# Patient Record
Sex: MALE | Race: WHITE | NOT HISPANIC OR LATINO | Employment: OTHER | URBAN - METROPOLITAN AREA
[De-identification: names, ages, dates, MRNs, and addresses within clinical notes are randomized per-mention and may not be internally consistent; named-entity substitution may affect disease eponyms.]

---

## 2017-01-19 ENCOUNTER — ALLSCRIPTS OFFICE VISIT (OUTPATIENT)
Dept: OTHER | Facility: OTHER | Age: 67
End: 2017-01-19

## 2017-02-07 ENCOUNTER — GENERIC CONVERSION - ENCOUNTER (OUTPATIENT)
Dept: OTHER | Facility: OTHER | Age: 67
End: 2017-02-07

## 2017-04-04 ENCOUNTER — ALLSCRIPTS OFFICE VISIT (OUTPATIENT)
Dept: OTHER | Facility: OTHER | Age: 67
End: 2017-04-04

## 2017-04-04 ENCOUNTER — GENERIC CONVERSION - ENCOUNTER (OUTPATIENT)
Dept: OTHER | Facility: OTHER | Age: 67
End: 2017-04-04

## 2017-04-20 ENCOUNTER — ALLSCRIPTS OFFICE VISIT (OUTPATIENT)
Dept: OTHER | Facility: OTHER | Age: 67
End: 2017-04-20

## 2017-04-20 DIAGNOSIS — R07.9 CHEST PAIN: ICD-10-CM

## 2017-04-20 DIAGNOSIS — R09.89 OTHER SPECIFIED SYMPTOMS AND SIGNS INVOLVING THE CIRCULATORY AND RESPIRATORY SYSTEMS: ICD-10-CM

## 2017-05-02 ENCOUNTER — HOSPITAL ENCOUNTER (OUTPATIENT)
Dept: NON INVASIVE DIAGNOSTICS | Facility: CLINIC | Age: 67
Discharge: HOME | End: 2017-05-02
Payer: MEDICARE

## 2017-05-02 DIAGNOSIS — R07.9 CHEST PAIN: ICD-10-CM

## 2017-05-02 DIAGNOSIS — R09.89 OTHER SPECIFIED SYMPTOMS AND SIGNS INVOLVING THE CIRCULATORY AND RESPIRATORY SYSTEMS: ICD-10-CM

## 2017-05-02 LAB
ARRHY DURING EX: NORMAL
CHEST PAIN STATEMENT: NORMAL
MAX DIASTOLIC BP: 80 MMHG
MAX HEART RATE: 96 BPM
MAX PREDICTED HEART RATE: 152 BPM
MAX. SYSTOLIC BP: 148 MMHG
PROTOCOL NAME: NORMAL
REASON FOR TERMINATION: NORMAL
TARGET HR FORMULA: NORMAL
TIME IN EXERCISE PHASE: 180 S

## 2017-05-02 PROCEDURE — 93017 CV STRESS TEST TRACING ONLY: CPT

## 2017-05-02 PROCEDURE — 78452 HT MUSCLE IMAGE SPECT MULT: CPT

## 2017-05-02 PROCEDURE — A9502 TC99M TETROFOSMIN: HCPCS

## 2017-05-04 ENCOUNTER — GENERIC CONVERSION - ENCOUNTER (OUTPATIENT)
Dept: OTHER | Facility: OTHER | Age: 67
End: 2017-05-04

## 2017-05-10 ENCOUNTER — HOSPITAL ENCOUNTER (OUTPATIENT)
Dept: NON INVASIVE DIAGNOSTICS | Facility: CLINIC | Age: 67
Discharge: HOME | End: 2017-05-10
Payer: MEDICARE

## 2017-05-10 DIAGNOSIS — R09.89 OTHER SPECIFIED SYMPTOMS AND SIGNS INVOLVING THE CIRCULATORY AND RESPIRATORY SYSTEMS: ICD-10-CM

## 2017-05-10 PROCEDURE — 93880 EXTRACRANIAL BILAT STUDY: CPT

## 2017-05-10 PROCEDURE — 93306 TTE W/DOPPLER COMPLETE: CPT

## 2017-05-11 ENCOUNTER — GENERIC CONVERSION - ENCOUNTER (OUTPATIENT)
Dept: OTHER | Facility: OTHER | Age: 67
End: 2017-05-11

## 2017-06-02 ENCOUNTER — HOSPITAL ENCOUNTER (EMERGENCY)
Facility: HOSPITAL | Age: 67
Discharge: HOME | End: 2017-06-02
Attending: EMERGENCY MEDICINE
Payer: MEDICARE

## 2017-06-02 VITALS
BODY MASS INDEX: 35.21 KG/M2 | SYSTOLIC BLOOD PRESSURE: 135 MMHG | OXYGEN SATURATION: 93 % | DIASTOLIC BLOOD PRESSURE: 63 MMHG | HEIGHT: 72 IN | RESPIRATION RATE: 18 BRPM | HEART RATE: 74 BPM | TEMPERATURE: 98.3 F | WEIGHT: 260 LBS

## 2017-06-02 DIAGNOSIS — T14.8XXA PUNCTURE WOUND: Primary | ICD-10-CM

## 2017-06-02 PROCEDURE — 99283 EMERGENCY DEPT VISIT LOW MDM: CPT

## 2017-06-02 RX ORDER — CEPHALEXIN 500 MG/1
500 CAPSULE ORAL ONCE
Status: COMPLETED | OUTPATIENT
Start: 2017-06-02 | End: 2017-06-02

## 2017-06-02 RX ORDER — CEPHALEXIN 250 MG/1
250 CAPSULE ORAL 4 TIMES DAILY
Qty: 20 CAPSULE | Refills: 0 | Status: SHIPPED | OUTPATIENT
Start: 2017-06-02 | End: 2017-06-07

## 2017-06-02 RX ADMIN — CEPHALEXIN 500 MG: 500 CAPSULE ORAL at 12:57

## 2017-10-04 ENCOUNTER — ALLSCRIPTS OFFICE VISIT (OUTPATIENT)
Dept: OTHER | Facility: OTHER | Age: 67
End: 2017-10-04

## 2017-10-05 NOTE — PROGRESS NOTES
Assessment  1  Hyperlipidemia, mixed (272 2) (E78 2)   2  Essential hypertension (401 9) (I10)   3  Seborrheic keratosis (702 19) (L82 1)    Plan  Encounter for prostate cancer screening, Hyperlipidemia, mixed    · (1) COMPREHENSIVE METABOLIC PANEL; Status:Active; Requested for:22Mar2018;    · (1) LIPID PANEL, FASTING; Status:Active; Requested for:22Mar2018;    · (1) PSA (SCREEN) (Dx V76 44 Screen for Prostate Cancer); Status:Active; Requested  for:22Mar2018;   Essential hypertension, Hyperlipidemia, mixed    · Follow-up visit in 6 months Evaluation and Treatment  Follow-up  Status: Hold For -  Scheduling  Requested for: [id number]    Discussion/Summary    No treatment for the skin lesion, let us know if it enlarges, changes color  The patient was counseled regarding diagnostic results,-instructions for management,-prognosis  Possible side effects of new medications were reviewed with the patient/guardian today  The treatment plan was reviewed with the patient/guardian  The patient/guardian understands and agrees with the treatment plan      Chief Complaint  Pt  in office today for a 6 month f/u and would like to discuss a spot on his chest  Pt  would also like a flu shot  Advance Directives  Advance Directive [facility]:   NO - Patient does not have an advance health care directive  History of Present Illness  The patient is being seen for an initial evaluation of an existing diagnosis of seborrheic keratosis  The patient presents with complaints of gradual onset of constant episodes of left trunk skin lesion, described as crusted (present for about 20 years, did have it removed once, but has returned)  The patient is currently experiencing symptoms  The patient states his hyperlipidemia has been under good control since the last visit  Comorbid Illnesses: hypertension  He has no significant interval events  Symptoms: The patient is currently asymptomatic     Medications: the patient is adherent with his medication regimen -He denies medication side effects  The patient presents for follow-up of essential hypertension  The patient states he has been doing well with his blood pressure control since the last visit  He has no significant interval events  Symptoms: The patient is currently asymptomatic  Medications: the patient is adherent with his medication regimen -He denies medication side effects  Review of Systems    Constitutional: No fever or chills, feels well, no tiredness, no recent weight gain or weight loss  Eyes: No complaints of eye pain, no red eyes, no discharge from eyes, no itchy eyes  ENT: no complaints of earache, no hearing loss, no nosebleeds, no nasal discharge, no sore throat, no hoarseness  Cardiovascular: No complaints of slow heart rate, no fast heart rate, no chest pain, no palpitations, no leg claudication, no lower extremity  Respiratory: No complaints of shortness of breath, no wheezing, no cough, no SOB on exertion, no orthopnea or PND  Gastrointestinal: No complaints of abdominal pain, no constipation, no nausea or vomiting, no diarrhea or bloody stools  Genitourinary: No complaints of dysuria, no incontinence, no hesitancy, no nocturia, no genital lesion, no testicular pain  Musculoskeletal: No complaints of arthralgia, no myalgias, no joint swelling or stiffness, no limb pain or swelling  Integumentary: as noted in HPI  Neurological: No compliants of headache, no confusion, no convulsions, no numbness or tingling, no dizziness or fainting, no limb weakness, no difficulty walking  Psychiatric: Is not suicidal, no sleep disturbances, no anxiety or depression, no change in personality, no emotional problems  Endocrine: No complaints of proptosis, no hot flashes, no muscle weakness, no erectile dysfunction, no deepening of the voice, no feelings of weakness     Hematologic/Lymphatic: No complaints of swollen glands, no swollen glands in the neck, does not bleed easily, no easy bruising  Active Problems  1  Acute bronchitis (466 0) (J20 9)   2  Bruit of right carotid artery (785 9) (R09 89)   3  Chest pain, exertional (786 50) (R07 9)   4  Diarrhea, unspecified type (787 91) (R19 7)   5  Encounter for prostate cancer screening (V76 44) (Z12 5)   6  Encounter for screening colonoscopy (V76 51) (Z12 11)   7  Exertional dyspnea (786 09) (R06 09)   8  Hyperlipidemia, mixed (272 2) (E78 2)   9  Need for diphtheria-tetanus-pertussis (Tdap) vaccine (V06 1) (Z23)   10  Need for influenza vaccination (V04 81) (Z23)   11  Other insomnia (780 52) (G47 09)   12  Screening for genitourinary condition (V81 6) (Z13 89)   13  Sinusitis, bacterial (473 9,041 9) (J32 9,B96 89)   14  Skin lesion of back (709 9) (L98 9)    Past Medical History  1  History of Essential hypertension with goal blood pressure less than 130/85 (401 9)   (I10)   2  History of hyperlipidemia (V12 29) (Z86 39)   3  History of Screening for genitourinary condition (V81 6) (Z13 89)    The active problems and past medical history were reviewed and updated today  Surgical History  1  History of Cataract Surgery   2  History of Cholecystectomy   3  History of Knee Replacement   4  History of Total Hip Replacement    The surgical history was reviewed and updated today  Family History  Mother    1  Family history of Diabetes (250 00) (E11 9)   2  Denied: Family history of mental disorder   3  Denied: Family history of substance abuse  Father    4  Family history of CAD (coronary artery disease)   5  Denied: Family history of mental disorder   6  Denied: Family history of substance abuse   7  Family history of Ruptured abdominal aortic aneurysm (AAA)  Paternal Grandmother    6  Family history of CAD (coronary artery disease)  Paternal Grandfather    5  Family history of CAD (coronary artery disease)    The family history was reviewed and updated today         Social History   · Daily caffeine consumption   · Does not drink alcohol (V49 89) (Z78 9)   · Does not use illicit drugs (T64 26) (Z78 9)   · Former smoker (V15 82) (Z87 891)   ·    · Retired  The social history was reviewed and updated today  Current Meds   1  Quinapril HCl - 10 MG Oral Tablet; take 1 tablet every day; Therapy: 72LZX9490 to (XUGCUCPU:08JQP4892)  Requested for: 28Jun2017; Last   Rx:28Jun2017 Ordered   2  Simvastatin 40 MG Oral Tablet; Take 1 tablet daily; Therapy: (Recorded:20Apr2017) to Recorded   3  TraZODone HCl - 50 MG Oral Tablet; TAKE 1 TABLET AT BEDTIME Recorded    The medication list was reviewed and updated today  Allergies  1  No Known Drug Allergies    Vitals  Vital Signs    Recorded: 68IHJ7585 08:56AM   Heart Rate 61   Systolic 053   Diastolic 80   Height 5 ft 11 in   Weight 263 lb    BMI Calculated 36 68   BSA Calculated 2 37   O2 Saturation 97     Physical Exam    Constitutional   General appearance: No acute distress, well appearing and well nourished  Ears, Nose, Mouth, and Throat   Oropharynx: Normal with no erythema, edema, exudate or lesions  Pulmonary   Auscultation of lungs: Clear to auscultation, equal breath sounds bilaterally, no wheezes, no rales, no rhonci  Cardiovascular   Auscultation of heart: Normal rate and rhythm, normal S1 and S2, without murmurs  Examination of extremities for edema and/or varicosities: Normal     Abdomen   Abdomen: Non-tender, no masses  Musculoskeletal   Gait and station: Normal     Neurologic   Cranial nerves: Cranial nerves 2-12 intact  Psychiatric   Orientation to person, place and time: Normal     Mood and affect: Normal          Health Management  Encounter for screening colonoscopy   COLONOSCOPY; every 5 years; Last [id number]; Next Due: 11NBE2464;  Active    Signatures   Electronically signed by : Cassius Syed DO; Oct  4 2017  9:15AM EST                       (Author)

## 2017-12-01 ENCOUNTER — ALLSCRIPTS OFFICE VISIT (OUTPATIENT)
Dept: OTHER | Facility: OTHER | Age: 67
End: 2017-12-01

## 2017-12-05 NOTE — PROGRESS NOTES
Assessment    1  Abdominal pain, generalized (789 07) (R10 84)   2  Diarrhea, unspecified type (787 91) (R19 7)    Plan  Abdominal pain, generalized, Diarrhea, unspecified type    · Levsin 0 125 MG Oral Tablet; TAKE 1 TABLET 3 times daily PRN abdominal pain    Discussion/Summary    He is to call on Tuesday to report symptoms  The patient was counseled regarding diagnostic results,-- instructions for management,-- prognosis  Chief Complaint  Pt in office today c/o abdominal bloating and diarrhea  History of Present Illness  Abdominal Pain (Follow-Up): The patient is being seen for follow-up of abdominal pain  The patient reports worsening cramping and diarrhea  He has no comorbid illnesses  Medications include antidiarrheals  Medications:  the patient is adherent to his medication regimen, but-- he denies medication side effects  Review of Systems   Constitutional: no fever or chills, feels well, no tiredness, no recent weight loss or weight gain-- and-- no fever  ENT: no complaints of earache, no loss of hearing, no nosebleeds or nasal discharge, no sore throat or hoarseness  Cardiovascular: no complaints of slow or fast heart rate, no chest pain, no palpitations, no leg claudication or lower extremity edema  Respiratory: no complaints of shortness of breath, no wheezing or cough, no dyspnea on exertion, no orthopnea or PND  Gastrointestinal: as noted in HPI  Genitourinary: no complaints of dysuria or incontinence, no hesitancy, no nocturia, no genital lesion, no inadequacy of penile erection  Musculoskeletal: no complaints of arthralgia, no myalgia, no joint swelling or stiffness, no limb pain or swelling  Active Problems  1  Acute bronchitis (466 0) (J20 9)   2  Bruit of right carotid artery (785 9) (R09 89)   3  Chest pain, exertional (786 50) (R07 9)   4  Encounter for prostate cancer screening (V76 44) (Z12 5)   5  Encounter for screening colonoscopy (V76 51) (Z12 11)   6  Essential hypertension (401 9) (I10)   7  Exertional dyspnea (786 09) (R06 09)   8  Hyperlipidemia, mixed (272 2) (E78 2)   9  Need for diphtheria-tetanus-pertussis (Tdap) vaccine (V06 1) (Z23)   10  Need for influenza vaccination (V04 81) (Z23)   11  Other insomnia (780 52) (G47 09)   12  Screening for genitourinary condition (V81 6) (Z13 89)   13  Seborrheic keratosis (702 19) (L82 1)   14  Sinusitis, bacterial (473 9,041 9) (J32 9,B96 89)   15  Skin lesion of back (709 9) (L98 9)    Past Medical History  1  History of Essential hypertension with goal blood pressure less than 130/85 (401 9) (I10)   2  History of diarrhea (V12 79) (Z87 898)   3  History of diarrhea (V12 79) (Z87 898)   4  History of hyperlipidemia (V12 29) (Z86 39)   5  History of Screening for genitourinary condition (V81 6) (Z13 89)    Family History  Mother    1  Family history of Diabetes (250 00) (E11 9)   2  Denied: Family history of mental disorder   3  Denied: Family history of substance abuse  Father    4  Family history of CAD (coronary artery disease)   5  Denied: Family history of mental disorder   6  Denied: Family history of substance abuse   7  Family history of Ruptured abdominal aortic aneurysm (AAA)  Paternal Grandmother    6  Family history of CAD (coronary artery disease)  Paternal Grandfather    5  Family history of CAD (coronary artery disease)    Social History   · Daily caffeine consumption   · Does not drink alcohol (V49 89) (Z78 9)   · Does not use illicit drugs (H02 18) (Z78 9)   · Former smoker (C61 69) (Q80 047)   ·    · Retired    Surgical History    1  History of Cataract Surgery   2  History of Cholecystectomy   3  History of Knee Replacement   4  History of Total Hip Replacement    Current Meds   1  Quinapril HCl - 10 MG Oral Tablet; take 1 tablet every day; Therapy: 32MZZ5533 to (MAGOMLLI:56DBM0279)  Requested for: 28Jun2017; Last Rx:28Jun2017 Ordered   2  Simvastatin 40 MG Oral Tablet;  Take 1 tablet daily; Therapy: (Recorded:42Svm2715) to Recorded   3  TraZODone HCl - 50 MG Oral Tablet; TAKE 1 TABLET AT BEDTIME Recorded    Allergies  1  No Known Drug Allergies    Vitals   Recorded: 10TNM8223 11:25AM   Temperature 97 8 F   Heart Rate 73   Systolic 264   Diastolic 80   Height 5 ft 11 in   Weight 262 lb    BMI Calculated 36 54   BSA Calculated 2 37   O2 Saturation 97       Physical Exam   Constitutional  General appearance: No acute distress, well appearing and well nourished  Ears, Nose, Mouth, and Throat  Oropharynx: Normal with no erythema, edema, exudate or lesions  Pulmonary  Auscultation of lungs: Clear to auscultation, equal breath sounds bilaterally, no wheezes, no rales, no rhonci  Cardiovascular  Auscultation of heart: Normal rate and rhythm, normal S1 and S2, without murmurs  Examination of extremities for edema and/or varicosities: Normal    Abdomen  Abdomen: Non-tender, no masses  Musculoskeletal  Gait and station: Normal          Future Appointments    Date/Time Provider Specialty Site   04/04/2018 08:30 AM GRECIA Pedraza   [address]       Signatures   Electronically signed by : Myles Rhodes DO; Dec  1 2017 12:37PM EST                       (Author)

## 2017-12-26 ENCOUNTER — ALLSCRIPTS OFFICE VISIT (OUTPATIENT)
Dept: OTHER | Facility: OTHER | Age: 67
End: 2017-12-26

## 2018-01-09 ENCOUNTER — HOSPITAL ENCOUNTER (OUTPATIENT)
Facility: HOSPITAL | Age: 68
Setting detail: OUTPATIENT SURGERY
Discharge: HOME | End: 2018-01-09
Attending: INTERNAL MEDICINE | Admitting: INTERNAL MEDICINE
Payer: MEDICARE

## 2018-01-09 ENCOUNTER — ANESTHESIA EVENT (OUTPATIENT)
Dept: PERIOP | Facility: HOSPITAL | Age: 68
End: 2018-01-09
Payer: MEDICARE

## 2018-01-09 ENCOUNTER — ANESTHESIA (OUTPATIENT)
Dept: PERIOP | Facility: HOSPITAL | Age: 68
End: 2018-01-09
Payer: MEDICARE

## 2018-01-09 ENCOUNTER — GENERIC CONVERSION - ENCOUNTER (OUTPATIENT)
Dept: GASTROENTEROLOGY | Facility: CLINIC | Age: 68
End: 2018-01-09

## 2018-01-09 VITALS
WEIGHT: 261.8 LBS | RESPIRATION RATE: 18 BRPM | HEIGHT: 72 IN | BODY MASS INDEX: 35.46 KG/M2 | HEART RATE: 59 BPM | TEMPERATURE: 98.8 F | SYSTOLIC BLOOD PRESSURE: 107 MMHG | OXYGEN SATURATION: 95 % | DIASTOLIC BLOOD PRESSURE: 65 MMHG

## 2018-01-09 DIAGNOSIS — R10.32 ABDOMINAL PAIN, BILATERAL LOWER QUADRANT: ICD-10-CM

## 2018-01-09 DIAGNOSIS — R14.1 ABDOMINAL GAS PAIN: ICD-10-CM

## 2018-01-09 DIAGNOSIS — R10.84 GENERALIZED ABDOMINAL PAIN: ICD-10-CM

## 2018-01-09 DIAGNOSIS — R10.31 ABDOMINAL PAIN, BILATERAL LOWER QUADRANT: ICD-10-CM

## 2018-01-09 PROCEDURE — 88305 TISSUE EXAM BY PATHOLOGIST: CPT | Performed by: INTERNAL MEDICINE

## 2018-01-09 RX ORDER — SODIUM CHLORIDE, SODIUM LACTATE, POTASSIUM CHLORIDE, CALCIUM CHLORIDE 600; 310; 30; 20 MG/100ML; MG/100ML; MG/100ML; MG/100ML
125 INJECTION, SOLUTION INTRAVENOUS CONTINUOUS
Status: DISCONTINUED | OUTPATIENT
Start: 2018-01-09 | End: 2018-01-09 | Stop reason: HOSPADM

## 2018-01-09 RX ORDER — PROPOFOL 10 MG/ML
INJECTION, EMULSION INTRAVENOUS AS NEEDED
Status: DISCONTINUED | OUTPATIENT
Start: 2018-01-09 | End: 2018-01-09 | Stop reason: SURG

## 2018-01-09 RX ADMIN — PROPOFOL 20 MG: 10 INJECTION, EMULSION INTRAVENOUS at 11:25

## 2018-01-09 RX ADMIN — PROPOFOL 100 MG: 10 INJECTION, EMULSION INTRAVENOUS at 11:19

## 2018-01-09 RX ADMIN — PROPOFOL 100 MG: 10 INJECTION, EMULSION INTRAVENOUS at 11:21

## 2018-01-09 RX ADMIN — SODIUM CHLORIDE, SODIUM LACTATE, POTASSIUM CHLORIDE, AND CALCIUM CHLORIDE 125 ML/HR: .6; .31; .03; .02 INJECTION, SOLUTION INTRAVENOUS at 11:04

## 2018-01-09 NOTE — DISCHARGE INSTRUCTIONS
Colonoscopy   WHAT YOU NEED TO KNOW:   A colonoscopy is a procedure to examine the inside of your colon (intestine) with a scope  Polyps or tissue growths may have been removed during your colonoscopy  It is normal to feel bloated and to have some abdominal discomfort  You should be passing gas  If you have hemorrhoids or you had polyps removed, you may have a small amount of bleeding  DISCHARGE INSTRUCTIONS:   Seek care immediately if:   · You have a large amount of bright red blood in your bowel movements  · Your abdomen is hard and firm and you have severe pain  · You have sudden trouble breathing  Contact your healthcare provider if:   · You develop a rash or hives  · You have a fever within 24 hours of your procedure  · You have not had a bowel movement for 3 days after your procedure  · You have questions or concerns about your condition or care  Activity:   · Do not lift, strain, or run  for 3 days after your procedure  · Rest after your procedure  You have been given medicine to relax you  Do not  drive or make important decisions until the day after your procedure  Return to your normal activity as directed  · Relieve gas and discomfort from bloating  by lying on your right side with a heating pad on your abdomen  You may need to take short walks to help the gas move out  Eat small meals until bloating is relieved  If you had polyps removed: For 7 days after your procedure:  · Do not  take aspirin  · Do not  go on long car rides  Help prevent constipation:   · Eat a variety of healthy foods  Healthy foods include fruit, vegetables, whole-grain breads, low-fat dairy products, beans, lean meat, and fish  Ask if you need to be on a special diet  Your healthcare provider may recommend that you eat high-fiber foods such as cooked beans  Fiber helps you have regular bowel movements  · Drink liquids as directed    Adults should drink between 9 and 13 eight-ounce cups of liquid every day  Ask what amount is best for you  For most people, good liquids to drink are water, juice, and milk  · Exercise as directed  Talk to your healthcare provider about the best exercise plan for you  Exercise can help prevent constipation, decrease your blood pressure and improve your health  Follow up with your healthcare provider as directed:  Write down your questions so you remember to ask them during your visits  © 2017 [address] [employer] Information is for End User's use only and may not be sold, redistributed or otherwise used for commercial purposes  All illustrations and images included in CareNotes® are the copyrighted property of [employer]  or [employer]  The above information is an  only  It is not intended as medical advice for individual conditions or treatments  Talk to your doctor, nurse or pharmacist before following any medical regimen to see if it is safe and effective for you

## 2018-01-09 NOTE — OP NOTE
**** GI/ENDOSCOPY REPORT ****     PATIENT NAME: Sathya Jeter ------ VISIT ID:  Patient ID:   [id number] YOB: 1950     INTRODUCTION: Colonoscopy - A 75 male patient presents for an outpatient   Colonoscopy at [address] [place] [address]  PREVIOUS COLONOSCOPY: Patient's last colonoscopy was 4 years ago  INDICATIONS: Diarrhea  Pain centered in the right lower quadrant of the   abdomen  LLQ pain     CONSENT:  The benefits, risks, and alternatives to the procedure were   discussed and informed consent was obtained from the patient  PREPARATION: EKG, pulse, pulse oximetry and blood pressure were monitored   throughout the procedure  The patient was identified by myself both   verbally and by visual inspection of ID band  Airway Assessment   Classification: Airway class 2 - Visualization of the soft palate, fauces   and uvula  ASA Classification: Class 2 - Patient has mild to moderate   systemic disturbance that may or may not be related to the disorder   requiring surgery  MEDICATIONS: Anesthesia-check records     PROCEDURE:  The endoscope was passed with ease through the anus under   direct visualization and advanced to the terminal ileum, confirmed by   appendiceal orifice, cecal strap (crow's foot), and ileocecal valve  The   scope was withdrawn and the mucosa was carefully examined  The quality of   the preparation was excellent  Cecal Intubation Time: 2 minutes(s) Scope   Withdrawal Time: 7 minutes(s)     RECTAL EXAM: Normal rectal exam      FINDINGS:  There was evidence of moderately severe diverticulosis in the   sigmoid colon and descending colon  Otherwise, the colon appeared to be   normal  The terminal ileum appeared to be normal  A biopsy was taken from   the terminal ileum, ascending colon, and descending colon  Sent for IBD  Normal retroversion     COMPLICATIONS: There were no complications       IMPRESSIONS: Moderately severe diverticulosis found in the sigmoid colon and descending colon  Normal terminal ileum  Biopsy taken  RECOMMENDATIONS: Follow-up on the results of the biopsy specimens  Colonoscopy recommended in 5 years  ESTIMATED BLOOD LOSS: Insignificant  PATHOLOGY SPECIMENS: Random biopsy taken from the terminal ileum,   ascending colon, and descending colon  PROCEDURE CODES: Colonoscopy with biopsy     ICD-9 Codes: 787 91 Diarrhea 789 03 Abdominal pain, right lower quadrant   562 10 Diverticulosis of colon (without mention of hemorrhage)     ICD-10 Codes: R19 7 Diarrhea, unspecified R10 31 Right lower quadrant pain   K57 Diverticular disease of intestine     PERFORMED BY: GRECIA Canchola  [facility]  on 01/09/2018  Version 1, electronically signed by GRECIA Verde , D O  on   01/09/2018 at 11:33

## 2018-01-09 NOTE — ANESTHESIA PREPROCEDURE EVALUATION
Review of Systems/Medical History  Patient summary reviewed  Chart reviewed      Cardiovascular  Exercise tolerance: good,  Hypertension controlled,    Pulmonary  Smoker ex-smoker , ,        GI/Hepatic    Bowel prep       Negative  ROS        Endo/Other  Negative endo/other ROS      GYN       Hematology  Negative hematology ROS      Musculoskeletal  Obesity  morbid obesity,        Neurology  Negative neurology ROS      Psychology   Negative psychology ROS            Physical Exam    Airway    Mallampati score: I  TM Distance: >3 FB  Neck ROM: full     Dental   Comment: No loose,     Cardiovascular  Rhythm: regular, Rate: normal,     Pulmonary  Breath sounds clear to auscultation,     Other Findings        Anesthesia Plan  ASA Score- 2     Anesthesia Type- IV sedation with anesthesia with ASA Monitors  Additional Monitors:   Airway Plan:         Plan Factors-  Patient did not smoke on day of surgery  Induction- intravenous  Postoperative Plan-     Informed Consent- Anesthetic plan and risks discussed with patient  I personally reviewed this patient with the CRNA  Discussed and agreed on the Anesthesia Plan with the CRNA  Alessandra Moran

## 2018-01-10 NOTE — RESULT NOTES
Verified Results  (Q) CULTURE, STOOL, SAL/SHIG/CAMPY AND SHIGA TOXINS EIA W/RFL E COLI O157 CULT [id number] 10:30AM Marisol Saucier   REPORT COMMENT:  FASTING:UNKNOWN     Test Name Result Flag Reference   SHIGA TOXINS, EIA W/RFL$TO E COLI O157 CULTURE      SHIGA TOXINS, EIA W/RFL TO E COLI O157 CULTURE         MICRO NUMBER:      [id number]    TEST STATUS:       FINAL    SPECIMEN SOURCE:   STOOL    SPECIMEN QUALITY:  ADEQUATE    RESULT:            Not Detected   CULTURE, STOOL, MITZI/SHIG/CAMPY AND SHIGA TOXINS EIA W/RFL E COLI O157 CULT      CAMPYLOBACTER, CULTURE         MICRO NUMBER:      [id number]    TEST STATUS:       FINAL    SPECIMEN SOURCE:   STOOL    SPECIMEN QUALITY:  ADEQUATE    RESULT:            No enteric Campylobacter isolated   CULTURE, STOOL, MITZI/SHIG/CAMPY AND SHIGA TOXINS EIA W/RFL E COLI O157 CULT      SALMONELLA AND SHIGELLA, CULTURE         MICRO NUMBER:      [id number]    TEST STATUS:       FINAL    SPECIMEN SOURCE:   STOOL    SPECIMEN QUALITY:  ADEQUATE    RESULT:            No Salmonella or Shigella isolated

## 2018-01-13 VITALS
HEART RATE: 72 BPM | SYSTOLIC BLOOD PRESSURE: 126 MMHG | HEIGHT: 71 IN | OXYGEN SATURATION: 95 % | DIASTOLIC BLOOD PRESSURE: 72 MMHG | WEIGHT: 259 LBS | TEMPERATURE: 97.1 F | BODY MASS INDEX: 36.26 KG/M2

## 2018-01-13 VITALS
DIASTOLIC BLOOD PRESSURE: 88 MMHG | WEIGHT: 252 LBS | HEART RATE: 72 BPM | BODY MASS INDEX: 35.28 KG/M2 | SYSTOLIC BLOOD PRESSURE: 132 MMHG | OXYGEN SATURATION: 95 % | HEIGHT: 71 IN

## 2018-01-13 VITALS
SYSTOLIC BLOOD PRESSURE: 120 MMHG | HEIGHT: 71 IN | DIASTOLIC BLOOD PRESSURE: 80 MMHG | WEIGHT: 261.38 LBS | HEART RATE: 68 BPM | BODY MASS INDEX: 36.59 KG/M2

## 2018-01-14 VITALS
HEART RATE: 61 BPM | DIASTOLIC BLOOD PRESSURE: 80 MMHG | WEIGHT: 263 LBS | SYSTOLIC BLOOD PRESSURE: 118 MMHG | BODY MASS INDEX: 36.82 KG/M2 | HEIGHT: 71 IN | OXYGEN SATURATION: 97 %

## 2018-01-14 NOTE — RESULT NOTES
Verified Results  VAS CAROTID COMPLETE STUDY [id number] 02:50PM aDniel Hilliard Order Number: [id number]    - Patient Instructions: To schedule this appointment, please contact Central Scheduling at [phone]  Test Name Result Flag Reference   VAS CAROTID COMPLETE STUDY (Report)     [facility] REPORT   CLINICAL:   Indications: Bruit [R09 89]  Patient presents for a general health evaluation secondary to other   cardiovascular symptoms  Patient is asymptomatic from a cerebral vascular   standpoint  Risk Factors   The patient has history of HTN and hyperlipidemia  Clinical   Right Brachial Pressure: 138/68 mmHg, Left Brachial Pressure: 136/74 mmHg  FINDINGS:      Right    Impression PSV EDV (cm/s) Direction of Flow Ratio    Dist  ICA         82     36           0 79    Mid  ICA         69     26           0 67    Prox  ICA  Normal    60     24           0 58    Dist CCA         66     22                 Mid CCA         104     25           0 96    Prox CCA         108     27                 Ext Carotid        95     14           0 92    Prox Vert         56     19 Antegrade            Subclavian        137      9                    Left     Impression PSV EDV (cm/s) Direction of Flow Ratio    Dist  ICA         55     25           0 42    Mid  ICA         80     24           0 62    Prox  ICA  1 - 49%   84     22           0 65    Dist CCA         82     26                 Mid CCA         129     37           1 09    Prox CCA         119     28                 Ext Carotid        98     15           0 76    Prox Vert         45     11 Antegrade            Subclavian        126      0                          CONCLUSION:   Impression   RIGHT:   There is no evidence of significant stenosis noted  Vertebral artery flow is antegrade  There is no significant subclavian artery   disease  LEFT:   There is <50% stenosis noted in the internal carotid artery   Plaque is   heterogenous and irregular  Vertebral artery flow is antegrade  There is no significant subclavian artery   disease  Internal carotid artery stenosis determination by consensus criteria from:   Musa Jackman et al  Carotid Artery Stenosis: Gray-Scale and Doppler US Diagnosis   - Society of Radiologists in [address] [place] [address], Radiology 2003;   [phone]        SIGNATURE:   Electronically Signed by: Vane Martinez on 2017-05-10 10:05:03 PM

## 2018-01-15 NOTE — RESULT NOTES
Verified Results  NM MYOCARDIAL PERFUSION SPECT (RX STRESS AND/OR REST) [id number] 08:06AM Gary Michel Order Number: [id number]    - Patient Instructions: To schedule this appointment, please contact Central Scheduling at [phone]  Test Name Result Flag Reference   NM MYOCARDIAL PERFUSION SPECT (RX STRESS AND/OR REST) (Report)     [address], [address] [place] [address]   [phone]     Rest/Stress Gated SPECT Myocardial Perfusion Imaging After Regadenoson     Patient: Jim Juan   MR number: [id number]   Account number: [de-identified]   : 1950   Age: 75 years   Gender: Male   Status: Outpatient   Location: [facility]   Height: 71 in   Weight: 261 lb   BP: 148/ 80 mmHg     Allergies: MORPHINE AND RELATED     Diagnosis: R07 9 - Chest pain, unspecified     Primary Physician: Siri Grider DO   Interpreting Physician: Magdaleno Alvarado MD   Referring Physician: Magdaleno Alvarado MD   Technician: Bryn Martinez BS, BA, AART(N)   Group: [facility]   Other: Bao Pressley MS, CCT     INDICATIONS: Chest Pain     HISTORY: The patient is a 75year old  male  Chest pain status: recent onset chest pain  Other symptoms: dyspnea of recent onset  Coronary artery disease risk factors: dyslipidemia and family history of premature coronary artery   disease  Cardiovascular history: coronary artery disease  Co-morbidity: obesity  Medications: an ACE inhibitor/ARB and a lipid lowering agent  REST ECG: Normal sinus rhythm  PROCEDURE: The study was performed at [address] [place] [address]  A regadenoson infusion pharmacologic stress test was performed  Gated SPECT myocardial perfusion imaging was performed after stress and at rest  Systolic blood pressure   was 148 mmHg, at the start of the study  Diastolic blood pressure was 80 mmHg, at the start of the study  The heart rate was 61 bpm, at the start of the study  Regadenoson protocol:   HR bpm SBP mmHg DBP mmHg Rhythm/conduct   Baseline 61 148 80 --   Immediate 96 140 72 SVT   1 min 75 -- -- --   2 min 75 128 70 --   No medications or fluids given  STRESS SUMMARY: Duration of pharmacologic stress was 3 min  Maximal heart rate during stress was 96 bpm  The rate-pressure product for the peak heart rate and blood pressure was 11942  There was no chest pain during stress  The stress test   was terminated due to protocol completion  The stress ECG was negative for ischemia  There were no stress arrhythmias or conduction abnormalities  ISOTOPE ADMINISTRATION:   Resting isotope administration Stress isotope administration   Agent Tetrofosmin Tetrofosmin   Dose 15 2 mCi 45 2 mCi   Date 05/02/2017 05/02/2017     MYOCARDIAL PERFUSION IMAGING:   The image quality was good  PERFUSION DEFECTS:   - There was a moderate-sized, moderately severe, fixed myocardial perfusion defect of the entire inferior wall likely due to diaphragm attenuation that improved on prone imaging  GATED SPECT:   The calculated left ventricular ejection fraction was 52 %  Left ventricular ejection fraction was within normal limits by visual estimate  There was no left ventricular regional abnormality  SUMMARY:   - Stress results: There was no chest pain during stress  - ECG conclusions: The stress ECG was negative for ischemia  - Perfusion imaging: There was a moderate-sized, moderately severe, fixed myocardial perfusion defect of the entire inferior wall likely due to diaphragm attenuation that improved on prone imaging    - Gated SPECT: The calculated left ventricular ejection fraction was 52 %  Left ventricular ejection fraction was within normal limits by visual estimate  There was no left ventricular regional abnormality  IMPRESSIONS: Normal study  There was image artifact, without diagnostic evidence for perfusion abnormality       Prepared and signed by     Terra Lew MD Signed 05/02/2017 12:23:30

## 2018-01-16 NOTE — RESULT NOTES
Verified Results  ECHO COMPLETE WITH CONTRAST IF INDICATED [id number] 02:50PM Amarjit Vincent Order Number: [id number]    - Patient Instructions: To schedule this appointment, please contact Central Scheduling at [phone]  Test Name Result Flag Reference   ECHO COMPLETE WITH CONTRAST IF INDICATED (Report)     [address] [facility]   [phone]     Transthoracic Echocardiogram   2D, M-mode, and Color Doppler     Study date: 10-May-2017     Patient: Dolores Ragland   MR number: [id number]   Account number: [de-identified]   : 1950   Age: 75 years   Gender: Male   Status: Outpatient   Location: [facility]   Height: 71 in   Weight: 261 lb   BP: 120/ 80 mmHg     Indications: Chest Pain  Diagnoses: R07 9 - Chest pain, unspecified     Sonographer: Roberts RCS   Interpreting Physician: Jessica Hubbard MD   Primary Physician: Kulwant Vee DO   Referring Physician: Jessica Hubbard MD   Group: [facility]     SUMMARY     LEFT VENTRICLE:   Systolic function was normal  Ejection fraction was estimated in the range of 55 % to 65 %  There were no regional wall motion abnormalities  Doppler parameters were consistent with abnormal left ventricular relaxation (grade 1 diastolic dysfunction)  MITRAL VALVE:   There was trace regurgitation  AORTIC VALVE:   There was mild regurgitation  TRICUSPID VALVE:   There was mild regurgitation  HISTORY: PRIOR HISTORY: Medication-treated hyperlipidemia  Risk factors: a family history of coronary artery disease  PROCEDURE: The study was performed in the [address] [place] [address]  This was a routine study  The transthoracic approach was used  The study included complete 2D imaging, M-mode, and color Doppler  The heart rate was 67 bpm, at the   start of the study   Images were obtained from the parasternal, apical, subcostal, and suprasternal notch acoustic windows  Image quality was adequate  LEFT VENTRICLE: Size was normal  Systolic function was normal  Ejection fraction was estimated in the range of 55 % to 65 %  There were no regional wall motion abnormalities  Wall thickness was normal  DOPPLER: Doppler parameters were   consistent with abnormal left ventricular relaxation (grade 1 diastolic dysfunction)  RIGHT VENTRICLE: The size was normal  Systolic function was normal  Wall thickness was normal      LEFT ATRIUM: Size was normal      RIGHT ATRIUM: Size was normal      MITRAL VALVE: Valve structure was normal  There was normal leaflet separation  DOPPLER: The transmitral velocity was within the normal range  There was no evidence for stenosis  There was trace regurgitation  AORTIC VALVE: The valve was trileaflet  Leaflets exhibited normal thickness and normal cuspal separation  DOPPLER: Transaortic velocity was within the normal range  There was no evidence for stenosis  There was mild regurgitation  TRICUSPID VALVE: The valve structure was normal  There was normal leaflet separation  DOPPLER: The transtricuspid velocity was within the normal range  There was no evidence for stenosis  There was mild regurgitation  PULMONIC VALVE: Leaflets exhibited normal thickness, no calcification, and normal cuspal separation  DOPPLER: The transpulmonic velocity was within the normal range  There was no regurgitation  PERICARDIUM: There was no pericardial effusion  The pericardium was normal in appearance  AORTA: The root exhibited normal size  SYSTEMIC VEINS: IVC: The inferior vena cava was normal in size and course  Respirophasic changes were normal      SYSTEM MEASUREMENT TABLES     Apical four chamber   4 chamber Left Atrium Volume Index; Planimetry; End Systole; Apical four chamber;: 15 35 cm2   Left Ventricular Diastolic Area; Method of Disks, Single Plane; End Diastole;  Apical four chamber;: 42 78 cm2   Left Ventricular Ejection Fraction; Method of Disks, Single Plane; Apical four chamber;: 59 4 %   Left Ventricular systolic Area; Method of Disks, Single Plane; End Systole; Apical four chamber;: 24 57 cm2   Right Atrium Systolic Area; Planimetry; End Systole; Apical four chamber;: 11 78 cm2   Right Ventricular Internal Diastolic Dimension; End Diastole; Apical four chamber;: 28 7 mm   TAPSE: 22 1 mm     Unspecified Scan Mode   Aortic Root Diameter; End Systole;: 36 4 mm   Gradient Pressure, Peak; Simplified Bernoulli; Antegrade Flow; Systole;: 9 8 mm[Hg]   Gradient pressure, average; Simplified Bernoulli; Antegrade Flow; Systole;: 5 5 mm[Hg]   Left Atrium to Aortic Root Ratio;: 1 01   Left atrial diameter; End Diastole;: 36 9 mm   Cardiac Output; Teichholz; Systole;: 3 24 L/min   Heart rate; Teichholz;: 72 {H  B }/min   Interventricular Septum Diastolic Thickness; Teichholz; End Diastole;: 11 9 mm   Left Ventricle Internal End Diastolic Dimension; Teichholz;: 46 6 mm   Left Ventricle Internal Systolic Dimension; Teichholz; End Systole;: 32 8 mm   Left Ventricle Mass; Mass AVCube with Teichholz; End Diastole;: 204 g   Left Ventricle Posterior Wall Diastolic Thickness; Teichholz; End Diastole;: 12 2 mm   Left Ventricular Ejection Fraction; Teichholz;: 53 4 %   Left Ventricular Fractional Shortening;: 27 4 %   Stroke volume;  Teichholz; Systole;: 49 9 ml   Mitral Valve Area; Area by Pressure Half-Time; Systole;: 3 1 cm2   Mitral Valve E to A Ratio; Systole;: 0 56   Pressure half time; Diastole;: 0 07 s   Maximum Tricuspid valve regurgitation pressure gradient; Regurgitant Flow; Systole;: 32 1 mm[Hg]     [facility]     Prepared and electronically signed by     Lauren Carmona MD   Signed [id number] 10:23:27

## 2018-01-18 NOTE — RESULT NOTES
Verified Results  CLOSTRIDIUM DIFFICILE TOXIN/GDH W/REFL TO PCR [id number] 10:53AM Shaunnar Carla   REPORT COMMENT:  FASTING:UNKNOWN     Test Name Result Flag Reference   CLOSTRIDIUM DIFFICILE TOXIN/GDH W/REFL TO PCR      CLOSTRIDIUM DIFFICILE TOXIN/GDH W/REFL TO PCR         MICRO NUMBER:      [id number]    TEST STATUS:       FINAL    SPECIMEN SOURCE:   STOOL    SPECIMEN QUALITY:  ADEQUATE    GDH ANTIGEN:       Not Detected    TOXIN A AND B:     Not Detected    COMMENT:           No toxigenic C  difficile detected

## 2018-01-23 VITALS
BODY MASS INDEX: 36.68 KG/M2 | OXYGEN SATURATION: 97 % | WEIGHT: 262 LBS | TEMPERATURE: 97.8 F | SYSTOLIC BLOOD PRESSURE: 120 MMHG | HEIGHT: 71 IN | HEART RATE: 73 BPM | DIASTOLIC BLOOD PRESSURE: 80 MMHG

## 2018-01-23 NOTE — CONSULTS
Chief Complaint  Bilateral lower quadrant abdominal cramping  Loose stools      History of Present Illness  27-year-old male who began having loose stools on October 17 and lasted 3 weeks  He has lingering bilateral lower quadrant abdominal pain relieved by bowel movements  The pain is cramping in nature and was helped by endoscopy and that was prescribed by his PCP  He reports no prior travel to the loose stools, antibiotic use or change in diet  He did go hunting for 3 weeks with loose stools in [place]  He denies melena, hematochezia, unintentional weight loss, fever or fatigue  There patient reports that loose stools subside left to 3 weeks and even though his stools are softer than normal he is only having one a day  He takes a probiotic daily and I asked him to increase it to one tablet twice daily  He will also increase fiber in his diet and we did discuss Benefiber but he will wait until after the colonoscopy  He does have some gas discomfort and I suggested Gas-X  I also suggested staying away from/dairy/lactose  His last colonoscopy was in 2014 and he has a history of polyps  He has no family history of colon cancer  We did discuss Colon spasms and loose stools  But we cannot diagnose and without a colonoscopy so he will get a colonoscopy and follow-up afterwards  Recent blood work is normal      Review of Systems    Constitutional: as noted in HPI    ENT: no sore throat and no hoarseness  Cardiovascular: as noted in HPI and no chest pain  Respiratory: no shortness of breath  Gastrointestinal: as noted in HPI  Genitourinary: no dysuria  Musculoskeletal: no arthralgias  Integumentary: no rashes  Neurological: no headache  Psychiatric: sleep disturbances  Endocrine: no muscle weakness  Hematologic/Lymphatic: no tendency for easy bleeding  Active Problems    1  Abdominal pain, generalized (789 07) (R10 84)   2  Acute bronchitis (466 0) (J20 9)   3   Bruit of right carotid artery (785  9) (R09 89)   4  Chest pain, exertional (786 50) (R07 9)   5  Diarrhea, unspecified type (787 91) (R19 7)   6  Encounter for prostate cancer screening (V76 44) (Z12 5)   7  Encounter for screening colonoscopy (V76 51) (Z12 11)   8  Essential hypertension (401 9) (I10)   9  Exertional dyspnea (786 09) (R06 09)   10  Hyperlipidemia, mixed (272 2) (E78 2)   11  Need for diphtheria-tetanus-pertussis (Tdap) vaccine (V06 1) (Z23)   12  Need for influenza vaccination (V04 81) (Z23)   13  Other insomnia (780 52) (G47 09)   14  Screening for genitourinary condition (V81 6) (Z13 89)   15  Seborrheic keratosis (702 19) (L82 1)   16  Sinusitis, bacterial (473 9,041 9) (J32 9,B96 89)   17  Skin lesion of back (709 9) (L98 9)    Past Medical History    · History of Essential hypertension with goal blood pressure less than 130/85 (401 9)  (I10)   · History of diarrhea (V12 79) (E27 791)   · History of diarrhea (V12 79) (T07 773)   · History of hyperlipidemia (V12 29) (Z86 39)   · History of Screening for genitourinary condition (V81 6) (Z13 89)    The active problems and past medical history were reviewed and updated today  Surgical History    · History of Cataract Surgery   · History of Cholecystectomy   · History of Knee Replacement   · History of Total Hip Replacement    The surgical history was reviewed and updated today  Family History    · Family history of Diabetes (250 00) (E11 9)   · Denied: Family history of mental disorder   · Denied: Family history of substance abuse    · Family history of CAD (coronary artery disease)   · Denied: Family history of mental disorder   · Denied: Family history of substance abuse   · Family history of Ruptured abdominal aortic aneurysm (AAA)    · Family history of CAD (coronary artery disease)    · Family history of CAD (coronary artery disease)    The family history was reviewed and updated today         Social History    · Daily caffeine consumption   · Does not drink alcohol (V49 89) (Z78 9)   · Does not use illicit drugs (W41 98) (Z78 9)   · Former smoker (V15 82) (Z87 891)   ·    · Retired  The social history was reviewed and updated today  Current Meds   1  Levsin 0 125 MG Oral Tablet; TAKE 1 TABLET 3 times daily PRN abdominal pain; Therapy: 93HZN4050 to (Evaluate:33Uia8781)  Requested for: 34QGH1227; Last   Rx:10Jsh9092 Ordered   2  Quinapril HCl - 10 MG Oral Tablet; take 1 tablet every day; Therapy: 18NOQ6871 to (MACKPXSY:60ISX4251)  Requested for: 28Jun2017; Last   Rx:28Jun2017 Ordered   3  Simvastatin 40 MG Oral Tablet; Take 1 tablet daily; Therapy: (Recorded:20Apr2017) to Recorded   4  TraZODone HCl - 50 MG Oral Tablet; TAKE 1 TABLET AT BEDTIME Recorded    The medication list was reviewed and updated today  Allergies    1  No Known Drug Allergies    Vitals   Recorded: 66SYQ7706 10:16AM   Heart Rate 68   Systolic 561   Diastolic 70   Height 5 ft 11 in   Weight 266 lb 8 0 oz   BMI Calculated 37 17   BSA Calculated 2 38     Physical Exam    Constitutional   General appearance: No acute distress, well appearing and well nourished  Eyes anicteric  Pupils and irises: Equal, round and reactive to light  Ears, Nose, Mouth, and Throat   External inspection of ears and nose: Normal     Nasal mucosa, septum, and turbinates: Normal without edema or erythema  Oropharynx: Normal with no erythema, edema, exudate or lesions  Pulmonary   Respiratory effort: No increased work of breathing or signs of respiratory distress  Auscultation of lungs: Clear to auscultation, equal breath sounds bilaterally, no wheezes, no rales, no rhonci  Cardiovascular   Auscultation of heart: Normal rate and rhythm, normal S1 and S2, without murmurs  Examination of extremities for edema and/or varicosities: Normal     Carotid pulses: Normal     Abdomen   Abdomen: Non-tender, no masses  Liver and spleen: No hepatomegaly or splenomegaly      Lymphatic   Palpation of lymph nodes in neck: No lymphadenopathy  Musculoskeletal   Digits and nails: Normal without clubbing or cyanosis  Inspection/palpation of joints, bones, and muscles: Normal     Skin   Skin and subcutaneous tissue: Normal without rashes or lesions  Neurologic   Cranial nerves: Cranial nerves 2-12 intact  Reflexes: 2+ and symmetric  Sensation: No sensory loss  Psychiatric   Orientation to person, place and time: Normal     Mood and affect: Normal          Assessment    1  Abdominal pain, bilateral lower quadrant (789 03,789 04) (R10 31,R10 32)   2  Loose stools (787 7) (R19 5)   3  Abdominal gas pain (787 3) (R14 1)    Plan  Abdominal gas pain    · Phazyme Ultra Strength 180 MG Oral Capsule; TAKE 1 CAPSULE 4 TIMES DAILY  AS NEEDED   Rx By: Hao Balderrama; Dispense: 8 Days ; #:30 Capsule; Refill: 0; For: Abdominal gas pain; OSMAN = N; Verified Transmission to [facility] #[id number] Last Updated By: System, [employer]; 12/26/2017 11:10:07 AM  Abdominal pain, bilateral lower quadrant    · Hyoscyamine Sulfate 0 125 MG Oral Tablet; TAKE 1 TABLET EVERY 6 HOURS AS  NEEDED   Rx By: Hao Balderrama; Dispense: 8 Days ; #:30 Tablet; Refill: 3; For: Abdominal pain, bilateral lower quadrant; OSMAN = N; [id number] Last Updated By: System, [employer]; 12/26/2017 11:10:07 AM   · ENDOSCOPY - PROCEDURE, RISKS, AND BENEFITS; Status:Complete;   Done:  13TSC1142   Ordered; For:Abdominal pain, bilateral lower quadrant; Ordered By:Savanah Sharp;   · COLONOSCOPY; Status:Active; Requested for:93Lol1902;    Perform:[facility]; Due:57Abf3181; Ordered; For:Abdominal pain, bilateral lower quadrant; Ordered By:Savanah Sharp; Abdominal pain, generalized, Diarrhea, unspecified type    · Levsin 0 125 MG Oral Tablet   Rx By: Lacey Mcgraw; Dispense: 10 Days ; #:30 Tablet;  Refill: 0; For: Abdominal pain, generalized, Diarrhea, unspecified type; OSMAN = N; Verified Transmission to [facility] #[id number]; Last Updated By: Samia Sanchez; 12/26/2017 10:15:12 AM    Discussion/Summary    Colonoscopy is scheduled  Patient's last colonoscopy was in 2014  I asked melena as needed  Please take every 6 hours for next 3 days and then every 6 hours as needed for abdominal cramping  Gas-X as needed for gas pain  Increase fiber and stay away from lactose        Future Appointments    Signatures   Electronically signed by : Rosalina Kimble; Dec 26 2017 11:07AM EST                       (Author)    Electronically signed by : GRECIA Jacobson ; Dec 26 2017 11:24AM EST                       (Author)

## 2018-01-24 VITALS
HEIGHT: 71 IN | WEIGHT: 266.5 LBS | HEART RATE: 68 BPM | SYSTOLIC BLOOD PRESSURE: 122 MMHG | BODY MASS INDEX: 37.31 KG/M2 | DIASTOLIC BLOOD PRESSURE: 70 MMHG

## 2018-02-08 ENCOUNTER — TELEPHONE (OUTPATIENT)
Dept: GASTROENTEROLOGY | Facility: CLINIC | Age: 68
End: 2018-02-08

## 2018-02-08 NOTE — TELEPHONE ENCOUNTER
ptn called to let Dr Vel Hennessy know that the diet he put him on is helping a little except he still has a lot of gas and a little bit of pain/ aches  He would like to know what to do about this

## 2018-03-09 RX ORDER — SIMETHICONE 180 MG
1 CAPSULE ORAL 4 TIMES DAILY PRN
COMMUNITY
Start: 2017-12-26 | End: 2018-04-25

## 2018-03-09 RX ORDER — DIPHENOXYLATE HYDROCHLORIDE AND ATROPINE SULFATE 2.5; .025 MG/1; MG/1
1 TABLET ORAL 4 TIMES DAILY PRN
COMMUNITY
Start: 2017-10-18 | End: 2018-04-25

## 2018-03-22 DIAGNOSIS — E78.2 MIXED HYPERLIPIDEMIA: ICD-10-CM

## 2018-03-22 DIAGNOSIS — Z12.5 ENCOUNTER FOR SCREENING FOR MALIGNANT NEOPLASM OF PROSTATE: ICD-10-CM

## 2018-04-11 ENCOUNTER — APPOINTMENT (OUTPATIENT)
Dept: LAB | Facility: CLINIC | Age: 68
End: 2018-04-11
Payer: MEDICARE

## 2018-04-11 DIAGNOSIS — E78.2 MIXED HYPERLIPIDEMIA: ICD-10-CM

## 2018-04-11 DIAGNOSIS — Z12.5 ENCOUNTER FOR SCREENING FOR MALIGNANT NEOPLASM OF PROSTATE: ICD-10-CM

## 2018-04-11 LAB
ALBUMIN SERPL BCP-MCNC: 3.7 G/DL (ref 3.5–5)
ALP SERPL-CCNC: 41 U/L (ref 46–116)
ALT SERPL W P-5'-P-CCNC: 30 U/L (ref 12–78)
ANION GAP SERPL CALCULATED.3IONS-SCNC: 6 MMOL/L (ref 4–13)
AST SERPL W P-5'-P-CCNC: 17 U/L (ref 5–45)
BILIRUB SERPL-MCNC: 1.17 MG/DL (ref 0.2–1)
BUN SERPL-MCNC: 16 MG/DL (ref 5–25)
CALCIUM SERPL-MCNC: 8.6 MG/DL
CHLORIDE SERPL-SCNC: 111 MMOL/L (ref 100–108)
CHOLEST SERPL-MCNC: 137 MG/DL (ref 50–200)
CO2 SERPL-SCNC: 26 MMOL/L (ref 21–32)
CREAT SERPL-MCNC: 1.04 MG/DL (ref 0.6–1.3)
GFR SERPL CREATININE-BSD FRML MDRD: 73 ML/MIN/1.73SQ M
GLUCOSE P FAST SERPL-MCNC: 106 MG/DL (ref 65–99)
HDLC SERPL-MCNC: 42 MG/DL (ref 40–60)
LDLC SERPL CALC-MCNC: 72 MG/DL (ref 0–100)
NONHDLC SERPL-MCNC: 95 MG/DL
POTASSIUM SERPL-SCNC: 3.9 MMOL/L (ref 3.5–5.3)
PROT SERPL-MCNC: 7 G/DL (ref 6.4–8.2)
PSA SERPL-MCNC: 0.5 NG/ML (ref 0–4)
SODIUM SERPL-SCNC: 143 MMOL/L (ref 136–145)
TRIGL SERPL-MCNC: 114 MG/DL

## 2018-04-11 PROCEDURE — 36415 COLL VENOUS BLD VENIPUNCTURE: CPT

## 2018-04-11 PROCEDURE — 80061 LIPID PANEL: CPT

## 2018-04-11 PROCEDURE — 80053 COMPREHEN METABOLIC PANEL: CPT

## 2018-04-11 PROCEDURE — G0103 PSA SCREENING: HCPCS

## 2018-04-18 RX ORDER — QUINAPRIL 10 MG/1
10 TABLET ORAL DAILY
Refills: 3 | COMMUNITY
Start: 2018-03-20 | End: 2018-04-25 | Stop reason: SDUPTHER

## 2018-04-25 ENCOUNTER — OFFICE VISIT (OUTPATIENT)
Dept: FAMILY MEDICINE CLINIC | Facility: CLINIC | Age: 68
End: 2018-04-25
Payer: MEDICARE

## 2018-04-25 VITALS
OXYGEN SATURATION: 96 % | DIASTOLIC BLOOD PRESSURE: 78 MMHG | BODY MASS INDEX: 36.16 KG/M2 | HEIGHT: 72 IN | SYSTOLIC BLOOD PRESSURE: 120 MMHG | WEIGHT: 267 LBS | TEMPERATURE: 97.3 F | HEART RATE: 76 BPM

## 2018-04-25 DIAGNOSIS — E78.2 MIXED HYPERLIPIDEMIA: ICD-10-CM

## 2018-04-25 DIAGNOSIS — I10 ESSENTIAL HYPERTENSION: Primary | ICD-10-CM

## 2018-04-25 DIAGNOSIS — R73.9 ELEVATED BLOOD SUGAR: ICD-10-CM

## 2018-04-25 PROCEDURE — 99214 OFFICE O/P EST MOD 30 MIN: CPT | Performed by: FAMILY MEDICINE

## 2018-04-25 RX ORDER — QUINAPRIL 10 MG/1
10 TABLET ORAL DAILY
Qty: 90 TABLET | Refills: 3 | Status: SHIPPED | OUTPATIENT
Start: 2018-04-25 | End: 2019-05-31 | Stop reason: SDUPTHER

## 2018-04-25 NOTE — PROGRESS NOTES
Assessment/Plan:       Diagnoses and all orders for this visit:    Essential hypertension  -     Comprehensive metabolic panel; Future  -     TSH, 3rd generation; Future  -     quinapril (ACCUPRIL) 10 mg tablet; Take 1 tablet (10 mg total) by mouth daily    Mixed hyperlipidemia    Elevated blood sugar        No problem-specific Assessment & Plan notes found for this encounter  Encouraged him to try saw palmetto for his prostate, he is to let us know if symptoms worsen and consider medications  continue the remainder his medications, we will see him back in 6 months time with a repeat of CMP and a TSH done prior  Subjective:      Patient ID: Jessica Ramos is a 75 y o  male  Patient comes in today for checkup  He is taking his Accupril for his blood pressure, no compliance issues, no side effects  He is taking his simvastatin for his cholesterol, no compliance issues no side effects  He did have his blood work done as this was reviewed with him today  He continues take his trazodone to help him sleep at night which is helping  He does complain of some intermittent nocturia, he states that he has a weak stream at bedtime, and some incomplete emptying  He denies any symptoms during the day  The following portions of the patient's history were reviewed and updated as appropriate:   He has a past medical history of Cardiac disease; Fungal dermatitis; Hyperlipidemia; Hypertension; MRSA (methicillin resistant Staphylococcus aureus) infection; Post traumatic stress disorder (PTSD); and Psychiatric disorder  ,   does not have any pertinent problems on file  ,   has a past surgical history that includes Total knee arthroplasty (Bilateral, 2014); Total hip arthroplasty (Bilateral, 2009, 2010); Hernia repair (N/A, unknown, supraumbilical); CHOLECYSTECTOMY LAPAROSCOPIC (N/A, 3/7/2016); Joint replacement (Bilateral);  Cholecystectomy; Colonoscopy; pr colonoscopy flx dx w/collj spec when pfrmd (N/A, 1/9/2018); and Cataract extraction  ,  family history includes Aortic aneurysm in his father; Coronary artery disease in his father, paternal grandfather, and paternal grandmother; Diabetes in his mother; Heart disease in his father  ,   reports that he has quit smoking  His smoking use included Cigarettes  He quit after 40 00 years of use  He has never used smokeless tobacco  He reports that he does not drink alcohol or use drugs  ,  is allergic to morphine and related     Current Outpatient Prescriptions   Medication Sig Dispense Refill    quinapril (ACCUPRIL) 10 mg tablet Take 1 tablet (10 mg total) by mouth daily 90 tablet 3    simvastatin (ZOCOR) 80 mg tablet Take 80 mg by mouth daily at bedtime   traZODone (DESYREL) 50 mg tablet Take 150 mg by mouth daily at bedtime  No current facility-administered medications for this visit  Review of Systems   Constitutional: Negative for chills, fatigue and fever  HENT: Negative for congestion, ear pain, hearing loss, postnasal drip, rhinorrhea and sore throat  Eyes: Negative for pain and visual disturbance  Respiratory: Negative for chest tightness, shortness of breath and wheezing  Cardiovascular: Negative for chest pain and leg swelling  Gastrointestinal: Negative for abdominal distention, abdominal pain, constipation, diarrhea and vomiting  Endocrine: Negative for cold intolerance and heat intolerance  Genitourinary: Negative for difficulty urinating, frequency and urgency  nocturia   Musculoskeletal: Negative for arthralgias and gait problem  Skin: Negative for color change  Neurological: Negative for dizziness, tremors, syncope, numbness and headaches  Hematological: Negative for adenopathy  Psychiatric/Behavioral: Negative for agitation, confusion and sleep disturbance  The patient is not nervous/anxious            Objective:  Vitals:    04/25/18 1416   BP: 120/78   Pulse: 76   Temp: (!) 97 3 °F (36 3 °C)   SpO2: 96% Weight: 121 kg (267 lb)   Height: 6' (1 829 m)     Body mass index is 36 21 kg/m²  Physical Exam   Constitutional: He is oriented to person, place, and time  He appears well-developed and well-nourished  HENT:   Head: Normocephalic  Mouth/Throat: Oropharynx is clear and moist    Eyes: Conjunctivae are normal  No scleral icterus  Neck: Normal range of motion  No thyromegaly present  Cardiovascular: Normal rate, regular rhythm and normal heart sounds  No murmur heard  Pulmonary/Chest: Effort normal and breath sounds normal  No respiratory distress  He has no wheezes  Abdominal: Soft  Bowel sounds are normal  He exhibits no distension  There is no tenderness  Musculoskeletal: Normal range of motion  He exhibits no edema or tenderness  Lymphadenopathy:     He has no cervical adenopathy  Neurological: He is alert and oriented to person, place, and time  No cranial nerve deficit  Skin: Skin is warm and dry  No rash noted  No pallor  Psychiatric: He has a normal mood and affect  His behavior is normal    Nursing note and vitals reviewed

## 2018-04-25 NOTE — PATIENT INSTRUCTIONS
Hypertension   AMBULATORY CARE:   Hypertension  is high blood pressure (BP)  Your BP is the force of your blood moving against the walls of your arteries  Normal BP is less than 120/80  Prehypertension is between 120/80 and 139/89  Hypertension is 140/90 or higher  Hypertension causes your BP to get so high that your heart has to work much harder than normal  This can damage your heart  You can control hypertension with a healthy lifestyle or medicines  A controlled blood pressure helps protect your organs, such as your heart, lungs, brain, and kidneys  Common symptoms include the following:   · Headache     · Blurred vision     · Chest pain     · Dizziness or weakness     · Trouble breathing    · Nosebleeds  Call 911 for any of the following:   · You have discomfort in your chest that feels like squeezing, pressure, fullness, or pain  · You become confused or have difficulty speaking  · You suddenly feel lightheaded or have trouble breathing  · You have pain or discomfort in your back, neck, jaw, stomach, or arm  Seek care immediately if:   · You have a severe headache or vision loss  · You have weakness in an arm or leg  Contact your healthcare provider if:   · You feel faint, dizzy, confused, or drowsy  · You have been taking your BP medicine and your BP is still higher than your healthcare provider says it should be  · You have questions or concerns about your condition or care  Treatment for hypertension  may include medicine to lower your BP and lower your cholesterol level  A low cholesterol level helps prevent heart disease and makes it easier to control your blood pressure  You may also need to make lifestyle changes  Take your medicine exactly as directed  Manage hypertension:  Talk with your healthcare provider about these and other ways to manage hypertension:  · Check your BP at home  Sit and rest for 5 minutes before you take your BP   Extend your arm and support it on a flat surface  Your arm should be at the same level as your heart  Follow the directions that came with your BP monitor  If possible, take at least 2 BP readings each time  Take your BP at least twice a day at the same times each day, such as morning and evening  Keep a record of your BP readings and bring it to your follow-up visits  Ask your healthcare provider what your BP should be  · Limit sodium (salt) as directed  Too much sodium can affect your fluid balance  Check labels to find low-sodium or no-salt-added foods  Some low-sodium foods use potassium salts for flavor  Too much potassium can also cause health problems  Your healthcare provider will tell you how much sodium and potassium are safe for you to have in a day  He or she may recommend that you limit sodium to 2,300 mg a day  · Follow the meal plan recommended by your healthcare provider  A dietitian or your provider can give you more information on low-sodium plans or the DASH (Dietary Approaches to Stop Hypertension) eating plan  The DASH plan is low in sodium, unhealthy fats, and total fat  It is high in potassium, calcium, and fiber  · Exercise to maintain a healthy weight  Exercise at least 30 minutes per day, on most days of the week  This will help decrease your blood pressure  Ask your healthcare provider about the best exercise plan for you  · Decrease stress  This may help lower your BP  Learn ways to relax, such as deep breathing or listening to music  · Limit alcohol  Women should limit alcohol to 1 drink a day  Men should limit alcohol to 2 drinks a day  A drink of alcohol is 12 ounces of beer, 5 ounces of wine, or 1½ ounces of liquor  · Do not smoke  Nicotine and other chemicals in cigarettes and cigars can increase your BP and also cause lung damage  Ask your healthcare provider for information if you currently smoke and need help to quit  E-cigarettes or smokeless tobacco still contain nicotine  Talk to your healthcare provider before you use these products  · Manage any other health conditions you have  Health conditions such as diabetes can increase your risk for hypertension  Follow your healthcare provider's instructions and take all your medicines as directed  Follow up with your healthcare provider as directed: You will need to return to have your BP checked and to have other lab tests done  Write down your questions so you remember to ask them during your visits  © 2017 [employer] Information is for End User's use only and may not be sold, redistributed or otherwise used for commercial purposes  All illustrations and images included in CareNotes® are the copyrighted property of [employer]  or [employer]  The above information is an  only  It is not intended as medical advice for individual conditions or treatments  Talk to your doctor, nurse or pharmacist before following any medical regimen to see if it is safe and effective for you

## 2018-07-25 ENCOUNTER — APPOINTMENT (EMERGENCY)
Dept: ULTRASOUND IMAGING | Facility: HOSPITAL | Age: 68
End: 2018-07-25
Payer: MEDICARE

## 2018-07-25 ENCOUNTER — APPOINTMENT (EMERGENCY)
Dept: RADIOLOGY | Facility: HOSPITAL | Age: 68
End: 2018-07-25
Payer: MEDICARE

## 2018-07-25 ENCOUNTER — HOSPITAL ENCOUNTER (EMERGENCY)
Facility: HOSPITAL | Age: 68
Discharge: HOME | End: 2018-07-25
Attending: EMERGENCY MEDICINE | Admitting: EMERGENCY MEDICINE
Payer: MEDICARE

## 2018-07-25 VITALS
HEIGHT: 72 IN | BODY MASS INDEX: 34.54 KG/M2 | TEMPERATURE: 98.6 F | RESPIRATION RATE: 20 BRPM | DIASTOLIC BLOOD PRESSURE: 69 MMHG | WEIGHT: 255 LBS | SYSTOLIC BLOOD PRESSURE: 138 MMHG | HEART RATE: 69 BPM | OXYGEN SATURATION: 93 %

## 2018-07-25 DIAGNOSIS — M25.474 SWELLING OF FOOT JOINT, RIGHT: Primary | ICD-10-CM

## 2018-07-25 PROCEDURE — 99284 EMERGENCY DEPT VISIT MOD MDM: CPT

## 2018-07-25 PROCEDURE — 93971 EXTREMITY STUDY: CPT

## 2018-07-25 PROCEDURE — 73610 X-RAY EXAM OF ANKLE: CPT

## 2018-07-25 PROCEDURE — 93971 EXTREMITY STUDY: CPT | Performed by: SURGERY

## 2018-07-26 NOTE — DISCHARGE INSTRUCTIONS
Swollen Joint   WHAT YOU NEED TO KNOW:   Joint swelling may occur in one or more joints  You may have other symptoms, such as pain, tenderness, or stiffness  A swollen joint may be caused by a variety of conditions such as arthritis, pseudogout, gout, tendinitis, or injury  DISCHARGE INSTRUCTIONS:   Return to the emergency department if:   · You cannot move your joint at all  · You have severe pain that does not get better with medicine  Contact your healthcare provider if:   · You have a fever  · You have redness or warmth over the joint  · The swelling does not decrease with treatment  · You have questions or concerns about your condition or care  Medicines:   · NSAIDs , such as ibuprofen, help decrease swelling, pain, and fever  This medicine is available with or without a doctor's order  NSAIDs can cause stomach bleeding or kidney problems in certain people  If you take blood thinner medicine, always ask your healthcare provider if NSAIDs are safe for you  Always read the medicine label and follow directions  · Take your medicine as directed  Contact your healthcare provider if you think your medicine is not helping or if you have side effects  Tell him of her if you are allergic to any medicine  Keep a list of the medicines, vitamins, and herbs you take  Include the amounts, and when and why you take them  Bring the list or the pill bottles to follow-up visits  Carry your medicine list with you in case of an emergency  Follow up with your healthcare provider as directed:  Write down your questions so you remember to ask them during your visits  Self-care:   · Rest  your swollen joint  Avoid activities that make the swelling or pain worse  You may need to avoid putting weight on your joint while you have pain  Crutches or a walker can be used to avoid putting weight on joints in your lower body  · Apply ice  on your swollen joint for 15 to 20 minutes every hour or as directed   Use an ice pack, or put crushed ice in a plastic bag  Cover it with a towel  Ice helps prevent tissue damage and decreases swelling and pain  · Apply heat  on your swollen joint for 20 to 30 minutes every 2 hours for as many days as directed  Heat helps decrease pain  · Elevate  your swollen joint above the level of your heart as often as you can  This will help decrease swelling and pain  Prop your joint on pillows or blankets to keep it elevated comfortably  © 2017 [employer] Information is for End User's use only and may not be sold, redistributed or otherwise used for commercial purposes  All illustrations and images included in CareNotes® are the copyrighted property of [employer]  or [employer]  The above information is an  only  It is not intended as medical advice for individual conditions or treatments  Talk to your doctor, nurse or pharmacist before following any medical regimen to see if it is safe and effective for you

## 2018-07-27 NOTE — ED PROVIDER NOTES
History  Chief Complaint   Patient presents with    Ankle Pain     Patient c/o right ankle pain and swelling and is unsure how it happened  HPI  65-year-old pleasant male with history of CAD, HLD, and HTN presents to the ED with complaint of right ankle swelling  Patient states that he has had persistent swelling of his right ankle for the past week  He had pain along the medial aspect of his ankle a few days after swelling started, but pain has since resolved  Patient has not noted any modifying factors for his symptoms and denies having had similar symptoms in the past   He denies any known trauma, history of gout, or history of DVT  Of note, patient drove to [place] about 1 month ago (3 weeks prior to onset of swelling) and recently returned  While in [place] he was walking a lot, playing golf, and mainly wearing sandals without support  ROS negative for weakness, numbness, paresthesias, or complaints other than stated above  Prior to Admission Medications   Prescriptions Last Dose Informant Patient Reported? Taking?   quinapril (ACCUPRIL) 10 mg tablet   No No   Sig: Take 1 tablet (10 mg total) by mouth daily   simvastatin (ZOCOR) 80 mg tablet   Yes No   Sig: Take 80 mg by mouth daily at bedtime  traZODone (DESYREL) 50 mg tablet   Yes No   Sig: Take 150 mg by mouth daily at bedtime  Facility-Administered Medications: None       Past Medical History:   Diagnosis Date    Cardiac disease     Fungal dermatitis     Hyperlipidemia     Hypertension     per Allscripts: Essential hypertension ith goal blood pressure less than 130/85;  Resolved:4/20/17    MRSA (methicillin resistant Staphylococcus aureus) infection     Post traumatic stress disorder (PTSD)     Psychiatric disorder      Past Surgical History:   Procedure Laterality Date    CATARACT EXTRACTION      CHOLECYSTECTOMY      CHOLECYSTECTOMY LAPAROSCOPIC N/A 3/7/2016    Procedure: CHOLECYSTECTOMY LAPAROSCOPIC;  Surgeon: Uziel Carvajal DO;  [facility];  Service:     COLONOSCOPY      HERNIA REPAIR N/A unknown, supraumbilical    JOINT REPLACEMENT Bilateral     hips and knees    SD COLONOSCOPY FLX DX W/COLLJ SPEC WHEN PFRMD N/A 1/9/2018    Procedure: COLONOSCOPY;  Surgeon: Becky Rosa MD;  [facility]; Service: Gastroenterology    TOTAL HIP ARTHROPLASTY Bilateral 2009, 2010    TOTAL KNEE ARTHROPLASTY Bilateral 2014       Family History   Problem Relation Age of Onset    Diabetes Mother     Heart disease Father     Coronary artery disease Father     Aortic aneurysm Father         Ruptured abdominal aortic aneurysm    Coronary artery disease Paternal Grandmother     Coronary artery disease Paternal Grandfather      I have reviewed and agree with the history as documented  Social History   Substance Use Topics    Smoking status: Former Smoker     Years: 40 00     Types: Cigarettes    Smokeless tobacco: Former User    Alcohol use No        Review of Systems   Constitutional: Negative for chills and fever  Respiratory: Negative for shortness of breath  Cardiovascular: Negative for chest pain  Gastrointestinal: Negative for abdominal pain, nausea and vomiting  Musculoskeletal: Positive for joint swelling  Skin: Negative for rash and wound  Hematological: Does not bruise/bleed easily  Psychiatric/Behavioral: The patient is not nervous/anxious  All other systems reviewed and are negative  Physical Exam  Physical Exam   Constitutional: He is oriented to person, place, and time  He appears well-nourished  No distress  HENT:   Head: Normocephalic and atraumatic  Eyes: EOM are normal    Neck: Normal range of motion  Neck supple  Cardiovascular: Normal rate and regular rhythm  Pulmonary/Chest: Effort normal and breath sounds normal  No respiratory distress  Abdominal: Soft  He exhibits no distension  There is no tenderness  Musculoskeletal: Normal range of motion          Right ankle: He exhibits swelling  He exhibits normal range of motion  No tenderness  Right foot: There is swelling  There is normal range of motion, no tenderness, no bony tenderness and normal capillary refill  Neurological: He is alert and oriented to person, place, and time  Skin: Skin is warm and dry  He is not diaphoretic  Psychiatric: He has a normal mood and affect  His behavior is normal    Nursing note and vitals reviewed  Vital Signs  ED Triage Vitals [07/25/18 1755]   Temperature Pulse Respirations Blood Pressure SpO2   98 6 °F (37 °C) 69 20 138/69 93 %      Temp Source Heart Rate Source Patient Position - Orthostatic VS BP Location FiO2 (%)   Oral Monitor Lying Right arm --      Pain Score       --           Vitals:    07/25/18 1755   BP: 138/69   Pulse: 69   Patient Position - Orthostatic VS: Lying       Visual Acuity      ED Medications  Medications - No data to display    Diagnostic Studies  Results Reviewed     None                 VAS lower limb venous duplex study, unilateral/limited   Final Result by Prince Les DO (07/25 2103)      XR ankle 3+ views RIGHT   Final Result by Roxy Boyd MD (07/25 1918)      No acute fracture  Small corticated osseous fragments in keeping with chronic fracture injuries  Workstation performed: [id number]                    Procedures  Procedures       Phone Contacts  ED Phone Contact    ED Course  ED Course as of Jul 27 0135 Wed Jul 25, 2018 1956 Duplex negative                                MDM  Number of Diagnoses or Management Options  Swelling of foot joint, right:   Diagnosis management comments: 43-year-old male with atraumatic swelling of right ankle, transient pain which was noted only after swelling began and has since resolved  Will obtain XR ankle, lower extremity duplex if XR negative  Tests negative  Swelling possibly related to overuse especially given evidence of old injury and degeneration   Will discharge home with podiatry follow up, return precaution, sx management with elevation, NSAIDs/tylenol for pain  CritCare Time    Disposition  Final diagnoses:   Swelling of foot joint, right     Time reflects when diagnosis was documented in both MDM as applicable and the Disposition within this note     Time User Action Codes Description Comment    7/25/2018  7:57 PM Rita Khan Add [[id number] [id number]] Swelling of foot joint, right       ED Disposition     ED Disposition Condition Comment    Discharge  [address] [facility] [place] discharge to home/self care  Condition at discharge: Good        Follow-up Information     Follow up With Specialties Details Why [facility] Schedule an appointment as soon as possible for a visit  [facility] [place] [address] [place] [id number]  [phone]            Discharge Medication List as of 7/25/2018  8:00 PM      CONTINUE these medications which have NOT CHANGED    Details   quinapril (ACCUPRIL) 10 mg tablet Take 1 tablet (10 mg total) by mouth daily, Starting Wed 4/25/2018, Normal      simvastatin (ZOCOR) 80 mg tablet Take 80 mg by mouth daily at bedtime  , Until Discontinued, Historical Med      traZODone (DESYREL) 50 mg tablet Take 150 mg by mouth daily at bedtime  , Until Discontinued, Historical Med           No discharge procedures on file      ED Provider  Electronically Signed by           Krish Pratt MD  07/27/18 5759

## 2018-08-28 LAB — HBA1C MFR BLD HPLC: 5.5 %

## 2018-10-05 ENCOUNTER — APPOINTMENT (OUTPATIENT)
Dept: LAB | Facility: CLINIC | Age: 68
End: 2018-10-05
Payer: MEDICARE

## 2018-10-05 DIAGNOSIS — I10 ESSENTIAL HYPERTENSION: ICD-10-CM

## 2018-10-05 LAB
ALBUMIN SERPL BCP-MCNC: 3.8 G/DL (ref 3.5–5)
ALP SERPL-CCNC: 44 U/L (ref 46–116)
ALT SERPL W P-5'-P-CCNC: 28 U/L (ref 12–78)
ANION GAP SERPL CALCULATED.3IONS-SCNC: 5 MMOL/L (ref 4–13)
AST SERPL W P-5'-P-CCNC: 15 U/L (ref 5–45)
BILIRUB SERPL-MCNC: 1.01 MG/DL (ref 0.2–1)
BUN SERPL-MCNC: 22 MG/DL (ref 5–25)
CALCIUM SERPL-MCNC: 9 MG/DL (ref 8.3–10.1)
CHLORIDE SERPL-SCNC: 107 MMOL/L (ref 100–108)
CO2 SERPL-SCNC: 30 MMOL/L (ref 21–32)
CREAT SERPL-MCNC: 0.98 MG/DL (ref 0.6–1.3)
GFR SERPL CREATININE-BSD FRML MDRD: 78 ML/MIN/1.73SQ M
GLUCOSE P FAST SERPL-MCNC: 92 MG/DL (ref 65–99)
POTASSIUM SERPL-SCNC: 4 MMOL/L (ref 3.5–5.3)
PROT SERPL-MCNC: 7.4 G/DL (ref 6.4–8.2)
SODIUM SERPL-SCNC: 142 MMOL/L (ref 136–145)
TSH SERPL DL<=0.05 MIU/L-ACNC: 2.1 UIU/ML (ref 0.36–3.74)

## 2018-10-05 PROCEDURE — 80053 COMPREHEN METABOLIC PANEL: CPT

## 2018-10-05 PROCEDURE — 84443 ASSAY THYROID STIM HORMONE: CPT

## 2018-10-05 PROCEDURE — 36415 COLL VENOUS BLD VENIPUNCTURE: CPT

## 2018-10-17 ENCOUNTER — OFFICE VISIT (OUTPATIENT)
Dept: FAMILY MEDICINE CLINIC | Facility: CLINIC | Age: 68
End: 2018-10-17
Payer: MEDICARE

## 2018-10-17 VITALS
DIASTOLIC BLOOD PRESSURE: 66 MMHG | HEIGHT: 72 IN | HEART RATE: 71 BPM | WEIGHT: 253.4 LBS | SYSTOLIC BLOOD PRESSURE: 108 MMHG | OXYGEN SATURATION: 97 % | BODY MASS INDEX: 34.32 KG/M2 | TEMPERATURE: 98.5 F

## 2018-10-17 DIAGNOSIS — I10 ESSENTIAL HYPERTENSION: ICD-10-CM

## 2018-10-17 DIAGNOSIS — E78.2 MIXED HYPERLIPIDEMIA: ICD-10-CM

## 2018-10-17 DIAGNOSIS — Z00.00 HEALTH CARE MAINTENANCE: Primary | ICD-10-CM

## 2018-10-17 DIAGNOSIS — Z23 NEED FOR VACCINATION: ICD-10-CM

## 2018-10-17 PROCEDURE — 99213 OFFICE O/P EST LOW 20 MIN: CPT | Performed by: FAMILY MEDICINE

## 2018-10-17 PROCEDURE — G0009 ADMIN PNEUMOCOCCAL VACCINE: HCPCS

## 2018-10-17 PROCEDURE — G0439 PPPS, SUBSEQ VISIT: HCPCS | Performed by: FAMILY MEDICINE

## 2018-10-17 PROCEDURE — 90732 PPSV23 VACC 2 YRS+ SUBQ/IM: CPT

## 2018-10-17 RX ORDER — LIFITEGRAST 50 MG/ML
SOLUTION/ DROPS OPHTHALMIC
Refills: 6 | COMMUNITY
Start: 2018-08-21 | End: 2020-02-05 | Stop reason: ALTCHOICE

## 2018-10-17 NOTE — PROGRESS NOTES
Assessment and Plan:    Problem List Items Addressed This Visit     None      Visit Diagnoses     Health care maintenance    -  Primary    Need for vaccination        Relevant Orders    Pneumococcal Polysaccharide Vaccine 23-Valent =>3yo SQ IM        Health Maintenance Due   Topic Date Due    Pneumococcal PPSV23/PCV13 65+ Years / Low and Medium Risk (1 of 2 - PCV13) 05/01/2014    INFLUENZA VACCINE  07/01/2018         HPI:  Natalie Wilson is a 70 y o  male here for his Subsequent Wellness Visit  Patient Active Problem List   Diagnosis    Post traumatic stress disorder    Mixed hyperlipidemia    Cardiac disease    Essential hypertension    Elevated blood sugar     Past Medical History:   Diagnosis Date    Cardiac disease     Fungal dermatitis     Hyperlipidemia     Hypertension     per Allscripts: Essential hypertension ith goal blood pressure less than 130/85; Resolved:4/20/17    MRSA (methicillin resistant Staphylococcus aureus) infection     Post traumatic stress disorder (PTSD)     Psychiatric disorder      Past Surgical History:   Procedure Laterality Date    CATARACT EXTRACTION      CHOLECYSTECTOMY      CHOLECYSTECTOMY LAPAROSCOPIC N/A 3/7/2016    Procedure: CHOLECYSTECTOMY LAPAROSCOPIC;  Surgeon: Mark Anthony DO;  [facility];  Service:     COLONOSCOPY      HERNIA REPAIR N/A unknown, supraumbilical    JOINT REPLACEMENT Bilateral     hips and knees    NM COLONOSCOPY FLX DX W/COLLJ SPEC WHEN PFRMD N/A 1/9/2018    Procedure: COLONOSCOPY;  Surgeon: Simin Greenberg MD;  [facility];   Service: Gastroenterology    TOTAL HIP ARTHROPLASTY Bilateral 2009, 2010    TOTAL KNEE ARTHROPLASTY Bilateral 2014     Family History   Problem Relation Age of Onset    Diabetes Mother     Heart disease Father     Coronary artery disease Father     Aortic aneurysm Father         Ruptured abdominal aortic aneurysm    Coronary artery disease Paternal Grandmother     Coronary artery disease Paternal Grandfather      History   Smoking Status    Former Smoker    Years: 40 00    Types: Cigarettes   Smokeless Tobacco    Former User     History   Alcohol Use No      History   Drug Use No       Current Outpatient Prescriptions   Medication Sig Dispense Refill    quinapril (ACCUPRIL) 10 mg tablet Take 1 tablet (10 mg total) by mouth daily 90 tablet 3    simvastatin (ZOCOR) 80 mg tablet Take 80 mg by mouth daily at bedtime   traZODone (DESYREL) 50 mg tablet Take 150 mg by mouth daily at bedtime   XIIDRA 5 % op solution INSTILL 1 DROP INTO BOTH EYES TWICE A DAY PUT 1 DROP IN BOTH EYES TWICE DAILY  6     No current facility-administered medications for this visit  Allergies   Allergen Reactions    Morphine And Related GI Intolerance     Immunization History   Administered Date(s) Administered    Influenza 03/27/2014, 10/15/2018    Influenza Quadrivalent Preservative Free 3 years and older IM 10/04/2016    Influenza Split High Dose Preservative Free IM 10/04/2017       Patient Care Team:  Romario Ariza DO as PCP - General    Medicare Screening Tests and Risk Assessments:  Adrienne Angelo is here for his Subsequent Wellness visit  Last Medicare Wellness visit information reviewed, patient interviewed and updates made to the record today  Health Risk Assessment:  Patient rates overall health as very good  Patient feels that their physical health rating is Same  Eyesight was rated as Slightly worse  Hearing was rated as Same  Patient feels that their emotional and mental health rating is Same  Pain experienced by patient in the last 7 days has been None  Patient states that he has experienced no weight loss or gain in last 6 months  Emotional/Mental Health:  Patient has been feeling nervous/anxious  PHQ-9 Depression Screening:    Frequency of the following problems over the past two weeks:      1  Little interest or pleasure in doing things: 0 - not at all      2   Feeling down, depressed, or hopeless: 0 - not at all  PHQ-2 Score: 0          Broken Bones/Falls: Fall Risk Assessment:    In the past year, patient has experienced: No history of falling in past year          Bladder/Bowel:  Patient has not leaked urine accidently in the last six months  Patient reports no loss of bowel control  Immunizations:  Patient has had a flu vaccination within the last year  Patient has received a pneumonia shot  Patient has not received a shingles shot  Patient has received tetanus/diphtheria shot  Home Safety:  Patient does not have trouble with stairs inside or outside of their home  Patient currently reports that there are no safety hazards present in home, working smoke alarms, working carbon monoxide detectors  Preventative Screenings:   prostate cancer screen performed, colon cancer screen completed, cholesterol screen completed, glaucoma eye exam completed,     Nutrition:  Current diet: Regular and Limited junk food with servings of the following:    Medications:  Patient is not currently taking any over-the-counter supplements  Patient is able to manage medications  Lifestyle Choices:  Patient reports no tobacco use  Patient has smoked or used tobacco in the past   Patient has stopped his tobacco use  Patient reports no alcohol use  Patient drives a vehicle  Patient wears seat belt  Current level of exercise of physical activity described by patient as: daily  Activities of Daily Living:  Can get out of bed by his or her self, able to dress self, able to make own meals, able to do own shopping, able to bathe self, can do own laundry/housekeeping, can manage own money, pay bills and track expenses    Previous Hospitalizations:  Hospitalization or ED visit in past 12 months  Number of hospitalizations within the last year: 1-2        Advanced Directives:  Patient has decided on a power of   Patient has spoken to designated power of     Patient has not completed advanced directive  Preventative Screening/Counseling:      Cardiovascular:      General: Screening Current      Counseling: Healthy Diet and Healthy Weight          Diabetes:      General: Screening Current      Counseling: Healthy Diet and Healthy Weight          Colorectal Cancer:      General: Screening Current          Prostate Cancer:      General: Screening Current          Osteoporosis:      General: Screening Not Indicated          AAA:      General: Screening Current          Glaucoma:      General: Screening Current          HIV:      General: Screening Not Indicated          Hepatitis C:      General: Risks and Benefits Discussed        Advanced Directives:   Patient has living will for healthcare, has durable POA for healthcare, patient has an advanced directive       Immunizations:      Influenza: Influenza UTD This Year      Pneumococcal: Pneumococcal Due Today      Shingrix: Risks & Benefits Discussed

## 2018-10-17 NOTE — PATIENT INSTRUCTIONS
Obesity   AMBULATORY CARE:   Obesity  is when your body mass index (BMI) is greater than 30  Your healthcare provider will use your height and weight to measure your BMI  The risks of obesity include  many health problems, such as injuries or physical disability  You may need tests to check for the following:  · Diabetes     · High blood pressure or high cholesterol     · Heart disease     · Gallbladder or liver disease     · Cancer of the colon, breast, prostate, liver, or kidney     · Sleep apnea     · Arthritis or gout  Seek care immediately if:   · You have a severe headache, confusion, or difficulty speaking  · You have weakness on one side of your body  · You have chest pain, sweating, or shortness of breath  Contact your healthcare provider if:   · You have symptoms of gallbladder or liver disease, such as pain in your upper abdomen  · You have knee or hip pain and discomfort while walking  · You have symptoms of diabetes, such as intense hunger and thirst, and frequent urination  · You have symptoms of sleep apnea, such as snoring or daytime sleepiness  · You have questions or concerns about your condition or care  Treatment for obesity  focuses on helping you lose weight to improve your health  Even a small decrease in BMI can reduce the risk for many health problems  Your healthcare provider will help you set a weight-loss goal   · Lifestyle changes  are the first step in treating obesity  These include making healthy food choices and getting regular physical activity  Your healthcare provider may suggest a weight-loss program that involves coaching, education, and therapy  · Medicine  may help you lose weight when it is used with a healthy diet and physical activity  · Surgery  can help you lose weight if you are very obese and have other health problems  There are several types of weight-loss surgery  Ask your healthcare provider for more information    Be successful losing weight:   · Set small, realistic goals  An example of a small goal is to walk for 20 minutes 5 days a week  Anther goal is to lose 5% of your body weight  · Tell friends, family members, and coworkers about your goals  and ask for their support  Ask a friend to lose weight with you, or join a weight-loss support group  · Identify foods or triggers that may cause you to overeat , and find ways to avoid them  Remove tempting high-calorie foods from your home and workplace  Place a bowl of fresh fruit on your kitchen counter  If stress causes you to eat, then find other ways to cope with stress  · Keep a diary to track what you eat and drink  Also write down how many minutes of physical activity you do each day  Weigh yourself once a week and record it in your diary  Eating changes: You will need to eat 500 to 1,000 fewer calories each day than you currently eat to lose 1 to 2 pounds a week  The following changes will help you cut calories:  · Eat smaller portions  Use small plates, no larger than 9 inches in diameter  Fill your plate half full of fruits and vegetables  Measure your food using measuring cups until you know what a serving size looks like  · Eat 3 meals and 1 or 2 snacks each day  Plan your meals in advance  Samaria Hamlin and eat at home most of the time  Eat slowly  · Eat fruits and vegetables at every meal   They are low in calories and high in fiber, which makes you feel full  Do not add butter, margarine, or cream sauce to vegetables  Use herbs to season steamed vegetables  · Eat less fat and fewer fried foods  Eat more baked or grilled chicken and fish  These protein sources are lower in calories and fat than red meat  Limit fast food  Dress your salads with olive oil and vinegar instead of bottled dressing  · Limit the amount of sugar you eat  Do not drink sugary beverages  Limit alcohol  Activity changes:  Physical activity is good for your body in many ways   It helps you burn calories and build strong muscles  It decreases stress and depression, and improves your mood  It can also help you sleep better  Talk to your healthcare provider before you begin an exercise program   · Exercise for at least 30 minutes 5 days a week  Start slowly  Set aside time each day for physical activity that you enjoy and that is convenient for you  It is best to do both weight training and an activity that increases your heart rate, such as walking, bicycling, or swimming  · Find ways to be more active  Do yard work and housecleaning  Walk up the stairs instead of using elevators  Spend your leisure time going to events that require walking, such as outdoor festivals or fairs  This extra physical activity can help you lose weight and keep it off  Follow up with your healthcare provider as directed: You may need to meet with a dietitian  Write down your questions so you remember to ask them during your visits  © 2017 [address] [employer] Information is for End User's use only and may not be sold, redistributed or otherwise used for commercial purposes  All illustrations and images included in CareNotes® are the copyrighted property of [employer]  or Jordan Moise  The above information is an  only  It is not intended as medical advice for individual conditions or treatments  Talk to your doctor, nurse or pharmacist before following any medical regimen to see if it is safe and effective for you  Urinary Incontinence   WHAT YOU NEED TO KNOW:   What is urinary incontinence? Urinary incontinence (UI) is when you lose control of your bladder  What causes UI? UI occurs because your bladder cannot store or empty urine properly  The following are the most common types of UI:  · Stress incontinence  is when you leak urine due to increased bladder pressure  This may happen when you cough, sneeze, or exercise       · Urge incontinence  is when you feel the need to urinate right away and leak urine accidentally  · Mixed incontinence  is when you have both stress and urge UI  What are the signs and symptoms of UI?   · You feel like your bladder does not empty completely when you urinate  · You urinate often and need to urinate immediately  · You leak urine when you sleep, or you wake up with the urge to urinate  · You leak urine when you cough, sneeze, exercise, or laugh  How is UI diagnosed? Your healthcare provider will ask how often you leak urine and whether you have stress or urge symptoms  Tell him which medicines you take, how often you urinate, and how much liquid you drink each day  You may need any of the following tests:  · Urine tests  may show infection or kidney function  · A pelvic exam  may be done to check for blockages  A pelvic exam will also show if your bladder, uterus, or other organs have moved out of place  · An x-ray, ultrasound, or CT  may show problems with parts of your urinary system  You may be given contrast liquid to help your organs show up better in the pictures  Tell the healthcare provider if you have ever had an allergic reaction to contrast liquid  Do not enter the MRI room with anything metal  Metal can cause serious injury  Tell the healthcare provider if you have any metal in or on your body  · A bladder scan  will show how much urine is left in your bladder after you urinate  You will be asked to urinate and then healthcare providers will use a small ultrasound machine to check the urine left in your bladder  · Cystometry  is used to check the function of your urinary system  Your healthcare provider checks the pressure in your bladder while filling it with fluid  Your bladder pressure may also be tested when your bladder is full and while you urinate  How is UI treated? · Medicines  can help strengthen your bladder control      · Electrical stimulation  is used to send a small amount of electrical energy to your pelvic floor muscles  This helps control your bladder function  Electrodes may be placed outside your body or in your rectum  For women, the electrodes may be placed in the vagina  · A bulking agent  may be injected into the wall of your urethra to make it thicker  This helps keep your urethra closed and decreases urine leakage  · Devices  such as a clamp, pessary, or tampon may help stop urine leaks  Ask your healthcare provider for more information about these and other devices  · Surgery  may be needed if other treatments do not work  Several types of surgery can help improve your bladder control  Ask your healthcare provider for more information about the surgery you may need  How can I manage my symptoms? · Do pelvic muscle exercises often  Your pelvic muscles help you stop urinating  Squeeze these muscles tight for 5 seconds, then relax for 5 seconds  Gradually work up to squeezing for 10 seconds  Do 3 sets of 15 repetitions a day, or as directed  This will help strengthen your pelvic muscles and improve bladder control  · A catheter  may be used to help empty your bladder  A catheter is a tiny, plastic tube that is put into your bladder to drain your urine  Your healthcare provider may tell you to use a catheter to prevent your bladder from getting too full and leaking urine  · Keep a UI record  Write down how often you leak urine and how much you leak  Make a note of what you were doing when you leaked urine  · Train your bladder  Go to the bathroom at set times, such as every 2 hours, even if you do not feel the urge to go  You can also try to hold your urine when you feel the urge to go  For example, hold your urine for 5 minutes when you feel the urge to go  As that becomes easier, hold your urine for 10 minutes  · Drink liquids as directed  Ask your healthcare provider how much liquid to drink each day and which liquids are best for you   You may need to limit the amount of liquid you drink to help control your urine leakage  Limit or do not have drinks that contain caffeine or alcohol  Do not drink any liquid right before you go to bed  · Prevent constipation  Eat a variety of high-fiber foods  Good examples are high-fiber cereals, beans, vegetables, and whole-grain breads  Prune juice may help make your bowel movement softer  Walking is the best way to trigger your intestines to have a bowel movement  · Exercise regularly and maintain a healthy weight  Ask your healthcare provider how much you should weigh and about the best exercise plan for you  Weight loss and exercise will decrease pressure on your bladder and help you control your leakage  Ask him to help you create a weight loss plan if you are overweight  When should I seek immediate care? · You have severe pain  · You are confused or cannot think clearly  When should I contact my healthcare provider? · You have a fever  · You see blood in your urine  · You have pain when you urinate  · You have new or worse pain, even after treatment  · Your mouth feels dry or you have vision changes  · Your urine is cloudy or smells bad  · You have questions or concerns about your condition or care  CARE AGREEMENT:   You have the right to help plan your care  Learn about your health condition and how it may be treated  Discuss treatment options with your caregivers to decide what care you want to receive  You always have the right to refuse treatment  The above information is an  only  It is not intended as medical advice for individual conditions or treatments  Talk to your doctor, nurse or pharmacist before following any medical regimen to see if it is safe and effective for you  © 2017 [address] Information is for End User's use only and may not be sold, redistributed or otherwise used for commercial purposes   All illustrations and images included in CareNotes® are the copyrighted property of [employer]  or Jordan Moise  Cigarette Smoking and Your Health   AMBULATORY CARE:   Risks to your health if you smoke:  Nicotine and other chemicals found in tobacco damage every cell in your body  Even if you are a light smoker, you have an increased risk for cancer, heart disease, and lung disease  If you are pregnant or have diabetes, smoking increases your risk for complications  Benefits to your health if you stop smoking:   · You decrease respiratory symptoms such as coughing, wheezing, and shortness of breath  · You reduce your risk for cancers of the lung, mouth, throat, kidney, bladder, pancreas, stomach, and cervix  If you already have cancer, you increase the benefits of chemotherapy  You also reduce your risk for cancer returning or a second cancer from developing  · You reduce your risk for heart disease, blood clots, heart attack, and stroke  · You reduce your risk for lung infections, and diseases such as pneumonia, asthma, chronic bronchitis, and emphysema  · Your circulation improves  More oxygen can be delivered to your body  If you have diabetes, you lower your risk for complications, such as kidney, artery, and eye diseases  You also lower your risk for nerve damage  Nerve damage can lead to amputations, poor vision, and blindness  · You improve your body's ability to heal and to fight infections  Benefits to the health of others if you stop smoking:  Tobacco is harmful to nonsmokers who breathe in your secondhand smoke  The following are ways the health of others around you may improve when you stop smoking:  · You lower the risks for lung cancer and heart disease in nonsmoking adults  · If you are pregnant, you lower the risk for miscarriage, early delivery, low birth weight, and stillbirth  You also lower your baby's risk for SIDS, obesity, developmental delay, and neurobehavioral problems, such as ADHD  · If you have children, you lower their risk for ear infections, colds, pneumonia, bronchitis, and asthma  For more information and support to stop smoking:   · Smokefree  gov  Phone: 4- 917 - 426-1736  Web Address: www smokefr[employer]  Follow up with your healthcare provider as directed:  Write down your questions so you remember to ask them during your visits  © 2017 [address] Information is for End User's use only and may not be sold, redistributed or otherwise used for commercial purposes  All illustrations and images included in CareNotes® are the copyrighted property of [employer]  or Jordan Moise  The above information is an  only  It is not intended as medical advice for individual conditions or treatments  Talk to your doctor, nurse or pharmacist before following any medical regimen to see if it is safe and effective for you  Fall Prevention   WHAT YOU NEED TO KNOW:   What is fall prevention? Fall prevention includes ways to make your home and other areas safer  It also includes ways you can move more carefully to prevent a fall  What increases my risk for falls? · Lack of vitamin D    · Not getting enough sleep each night    · Trouble walking or keeping your balance, or foot problems    · Health conditions that cause changes in your blood pressure, vision, or muscle strength and coordination    · Medicines that make you dizzy, weak, or sleepy    · Problems seeing clearly    · Shoes that have high heels or are not supportive    · Tripping hazards, such as items left on the floor, no handrails on the stairs, or broken steps  How can I help protect myself from falls? · Stand or sit up slowly  This may help you keep your balance and prevent falls  If you need to get up during the night, sit up first  Be sure you are fully awake before you stand  Turn on the light before you start walking  Go slowly in case you are still sleepy   Make sure you will not trip over any pets sleeping in the bedroom  · Use assistive devices as directed  Your healthcare provider may suggest that you use a cane or walker to help you keep your balance  You may need to have grab bars put in your bathroom near the toilet or in the shower  · Wear shoes that fit well and have soles that   Wear shoes both inside and outside  Use slippers with good   Do not wear shoes with high heels  · Wear a personal alarm  This is a device that allows you to call 911 if you fall and need help  Ask your healthcare provider for more information  · Stay active  Exercise can help strengthen your muscles and improve your balance  Your healthcare provider may recommend water aerobics or walking  He or she may also recommend physical therapy to improve your coordination  Never start an exercise program without talking to your healthcare provider first      · Manage medical conditions  Keep all appointments with your healthcare providers  Visit your eye doctor as directed  How can I make my home safer? · Add items to prevent falls in the bathroom  Put nonslip strips on your bath or shower floor to prevent you from slipping  Use a bath mat if you do not have carpet in the bathroom  This will prevent you from falling when you step out of the bath or shower  Use a shower seat so you do not need to stand while you shower  Sit on the toilet or a chair in your bathroom to dry yourself and put on clothing  This will prevent you from losing your balance from drying or dressing yourself while you are standing  · Keep paths clear  Remove books, shoes, and other objects from walkways and stairs  Place cords for telephones and lamps out of the way so that you do not need to walk over them  Tape them down if you cannot move them  Remove small rugs  If you cannot remove a rug, secure it with double-sided tape  This will prevent you from tripping  · Install bright lights in your home  Use night lights to help light paths to the bathroom or kitchen  Always turn on the light before you start walking  · Keep items you use often on shelves within reach  Do not use a step stool to help you reach an item  · Paint or place reflective tape on the edges of your stairs  This will help you see the stairs better  Call 911 or have someone else call if:   · You have fallen and are unconscious  · You have fallen and cannot move part of your body  Contact your healthcare provider if:   · You have fallen and have pain or a headache  · You have questions or concerns about your condition or care  CARE AGREEMENT:   You have the right to help plan your care  Learn about your health condition and how it may be treated  Discuss treatment options with your caregivers to decide what care you want to receive  You always have the right to refuse treatment  The above information is an  only  It is not intended as medical advice for individual conditions or treatments  Talk to your doctor, nurse or pharmacist before following any medical regimen to see if it is safe and effective for you  © 2017 [address] [facility] Information is for End User's use only and may not be sold, redistributed or otherwise used for commercial purposes  All illustrations and images included in CareNotes® are the copyrighted property of [employer]  or Jordan Moise  Advance Directives   WHAT YOU NEED TO KNOW:   What are advance directives? Advance directives are legal documents that state your wishes and plans for medical care  These plans are made ahead of time in case you lose your ability to make decisions for yourself  Advance directives can apply to any medical decision, such as the treatments you want, and if you want to donate organs  What are the types of advance directives? There are many types of advance directives, and each state has rules about how to use them   You may choose a combination of any of the following:  · Living will: This is a written record of the treatment you want  You can also choose which treatments you do not want, which to limit, and which to stop at a certain time  This includes surgery, medicine, IV fluid, and tube feedings  · Durable power of  for healthcare [facility]): This is a written record that states who you want to make healthcare choices for you when you are unable to make them for yourself  This person, called a proxy, is usually a family member or a friend  You may choose more than 1 proxy  · Do not resuscitate (DNR) order:  A DNR order is used in case your heart stops beating or you stop breathing  It is a request not to have certain forms of treatment, such as CPR  A DNR order may be included in other types of advance directives  · Medical directive: This covers the care that you want if you are in a coma, near death, or unable to make decisions for yourself  You can list the treatments you want for each condition  Treatment may include pain medicine, surgery, blood transfusions, dialysis, IV or tube feedings, and a ventilator (breathing machine)  · Values history: This document has questions about your views, beliefs, and how you feel and think about life  This information can help others choose the care that you would choose  Why are advance directives important? An advance directive helps you control your care  Although spoken wishes may be used, it is better to have your wishes written down  Spoken wishes can be misunderstood, or not followed  Treatments may be given even if you do not want them  An advance directive may make it easier for your family to make difficult choices about your care  How do I decide what to put in my advance directives? · Make informed decisions:  Make sure you fully understand treatments or care you may receive   Think about the benefits and problems your decisions could cause for you or your family  Talk to healthcare providers if you have concerns or questions before you write down your wishes  You may also want to talk with your [employer] or , or a   Check your state laws to make sure that what you put in your advance directive is legal      · Sign all forms:  Sign and date your advance directive when you have finished  You may also need 2 witnesses to sign the forms  Witnesses cannot be your doctor or his staff, your spouse, heirs or beneficiaries, people you owe money to, or your chosen proxy  Talk to your family, proxy, and healthcare providers about your advance directive  Give each person a copy, and keep one for yourself in a place you can get to easily  Do not keep it hidden or locked away  · Review and revise your plans: You can revise your advance directive at any time, as long as you are able to make decisions  Review your plan every year, and when there are changes in your life, or your health  When you make changes, let your family, proxy, and healthcare providers know  Give each a new copy  Where can I find more information? · [employer]  [address] [place] , [address]  Phone: 5- 485 - [phone]  Phone: 3- 210 - [phone]  Web Address: http://www  aafp org  · [address] [facility]  [address] [facility], [address] [place] [address]  Phone: [phone]  Phone: [phone]  Web Address: Jaspal fletcher  CARE AGREEMENT:   You have the right to help plan your care  To help with this plan, you must learn about your health condition and treatment options  You must also learn about advance directives and how they are used  Work with your healthcare providers to decide what care will be used to treat you  You always have the right to refuse treatment  The above information is an  only   It is not intended as medical advice for individual conditions or treatments  Talk to your doctor, nurse or pharmacist before following any medical regimen to see if it is safe and effective for you  © 2017 [address] Information is for End User's use only and may not be sold, redistributed or otherwise used for commercial purposes  All illustrations and images included in CareNotes® are the copyrighted property of A [employer] M , [employer]  or Jordan Moise

## 2018-10-17 NOTE — PROGRESS NOTES
Assessment/Plan:       Diagnoses and all orders for this visit:    Health care maintenance    Need for vaccination  -     Pneumococcal Polysaccharide Vaccine 23-Valent =>1yo SQ IM    Essential hypertension    Mixed hyperlipidemia    Other orders  -     XIIDRA 5 % op solution; INSTILL 1 DROP INTO BOTH EYES TWICE A DAY PUT 1 DROP IN BOTH EYES TWICE DAILY        Essential hypertension  Controlled, continue quinapril    Mixed hyperlipidemia  Controlled with simvastatin    Continue current medications, we will see him back in 6 months time for recheck of his blood pressure, he is to follow up at the [facility] for his routine medical care  Subjective:      Patient ID: Sofia Barriga is a 70 y o  male  Patient comes in today for checkup  He is taking his quinapril for his hypertension I problems, no compliance issues, no side effects  He is taking simvastatin for his hyperlipidemia, no compliance issues, no side effects  The following portions of the patient's history were reviewed and updated as appropriate:   He has a past medical history of Cardiac disease; Fungal dermatitis; Hyperlipidemia; Hypertension; MRSA (methicillin resistant Staphylococcus aureus) infection; Post traumatic stress disorder (PTSD); and Psychiatric disorder  ,   does not have any pertinent problems on file  ,   has a past surgical history that includes Total knee arthroplasty (Bilateral, 2014); Total hip arthroplasty (Bilateral, 2009, 2010); Hernia repair (N/A, unknown, supraumbilical); CHOLECYSTECTOMY LAPAROSCOPIC (N/A, 3/7/2016); Joint replacement (Bilateral); Cholecystectomy; Colonoscopy; pr colonoscopy flx dx w/collj spec when pfrmd (N/A, 1/9/2018); and Cataract extraction  ,  family history includes Aortic aneurysm in his father; Coronary artery disease in his father, paternal grandfather, and paternal grandmother; Diabetes in his mother; Heart disease in his father  ,   reports that he has quit smoking   His smoking use included Cigarettes  He quit after 40 00 years of use  He has quit using smokeless tobacco  He reports that he does not drink alcohol or use drugs  ,  is allergic to morphine and related     Current Outpatient Prescriptions   Medication Sig Dispense Refill    quinapril (ACCUPRIL) 10 mg tablet Take 1 tablet (10 mg total) by mouth daily 90 tablet 3    simvastatin (ZOCOR) 80 mg tablet Take 80 mg by mouth daily at bedtime   traZODone (DESYREL) 50 mg tablet Take 150 mg by mouth daily at bedtime   XIIDRA 5 % op solution INSTILL 1 DROP INTO BOTH EYES TWICE A DAY PUT 1 DROP IN BOTH EYES TWICE DAILY  6     No current facility-administered medications for this visit  Review of Systems   Constitutional: Negative for chills, fatigue and fever  HENT: Negative for congestion, ear pain, hearing loss, postnasal drip, rhinorrhea and sore throat  Eyes: Negative for pain and visual disturbance  Respiratory: Negative for chest tightness, shortness of breath and wheezing  Cardiovascular: Negative for chest pain and leg swelling  Gastrointestinal: Negative for abdominal distention, abdominal pain, constipation, diarrhea and vomiting  Endocrine: Negative for cold intolerance and heat intolerance  Genitourinary: Negative for difficulty urinating, frequency and urgency  Musculoskeletal: Negative for arthralgias and gait problem  Skin: Negative for color change  Neurological: Negative for dizziness, tremors, syncope, numbness and headaches  Hematological: Negative for adenopathy  Psychiatric/Behavioral: Negative for agitation, confusion and sleep disturbance  The patient is not nervous/anxious  Objective:  Vitals:    10/17/18 1342   BP: 108/66   Pulse: 71   Temp: 98 5 °F (36 9 °C)   SpO2: 97%   Weight: 115 kg (253 lb 6 4 oz)   Height: 6' (1 829 m)     Body mass index is 34 37 kg/m²  Physical Exam   Constitutional: He is oriented to person, place, and time   He appears well-developed and well-nourished  HENT:   Head: Normocephalic  Mouth/Throat: Oropharynx is clear and moist    Eyes: Conjunctivae are normal  No scleral icterus  Neck: Normal range of motion  No thyromegaly present  Cardiovascular: Normal rate, regular rhythm and normal heart sounds  No murmur heard  Pulmonary/Chest: Effort normal and breath sounds normal  No respiratory distress  He has no wheezes  Abdominal: Soft  Bowel sounds are normal  He exhibits no distension  There is no tenderness  Musculoskeletal: Normal range of motion  He exhibits no edema or tenderness  Lymphadenopathy:     He has no cervical adenopathy  Neurological: He is alert and oriented to person, place, and time  No cranial nerve deficit  Skin: Skin is warm and dry  No rash noted  No pallor  Psychiatric: He has a normal mood and affect  His behavior is normal    Nursing note and vitals reviewed

## 2019-01-09 ENCOUNTER — OFFICE VISIT (OUTPATIENT)
Dept: FAMILY MEDICINE CLINIC | Facility: CLINIC | Age: 69
End: 2019-01-09
Payer: MEDICARE

## 2019-01-09 VITALS
HEIGHT: 72 IN | WEIGHT: 262 LBS | HEART RATE: 62 BPM | SYSTOLIC BLOOD PRESSURE: 120 MMHG | DIASTOLIC BLOOD PRESSURE: 72 MMHG | TEMPERATURE: 97.5 F | OXYGEN SATURATION: 97 % | BODY MASS INDEX: 35.49 KG/M2

## 2019-01-09 DIAGNOSIS — R19.7 DIARRHEA, UNSPECIFIED TYPE: Primary | ICD-10-CM

## 2019-01-09 DIAGNOSIS — Z88.9 MULTIPLE ALLERGIES: ICD-10-CM

## 2019-01-09 PROCEDURE — 99214 OFFICE O/P EST MOD 30 MIN: CPT | Performed by: NURSE PRACTITIONER

## 2019-01-09 NOTE — PROGRESS NOTES
Assessment/Plan:    Diarrhea  Stool culture  CBC for infection  CMP for electrolyte imbalance caused by diarrhea  Take immodium  Take fiber pills  Hydrate   Rest  Will call with results         Problem List Items Addressed This Visit     Diarrhea - Primary     Stool culture  CBC for infection  CMP for electrolyte imbalance caused by diarrhea  Take immodium  Take fiber pills  Hydrate   Rest  Will call with results         Relevant Orders    Stool Enteric Bacterial Panel by PCR    Basic metabolic panel    CBC and differential    Multiple allergies            Subjective:      Patient ID: Yadira Shepard is a 70 y o  male  Comes in with complaints of diarrhea since 12/26/2018  Has a lot of gas, diarrhea  Reports this happens once a year and is confused on why  Denies fever and chills  Had stool testing done that were negative, colonoscopy done in 2018 revealed diverticulosis  Denies eating any new type of food  Denies eating at parties or gatherings  Reports his routine has been unchanged  The following portions of the patient's history were reviewed and updated as appropriate: allergies, current medications, past family history, past medical history, past social history, past surgical history and problem list     Review of Systems   Constitutional: Negative  HENT: Negative  Eyes: Negative  Respiratory: Negative  Cardiovascular: Negative  Gastrointestinal: Positive for diarrhea  Endocrine: Negative  Genitourinary: Negative  Musculoskeletal: Negative  Skin: Negative  Allergic/Immunologic: Negative  Neurological: Negative  Hematological: Negative  Psychiatric/Behavioral: Negative  Objective:      /72   Pulse 62   Temp 97 5 °F (36 4 °C)   Ht 6' (1 829 m)   Wt 119 kg (262 lb)   SpO2 97%   BMI 35 53 kg/m²          Physical Exam   Constitutional: He is oriented to person, place, and time  He appears well-developed and well-nourished     HENT:   Head: Normocephalic and atraumatic  Eyes: Pupils are equal, round, and reactive to light  Neck: Normal range of motion  Cardiovascular: Normal rate and regular rhythm  Pulmonary/Chest: Effort normal and breath sounds normal    Abdominal: Bowel sounds are normal  There is tenderness  Musculoskeletal: Normal range of motion  Neurological: He is oriented to person, place, and time  Skin: Skin is warm and dry  Psychiatric: He has a normal mood and affect  His behavior is normal  Judgment and thought content normal    Nursing note and vitals reviewed

## 2019-01-10 ENCOUNTER — APPOINTMENT (OUTPATIENT)
Dept: LAB | Facility: CLINIC | Age: 69
End: 2019-01-10
Payer: MEDICARE

## 2019-01-10 DIAGNOSIS — R19.7 DIARRHEA, UNSPECIFIED TYPE: ICD-10-CM

## 2019-01-10 LAB
ANION GAP SERPL CALCULATED.3IONS-SCNC: 6 MMOL/L (ref 4–13)
BASOPHILS # BLD AUTO: 0.03 THOUSANDS/ΜL (ref 0–0.1)
BASOPHILS NFR BLD AUTO: 1 % (ref 0–1)
BUN SERPL-MCNC: 16 MG/DL (ref 5–25)
CALCIUM SERPL-MCNC: 8.5 MG/DL (ref 8.3–10.1)
CHLORIDE SERPL-SCNC: 107 MMOL/L (ref 100–108)
CO2 SERPL-SCNC: 26 MMOL/L (ref 21–32)
CREAT SERPL-MCNC: 1.08 MG/DL (ref 0.6–1.3)
EOSINOPHIL # BLD AUTO: 0.15 THOUSAND/ΜL (ref 0–0.61)
EOSINOPHIL NFR BLD AUTO: 3 % (ref 0–6)
ERYTHROCYTE [DISTWIDTH] IN BLOOD BY AUTOMATED COUNT: 12.5 % (ref 11.6–15.1)
GFR SERPL CREATININE-BSD FRML MDRD: 70 ML/MIN/1.73SQ M
GLUCOSE P FAST SERPL-MCNC: 97 MG/DL (ref 65–99)
HCT VFR BLD AUTO: 47.6 % (ref 36.5–49.3)
HGB BLD-MCNC: 15.1 G/DL (ref 12–17)
IMM GRANULOCYTES # BLD AUTO: 0.02 THOUSAND/UL (ref 0–0.2)
IMM GRANULOCYTES NFR BLD AUTO: 0 % (ref 0–2)
LYMPHOCYTES # BLD AUTO: 1.93 THOUSANDS/ΜL (ref 0.6–4.47)
LYMPHOCYTES NFR BLD AUTO: 32 % (ref 14–44)
MCH RBC QN AUTO: 30.4 PG (ref 26.8–34.3)
MCHC RBC AUTO-ENTMCNC: 31.7 G/DL (ref 31.4–37.4)
MCV RBC AUTO: 96 FL (ref 82–98)
MONOCYTES # BLD AUTO: 0.7 THOUSAND/ΜL (ref 0.17–1.22)
MONOCYTES NFR BLD AUTO: 12 % (ref 4–12)
NEUTROPHILS # BLD AUTO: 3.26 THOUSANDS/ΜL (ref 1.85–7.62)
NEUTS SEG NFR BLD AUTO: 52 % (ref 43–75)
NRBC BLD AUTO-RTO: 0 /100 WBCS
PLATELET # BLD AUTO: 181 THOUSANDS/UL (ref 149–390)
PMV BLD AUTO: 9.6 FL (ref 8.9–12.7)
POTASSIUM SERPL-SCNC: 3.9 MMOL/L (ref 3.5–5.3)
RBC # BLD AUTO: 4.96 MILLION/UL (ref 3.88–5.62)
SODIUM SERPL-SCNC: 139 MMOL/L (ref 136–145)
WBC # BLD AUTO: 6.09 THOUSAND/UL (ref 4.31–10.16)

## 2019-01-10 PROCEDURE — 85025 COMPLETE CBC W/AUTO DIFF WBC: CPT

## 2019-01-10 PROCEDURE — 80048 BASIC METABOLIC PNL TOTAL CA: CPT

## 2019-01-10 PROCEDURE — 87505 NFCT AGENT DETECTION GI: CPT

## 2019-01-10 PROCEDURE — 36415 COLL VENOUS BLD VENIPUNCTURE: CPT

## 2019-01-10 NOTE — ASSESSMENT & PLAN NOTE
Stool culture  CBC for infection  CMP for electrolyte imbalance caused by diarrhea  Take immodium  Take fiber pills  Hydrate   Rest  Will call with results

## 2019-01-11 ENCOUNTER — TELEPHONE (OUTPATIENT)
Dept: FAMILY MEDICINE CLINIC | Facility: CLINIC | Age: 69
End: 2019-01-11

## 2019-01-11 LAB
CAMPYLOBACTER DNA SPEC NAA+PROBE: NORMAL
SALMONELLA DNA SPEC QL NAA+PROBE: NORMAL
SHIGA TOXIN STX GENE SPEC NAA+PROBE: NORMAL
SHIGELLA DNA SPEC QL NAA+PROBE: NORMAL

## 2019-02-19 ENCOUNTER — APPOINTMENT (OUTPATIENT)
Dept: LAB | Facility: CLINIC | Age: 69
End: 2019-02-19
Payer: MEDICARE

## 2019-02-19 ENCOUNTER — TRANSCRIBE ORDERS (OUTPATIENT)
Dept: ADMINISTRATIVE | Facility: HOSPITAL | Age: 69
End: 2019-02-19

## 2019-02-19 DIAGNOSIS — R19.7 DIARRHEA OF PRESUMED INFECTIOUS ORIGIN: Primary | ICD-10-CM

## 2019-02-19 DIAGNOSIS — R19.7 DIARRHEA OF PRESUMED INFECTIOUS ORIGIN: ICD-10-CM

## 2019-02-19 PROCEDURE — 83516 IMMUNOASSAY NONANTIBODY: CPT

## 2019-02-20 ENCOUNTER — APPOINTMENT (OUTPATIENT)
Dept: LAB | Facility: CLINIC | Age: 69
End: 2019-02-20
Payer: MEDICARE

## 2019-02-20 DIAGNOSIS — R19.7 DIARRHEA OF PRESUMED INFECTIOUS ORIGIN: ICD-10-CM

## 2019-02-20 LAB
TTG IGA SER-ACNC: <2 U/ML (ref 0–3)
TTG IGG SER-ACNC: 4 U/ML (ref 0–5)

## 2019-02-20 PROCEDURE — 83993 ASSAY FOR CALPROTECTIN FECAL: CPT

## 2019-02-20 PROCEDURE — 87329 GIARDIA AG IA: CPT

## 2019-02-22 LAB — G LAMBLIA AG STL QL IA: NEGATIVE

## 2019-02-24 LAB — CALPROTECTIN STL-MCNT: 33 UG/G (ref 0–120)

## 2019-04-17 ENCOUNTER — OFFICE VISIT (OUTPATIENT)
Dept: FAMILY MEDICINE CLINIC | Facility: CLINIC | Age: 69
End: 2019-04-17
Payer: MEDICARE

## 2019-04-17 ENCOUNTER — APPOINTMENT (OUTPATIENT)
Dept: LAB | Facility: CLINIC | Age: 69
End: 2019-04-17
Payer: MEDICARE

## 2019-04-17 ENCOUNTER — TRANSCRIBE ORDERS (OUTPATIENT)
Dept: ADMINISTRATIVE | Facility: HOSPITAL | Age: 69
End: 2019-04-17

## 2019-04-17 VITALS
HEIGHT: 72 IN | BODY MASS INDEX: 35.62 KG/M2 | SYSTOLIC BLOOD PRESSURE: 136 MMHG | OXYGEN SATURATION: 97 % | HEART RATE: 92 BPM | WEIGHT: 263 LBS | TEMPERATURE: 97.7 F | DIASTOLIC BLOOD PRESSURE: 86 MMHG

## 2019-04-17 DIAGNOSIS — L98.9 SKIN LESION OF SCALP: ICD-10-CM

## 2019-04-17 DIAGNOSIS — R19.7 DIARRHEA OF PRESUMED INFECTIOUS ORIGIN: ICD-10-CM

## 2019-04-17 DIAGNOSIS — R73.9 ELEVATED BLOOD SUGAR: ICD-10-CM

## 2019-04-17 DIAGNOSIS — R19.7 DIARRHEA OF PRESUMED INFECTIOUS ORIGIN: Primary | ICD-10-CM

## 2019-04-17 DIAGNOSIS — E78.2 MIXED HYPERLIPIDEMIA: Primary | ICD-10-CM

## 2019-04-17 DIAGNOSIS — Z12.5 SCREENING PSA (PROSTATE SPECIFIC ANTIGEN): ICD-10-CM

## 2019-04-17 DIAGNOSIS — E66.9 OBESITY (BMI 35.0-39.9 WITHOUT COMORBIDITY): ICD-10-CM

## 2019-04-17 DIAGNOSIS — I10 ESSENTIAL HYPERTENSION: ICD-10-CM

## 2019-04-17 PROCEDURE — 87493 C DIFF AMPLIFIED PROBE: CPT

## 2019-04-17 PROCEDURE — 99214 OFFICE O/P EST MOD 30 MIN: CPT | Performed by: FAMILY MEDICINE

## 2019-04-17 RX ORDER — CYCLOSPORINE 0.5 MG/ML
EMULSION OPHTHALMIC
Refills: 3 | COMMUNITY
Start: 2019-03-10

## 2019-04-18 LAB — C DIFF TOX GENS STL QL NAA+PROBE: NORMAL

## 2019-05-28 ENCOUNTER — CONSULT (OUTPATIENT)
Dept: PLASTIC SURGERY | Facility: CLINIC | Age: 69
End: 2019-05-28
Payer: MEDICARE

## 2019-05-28 VITALS — HEIGHT: 72 IN | WEIGHT: 260 LBS | BODY MASS INDEX: 35.21 KG/M2

## 2019-05-28 DIAGNOSIS — L98.9 SKIN LESION OF SCALP: ICD-10-CM

## 2019-05-28 DIAGNOSIS — L82.1 SEBORRHEIC KERATOSIS: Primary | ICD-10-CM

## 2019-05-28 PROCEDURE — 99202 OFFICE O/P NEW SF 15 MIN: CPT | Performed by: PLASTIC SURGERY

## 2019-05-31 DIAGNOSIS — I10 ESSENTIAL HYPERTENSION: ICD-10-CM

## 2019-05-31 RX ORDER — QUINAPRIL 10 MG/1
10 TABLET ORAL DAILY
Qty: 90 TABLET | Refills: 3 | Status: SHIPPED | OUTPATIENT
Start: 2019-05-31 | End: 2020-05-18

## 2019-09-16 ENCOUNTER — OFFICE VISIT (OUTPATIENT)
Dept: FAMILY MEDICINE CLINIC | Facility: CLINIC | Age: 69
End: 2019-09-16
Payer: MEDICARE

## 2019-09-16 VITALS
OXYGEN SATURATION: 95 % | DIASTOLIC BLOOD PRESSURE: 82 MMHG | BODY MASS INDEX: 34.67 KG/M2 | HEIGHT: 72 IN | TEMPERATURE: 98.5 F | RESPIRATION RATE: 16 BRPM | HEART RATE: 73 BPM | SYSTOLIC BLOOD PRESSURE: 120 MMHG | WEIGHT: 256 LBS

## 2019-09-16 DIAGNOSIS — G44.209 TENSION HEADACHE: ICD-10-CM

## 2019-09-16 DIAGNOSIS — M54.2 CERVICALGIA: ICD-10-CM

## 2019-09-16 DIAGNOSIS — I65.22 STENOSIS OF LEFT CAROTID ARTERY: ICD-10-CM

## 2019-09-16 DIAGNOSIS — Z23 NEED FOR INFLUENZA VACCINATION: Primary | ICD-10-CM

## 2019-09-16 PROCEDURE — 90662 IIV NO PRSV INCREASED AG IM: CPT

## 2019-09-16 PROCEDURE — 99213 OFFICE O/P EST LOW 20 MIN: CPT | Performed by: FAMILY MEDICINE

## 2019-09-16 PROCEDURE — G0008 ADMIN INFLUENZA VIRUS VAC: HCPCS

## 2019-09-16 RX ORDER — METHYLPREDNISOLONE 4 MG/1
TABLET ORAL
Qty: 21 EACH | Refills: 0 | Status: SHIPPED | OUTPATIENT
Start: 2019-09-16 | End: 2019-10-25

## 2019-09-16 RX ORDER — CYCLOBENZAPRINE HCL 10 MG
10 TABLET ORAL 3 TIMES DAILY PRN
Qty: 30 TABLET | Refills: 1 | Status: SHIPPED | OUTPATIENT
Start: 2019-09-16 | End: 2019-10-25

## 2019-09-16 RX ORDER — METRONIDAZOLE 250 MG/1
TABLET ORAL
Refills: 9 | COMMUNITY
Start: 2019-08-20 | End: 2019-10-25

## 2019-09-16 NOTE — PROGRESS NOTES
Assessment/Plan:       Diagnoses and all orders for this visit:    Need for influenza vaccination  -     influenza vaccine, 3748-1681, high-dose, PF 0 5 mL (FLUZONE HIGH-DOSE)    Tension headache  -     methylPREDNISolone 4 MG tablet therapy pack; Use as directed on package    Cervicalgia  -     cyclobenzaprine (FLEXERIL) 10 mg tablet; Take 1 tablet (10 mg total) by mouth 3 (three) times a day as needed for muscle spasms    Stenosis of left carotid artery  -     VAS carotid complete study; Future    Other orders  -     metroNIDAZOLE (FLAGYL) 250 mg tablet; TAKE 1 (ONE) TABLET THREE TIMES DAILY        Cervicalgia  Chiropractic treatment recommended        Subjective:      Patient ID: Stevie Kong is a 78 y o  male  Patient comes in with an one-week history of right temporal and frontal headaches  Gets relief with aspirin over-the-counter  He does have a sore neck on the right side  Concerned regarding history of carotid artery stenosis  The following portions of the patient's history were reviewed and updated as appropriate:   He has a past medical history of Cardiac disease, Fungal dermatitis, Hyperlipidemia, Hypertension, MRSA (methicillin resistant Staphylococcus aureus) infection, Post traumatic stress disorder (PTSD), and Psychiatric disorder  ,  does not have any pertinent problems on file  ,   has a past surgical history that includes Total knee arthroplasty (Bilateral, 2014); Total hip arthroplasty (Bilateral, 2009, 2010); Hernia repair (N/A, unknown, supraumbilical); CHOLECYSTECTOMY LAPAROSCOPIC (N/A, 3/7/2016); Joint replacement (Bilateral); Cholecystectomy; Colonoscopy; pr colonoscopy flx dx w/collj spec when pfrmd (N/A, 1/9/2018); and Cataract extraction  ,  family history includes Aortic aneurysm in his father; Coronary artery disease in his father, paternal grandfather, and paternal grandmother; Diabetes in his mother; Heart disease in his father  ,   reports that he has quit smoking   His smoking use included cigarettes  He quit after 40 00 years of use  He has quit using smokeless tobacco  He reports that he does not drink alcohol or use drugs  ,  is allergic to morphine and related     Current Outpatient Medications   Medication Sig Dispense Refill    cyclobenzaprine (FLEXERIL) 10 mg tablet Take 1 tablet (10 mg total) by mouth 3 (three) times a day as needed for muscle spasms 30 tablet 1    methylPREDNISolone 4 MG tablet therapy pack Use as directed on package 21 each 0    metroNIDAZOLE (FLAGYL) 250 mg tablet TAKE 1 (ONE) TABLET THREE TIMES DAILY  9    quinapril (ACCUPRIL) 10 mg tablet TAKE 1 TABLET (10 MG TOTAL) BY MOUTH DAILY 90 tablet 3    RESTASIS 0 05 % ophthalmic emulsion INSTILL 1 DROP INTO BOTH EYES TWICE A DAY  3    simvastatin (ZOCOR) 80 mg tablet Take 80 mg by mouth daily at bedtime   traZODone (DESYREL) 50 mg tablet Take 150 mg by mouth daily at bedtime   XIIDRA 5 % op solution INSTILL 1 DROP INTO BOTH EYES TWICE A DAY PUT 1 DROP IN BOTH EYES TWICE DAILY  6     No current facility-administered medications for this visit  Review of Systems   Constitutional: Negative  HENT: Negative  Respiratory: Negative  Cardiovascular: Negative  Neurological: Positive for headaches  Objective:  Vitals:    09/16/19 0954   BP: 120/82   Pulse: 73   Resp: 16   Temp: 98 5 °F (36 9 °C)   SpO2: 95%   Weight: 116 kg (256 lb)   Height: 6' (1 829 m)     Body mass index is 34 72 kg/m²  Physical Exam   Constitutional: He appears well-developed and well-nourished  HENT:   Head: Normocephalic and atraumatic  Right Ear: External ear normal    Left Ear: External ear normal    Nose: Nose normal    Mouth/Throat: Oropharynx is clear and moist    Eyes: Pupils are equal, round, and reactive to light  Conjunctivae and EOM are normal    Neck: Normal range of motion  Cardiovascular: Normal rate and regular rhythm     Pulmonary/Chest: Effort normal and breath sounds normal  Musculoskeletal:   Tender right sided cervical paraspinal muscles and trapezius muscle

## 2019-10-10 ENCOUNTER — APPOINTMENT (OUTPATIENT)
Dept: LAB | Facility: CLINIC | Age: 69
End: 2019-10-10
Payer: MEDICARE

## 2019-10-10 DIAGNOSIS — Z12.5 SCREENING PSA (PROSTATE SPECIFIC ANTIGEN): ICD-10-CM

## 2019-10-10 DIAGNOSIS — I10 ESSENTIAL HYPERTENSION: ICD-10-CM

## 2019-10-10 DIAGNOSIS — E78.2 MIXED HYPERLIPIDEMIA: ICD-10-CM

## 2019-10-10 LAB
ALBUMIN SERPL BCP-MCNC: 3.7 G/DL (ref 3.5–5)
ALP SERPL-CCNC: 38 U/L (ref 46–116)
ALT SERPL W P-5'-P-CCNC: 30 U/L (ref 12–78)
ANION GAP SERPL CALCULATED.3IONS-SCNC: 6 MMOL/L (ref 4–13)
AST SERPL W P-5'-P-CCNC: 16 U/L (ref 5–45)
BILIRUB SERPL-MCNC: 1.19 MG/DL (ref 0.2–1)
BUN SERPL-MCNC: 19 MG/DL (ref 5–25)
CALCIUM SERPL-MCNC: 8.9 MG/DL (ref 8.3–10.1)
CHLORIDE SERPL-SCNC: 110 MMOL/L (ref 100–108)
CHOLEST SERPL-MCNC: 144 MG/DL (ref 50–200)
CO2 SERPL-SCNC: 26 MMOL/L (ref 21–32)
CREAT SERPL-MCNC: 1.03 MG/DL (ref 0.6–1.3)
ERYTHROCYTE [DISTWIDTH] IN BLOOD BY AUTOMATED COUNT: 12.7 % (ref 11.6–15.1)
GFR SERPL CREATININE-BSD FRML MDRD: 73 ML/MIN/1.73SQ M
GLUCOSE SERPL-MCNC: 101 MG/DL (ref 65–140)
HCT VFR BLD AUTO: 46.2 % (ref 36.5–49.3)
HDLC SERPL-MCNC: 51 MG/DL (ref 40–60)
HGB BLD-MCNC: 14.7 G/DL (ref 12–17)
LDLC SERPL CALC-MCNC: 77 MG/DL (ref 0–100)
MCH RBC QN AUTO: 31.1 PG (ref 26.8–34.3)
MCHC RBC AUTO-ENTMCNC: 31.8 G/DL (ref 31.4–37.4)
MCV RBC AUTO: 98 FL (ref 82–98)
NONHDLC SERPL-MCNC: 93 MG/DL
PLATELET # BLD AUTO: 174 THOUSANDS/UL (ref 149–390)
PMV BLD AUTO: 10 FL (ref 8.9–12.7)
POTASSIUM SERPL-SCNC: 3.9 MMOL/L (ref 3.5–5.3)
PROT SERPL-MCNC: 6.9 G/DL (ref 6.4–8.2)
PSA SERPL-MCNC: 1.1 NG/ML (ref 0–4)
RBC # BLD AUTO: 4.73 MILLION/UL (ref 3.88–5.62)
SODIUM SERPL-SCNC: 142 MMOL/L (ref 136–145)
TRIGL SERPL-MCNC: 80 MG/DL
WBC # BLD AUTO: 4.55 THOUSAND/UL (ref 4.31–10.16)

## 2019-10-10 PROCEDURE — 36415 COLL VENOUS BLD VENIPUNCTURE: CPT

## 2019-10-10 PROCEDURE — 85027 COMPLETE CBC AUTOMATED: CPT

## 2019-10-10 PROCEDURE — G0103 PSA SCREENING: HCPCS

## 2019-10-10 PROCEDURE — 80053 COMPREHEN METABOLIC PANEL: CPT

## 2019-10-10 PROCEDURE — 80061 LIPID PANEL: CPT

## 2019-10-22 ENCOUNTER — HOSPITAL ENCOUNTER (OUTPATIENT)
Dept: ULTRASOUND IMAGING | Facility: HOSPITAL | Age: 69
Discharge: HOME | End: 2019-10-22
Attending: FAMILY MEDICINE
Payer: MEDICARE

## 2019-10-22 DIAGNOSIS — I65.22 STENOSIS OF LEFT CAROTID ARTERY: ICD-10-CM

## 2019-10-22 PROCEDURE — 93880 EXTRACRANIAL BILAT STUDY: CPT | Performed by: SURGERY

## 2019-10-22 PROCEDURE — 93880 EXTRACRANIAL BILAT STUDY: CPT

## 2019-10-25 ENCOUNTER — OFFICE VISIT (OUTPATIENT)
Dept: FAMILY MEDICINE CLINIC | Facility: CLINIC | Age: 69
End: 2019-10-25
Payer: MEDICARE

## 2019-10-25 VITALS
SYSTOLIC BLOOD PRESSURE: 118 MMHG | HEART RATE: 68 BPM | BODY MASS INDEX: 35.49 KG/M2 | TEMPERATURE: 96.5 F | DIASTOLIC BLOOD PRESSURE: 84 MMHG | HEIGHT: 72 IN | WEIGHT: 262 LBS | OXYGEN SATURATION: 96 %

## 2019-10-25 DIAGNOSIS — I10 ESSENTIAL HYPERTENSION: ICD-10-CM

## 2019-10-25 DIAGNOSIS — Z00.00 HEALTH CARE MAINTENANCE: Primary | ICD-10-CM

## 2019-10-25 DIAGNOSIS — E78.2 MIXED HYPERLIPIDEMIA: ICD-10-CM

## 2019-10-25 DIAGNOSIS — Z23 NEED FOR VACCINATION WITH 13-POLYVALENT PNEUMOCOCCAL CONJUGATE VACCINE: ICD-10-CM

## 2019-10-25 PROCEDURE — 90670 PCV13 VACCINE IM: CPT | Performed by: FAMILY MEDICINE

## 2019-10-25 PROCEDURE — G0009 ADMIN PNEUMOCOCCAL VACCINE: HCPCS | Performed by: FAMILY MEDICINE

## 2019-10-25 PROCEDURE — 99213 OFFICE O/P EST LOW 20 MIN: CPT | Performed by: FAMILY MEDICINE

## 2019-10-25 PROCEDURE — G0438 PPPS, INITIAL VISIT: HCPCS | Performed by: FAMILY MEDICINE

## 2019-10-25 NOTE — PATIENT INSTRUCTIONS

## 2019-10-25 NOTE — PROGRESS NOTES
Assessment and Plan:     Problem List Items Addressed This Visit        Cardiovascular and Mediastinum    Essential hypertension       Other    Mixed hyperlipidemia      Other Visit Diagnoses     Health care maintenance    -  Primary    Need for vaccination with 13-polyvalent pneumococcal conjugate vaccine        Relevant Orders    PNEUMOCOCCAL CONJUGATE VACCINE 13-VALENT GREATER THAN 6 MONTHS (Completed)           Preventive health issues were discussed with patient, and age appropriate screening tests were ordered as noted in patient's After Visit Summary  Personalized health advice and appropriate referrals for health education or preventive services given if needed, as noted in patient's After Visit Summary  History of Present Illness:     Patient presents for Medicare Annual Wellness visit    Patient Care Team:  Huyen Pinon DO as PCP - Christiano South MD as Endoscopist     Problem List:     Patient Active Problem List   Diagnosis    Post traumatic stress disorder    Mixed hyperlipidemia    Cardiac disease    Essential hypertension    Elevated blood sugar    Diarrhea    Multiple allergies    Seborrheic keratosis    Tension headache    Cervicalgia    Stenosis of left carotid artery      Past Medical and Surgical History:     Past Medical History:   Diagnosis Date    Cardiac disease     Fungal dermatitis     Hyperlipidemia     Hypertension     per [employer]: Essential hypertension ith goal blood pressure less than 130/85;  Resolved:4/20/17    MRSA (methicillin resistant Staphylococcus aureus) infection     Post traumatic stress disorder (PTSD)     Psychiatric disorder      Past Surgical History:   Procedure Laterality Date    CATARACT EXTRACTION      CHOLECYSTECTOMY      CHOLECYSTECTOMY LAPAROSCOPIC N/A 3/7/2016    Procedure: CHOLECYSTECTOMY LAPAROSCOPIC;  Surgeon: Tonie Gómez DO;  [facility];  Service:     COLONOSCOPY      HERNIA REPAIR N/A unknown, supraumbilical    JOINT REPLACEMENT Bilateral     hips and knees    FL COLONOSCOPY FLX DX W/COLLJ SPEC WHEN PFRMD N/A 1/9/2018    Procedure: COLONOSCOPY;  Surgeon: Dustin Lr MD;  Location: [facility];   Service: Gastroenterology    TOTAL HIP ARTHROPLASTY Bilateral 2009, 2010    TOTAL KNEE ARTHROPLASTY Bilateral 2014      Family History:     Family History   Problem Relation Age of Onset    Diabetes Mother     Heart disease Father     Coronary artery disease Father     Aortic aneurysm Father         Ruptured abdominal aortic aneurysm    Coronary artery disease Paternal Grandmother     Coronary artery disease Paternal Grandfather       Social History:     Social History     Socioeconomic History    Marital status: /Civil Union     Spouse name: None    Number of children: None    Years of education: None    Highest education level: None   Occupational History    Occupation: Retired   Social Needs    Financial resource strain: None    Food insecurity:     Worry: None     Inability: None    Transportation needs:     Medical: None     Non-medical: None   Tobacco Use    Smoking status: Former Smoker     Years: 40 00     Types: Cigarettes    Smokeless tobacco: Former User   Substance and Sexual Activity    Alcohol use: No    Drug use: No    Sexual activity: None   Lifestyle    Physical activity:     Days per week: None     Minutes per session: None    Stress: None   Relationships    Social connections:     Talks on phone: None     Gets together: None     Attends [employer] service: None     Active member of club or organization: None     Attends meetings of clubs or organizations: None     Relationship status: None    Intimate partner violence:     Fear of current or ex partner: None     Emotionally abused: None     Physically abused: None     Forced sexual activity: None   Other Topics Concern    None   Social History Narrative    Daily caffeine consumption       Medications and Allergies:     Current Outpatient Medications   Medication Sig Dispense Refill    quinapril (ACCUPRIL) 10 mg tablet TAKE 1 TABLET (10 MG TOTAL) BY MOUTH DAILY 90 tablet 3    RESTASIS 0 05 % ophthalmic emulsion INSTILL 1 DROP INTO BOTH EYES TWICE A DAY  3    simvastatin (ZOCOR) 80 mg tablet Take 80 mg by mouth daily at bedtime   traZODone (DESYREL) 50 mg tablet Take 150 mg by mouth daily at bedtime   XIIDRA 5 % op solution INSTILL 1 DROP INTO BOTH EYES TWICE A DAY PUT 1 DROP IN BOTH EYES TWICE DAILY  6     No current facility-administered medications for this visit  Allergies   Allergen Reactions    Morphine And Related GI Intolerance      Immunizations:     Immunization History   Administered Date(s) Administered    INFLUENZA 03/27/2014, 10/15/2018    Influenza Quadrivalent Preservative Free 3 years and older IM 10/04/2016    Influenza Split High Dose Preservative Free IM 10/04/2017    Influenza, high dose seasonal 0 5 mL 09/16/2019    Pneumococcal Conjugate 13-Valent 10/25/2019    Pneumococcal Polysaccharide PPV23 10/17/2018      Health Maintenance:         Topic Date Due    CRC Screening: Colonoscopy  01/09/2023    Hepatitis C Screening  Discontinued     There are no preventive care reminders to display for this patient  Medicare Health Risk Assessment:     /84   Pulse 68   Temp (!) 96 5 °F (35 8 °C)   Ht 6' (1 829 m)   Wt 119 kg (262 lb)   SpO2 96%   BMI 35 53 kg/m²      Donnie Mcneal is here for his Subsequent Wellness visit  Last Medicare Wellness visit information reviewed, patient interviewed and updates made to the record today  Health Risk Assessment:   Patient rates overall health as good  Patient feels that their physical health rating is same  Eyesight was rated as same  Hearing was rated as same  Patient feels that their emotional and mental health rating is same  Pain experienced in the last 7 days has been a lot  Patient's pain rating has been 9/10   Patient states that he has experienced no weight loss or gain in last 6 months  Depression Screening:   PHQ-2 Score: 0      Fall Risk Screening: In the past year, patient has experienced: no history of falling in past year      Home Safety:  Patient does not have trouble with stairs inside or outside of their home  Patient has working smoke alarms and has working carbon monoxide detector  Home safety hazards include: none  Nutrition:   Current diet is Regular  Medications:   Patient is not currently taking any over-the-counter supplements  Patient is able to manage medications  Activities of Daily Living (ADLs)/Instrumental Activities of Daily Living (IADLs):   Walk and transfer into and out of bed and chair?: Yes  Dress and groom yourself?: Yes    Bathe or shower yourself?: Yes    Feed yourself?  Yes  Do your laundry/housekeeping?: Yes  Manage your money, pay your bills and track your expenses?: Yes  Make your own meals?: Yes    Do your own shopping?: Yes    Previous Hospitalizations:   Any hospitalizations or ED visits within the last 12 months?: No      Advance Care Planning:   Living will: No    Durable POA for healthcare: No    Advanced directive: No    Advanced directive counseling given: Yes      Cognitive Screening:   Provider or family/friend/caregiver concerned regarding cognition?: No    PREVENTIVE SCREENINGS      Cardiovascular Screening:    General: Screening Not Indicated and History Lipid Disorder      Diabetes Screening:     General: Screening Current      Colorectal Cancer Screening:     General: Screening Current      Prostate Cancer Screening:    General: Screening Current      Osteoporosis Screening:    General: Screening Not Indicated      Abdominal Aortic Aneurysm (AAA) Screening:    Risk factors include: age between 73-67 yo and tobacco use        Lung Cancer Screening:     General: Risks and Benefits Discussed      Hepatitis C Screening:    General: Risks and Benefits Discussed    Other Counseling Topics:   Car/seat belt/driving safety and sunscreen         Mendane Aguillon, DO

## 2019-10-25 NOTE — PROGRESS NOTES
Assessment/Plan:       Diagnoses and all orders for this visit:    Health care maintenance    Need for vaccination with 13-polyvalent pneumococcal conjugate vaccine  -     PNEUMOCOCCAL CONJUGATE VACCINE 13-VALENT GREATER THAN 6 MONTHS    Mixed hyperlipidemia    Essential hypertension        Essential hypertension  Well controlled, continue his quinapril    Mixed hyperlipidemia  Well controlled, continue simvastatin  He gets this through the [place]    Follow-up in 6 months    Subjective:      Patient ID: Christian Mahoney is a 78 y o  male  Patient comes in today for checkup on his hypertension hyperlipidemia  He continues to take his benazepril and his simvastatin as prescribed, no compliance issues or side effects  He did get his blood work done in this reviewed with him today  He does not check his blood pressure home  He is also under the care the [place]  The following portions of the patient's history were reviewed and updated as appropriate:   He has a past medical history of Cardiac disease, Fungal dermatitis, Hyperlipidemia, Hypertension, MRSA (methicillin resistant Staphylococcus aureus) infection, Post traumatic stress disorder (PTSD), and Psychiatric disorder  ,  does not have any pertinent problems on file  ,   has a past surgical history that includes Total knee arthroplasty (Bilateral, 2014); Total hip arthroplasty (Bilateral, 2009, 2010); Hernia repair (N/A, unknown, supraumbilical); CHOLECYSTECTOMY LAPAROSCOPIC (N/A, 3/7/2016); Joint replacement (Bilateral); Cholecystectomy; Colonoscopy; pr colonoscopy flx dx w/collj spec when pfrmd (N/A, 1/9/2018); and Cataract extraction  ,  family history includes Aortic aneurysm in his father; Coronary artery disease in his father, paternal grandfather, and paternal grandmother; Diabetes in his mother; Heart disease in his father  ,   reports that he has quit smoking  His smoking use included cigarettes  He quit after 40 00 years of use   He has quit using smokeless tobacco  He reports that he does not drink alcohol or use drugs  ,  is allergic to morphine and related     Current Outpatient Medications   Medication Sig Dispense Refill    quinapril (ACCUPRIL) 10 mg tablet TAKE 1 TABLET (10 MG TOTAL) BY MOUTH DAILY 90 tablet 3    RESTASIS 0 05 % ophthalmic emulsion INSTILL 1 DROP INTO BOTH EYES TWICE A DAY  3    simvastatin (ZOCOR) 80 mg tablet Take 80 mg by mouth daily at bedtime   traZODone (DESYREL) 50 mg tablet Take 150 mg by mouth daily at bedtime   XIIDRA 5 % op solution INSTILL 1 DROP INTO BOTH EYES TWICE A DAY PUT 1 DROP IN BOTH EYES TWICE DAILY  6     No current facility-administered medications for this visit  Review of Systems   Constitutional: Negative for chills, fatigue and fever  HENT: Negative for congestion, ear pain, hearing loss, postnasal drip, rhinorrhea and sore throat  Eyes: Negative for pain and visual disturbance  Respiratory: Negative for chest tightness, shortness of breath and wheezing  Cardiovascular: Negative for chest pain and leg swelling  Gastrointestinal: Negative for abdominal distention, abdominal pain, constipation, diarrhea and vomiting  Endocrine: Negative for cold intolerance and heat intolerance  Genitourinary: Negative for difficulty urinating, frequency and urgency  Musculoskeletal: Negative for arthralgias and gait problem  Skin: Negative for color change  Neurological: Negative for dizziness, tremors, syncope, numbness and headaches  Hematological: Negative for adenopathy  Psychiatric/Behavioral: Negative for agitation, confusion and sleep disturbance  The patient is not nervous/anxious  Objective:  Vitals:    10/25/19 0848   BP: 118/84   Pulse: 68   Temp: (!) 96 5 °F (35 8 °C)   SpO2: 96%   Weight: 119 kg (262 lb)   Height: 6' (1 829 m)     Body mass index is 35 53 kg/m²  Physical Exam   Constitutional: He is oriented to person, place, and time   He appears well-developed and well-nourished  HENT:   Head: Normocephalic  Mouth/Throat: Oropharynx is clear and moist    Eyes: Conjunctivae are normal  No scleral icterus  Neck: Normal range of motion  No thyromegaly present  Cardiovascular: Normal rate, regular rhythm and normal heart sounds  No murmur heard  Pulmonary/Chest: Effort normal and breath sounds normal  No respiratory distress  He has no wheezes  Abdominal: Soft  Bowel sounds are normal  He exhibits no distension  There is no tenderness  Musculoskeletal: Normal range of motion  He exhibits no edema or tenderness  Lymphadenopathy:     He has no cervical adenopathy  Neurological: He is alert and oriented to person, place, and time  No cranial nerve deficit  Skin: Skin is warm and dry  No rash noted  No pallor  Psychiatric: He has a normal mood and affect  His behavior is normal    Nursing note and vitals reviewed

## 2020-01-14 ENCOUNTER — APPOINTMENT (OUTPATIENT)
Dept: LAB | Facility: CLINIC | Age: 70
End: 2020-01-14
Payer: MEDICARE

## 2020-01-14 ENCOUNTER — OFFICE VISIT (OUTPATIENT)
Dept: FAMILY MEDICINE CLINIC | Facility: CLINIC | Age: 70
End: 2020-01-14
Payer: MEDICARE

## 2020-01-14 VITALS
DIASTOLIC BLOOD PRESSURE: 74 MMHG | BODY MASS INDEX: 35.89 KG/M2 | WEIGHT: 265 LBS | HEIGHT: 72 IN | SYSTOLIC BLOOD PRESSURE: 122 MMHG | HEART RATE: 71 BPM | OXYGEN SATURATION: 95 %

## 2020-01-14 DIAGNOSIS — R07.89 OTHER CHEST PAIN: Primary | ICD-10-CM

## 2020-01-14 DIAGNOSIS — R07.89 OTHER CHEST PAIN: ICD-10-CM

## 2020-01-14 DIAGNOSIS — I10 ESSENTIAL HYPERTENSION: ICD-10-CM

## 2020-01-14 DIAGNOSIS — E78.2 MIXED HYPERLIPIDEMIA: ICD-10-CM

## 2020-01-14 LAB
ALBUMIN SERPL BCP-MCNC: 3.7 G/DL (ref 3.5–5)
ALP SERPL-CCNC: 41 U/L (ref 46–116)
ALT SERPL W P-5'-P-CCNC: 35 U/L (ref 12–78)
ANION GAP SERPL CALCULATED.3IONS-SCNC: 4 MMOL/L (ref 4–13)
AST SERPL W P-5'-P-CCNC: 14 U/L (ref 5–45)
BASOPHILS # BLD AUTO: 0.02 THOUSANDS/ΜL (ref 0–0.1)
BASOPHILS NFR BLD AUTO: 0 % (ref 0–1)
BILIRUB SERPL-MCNC: 0.68 MG/DL (ref 0.2–1)
BUN SERPL-MCNC: 17 MG/DL (ref 5–25)
CALCIUM SERPL-MCNC: 9.1 MG/DL (ref 8.3–10.1)
CHLORIDE SERPL-SCNC: 111 MMOL/L (ref 100–108)
CHOLEST SERPL-MCNC: 147 MG/DL (ref 50–200)
CO2 SERPL-SCNC: 29 MMOL/L (ref 21–32)
CREAT SERPL-MCNC: 1.08 MG/DL (ref 0.6–1.3)
EOSINOPHIL # BLD AUTO: 0.08 THOUSAND/ΜL (ref 0–0.61)
EOSINOPHIL NFR BLD AUTO: 1 % (ref 0–6)
ERYTHROCYTE [DISTWIDTH] IN BLOOD BY AUTOMATED COUNT: 12.5 % (ref 11.6–15.1)
GFR SERPL CREATININE-BSD FRML MDRD: 69 ML/MIN/1.73SQ M
GLUCOSE SERPL-MCNC: 111 MG/DL (ref 65–140)
HCT VFR BLD AUTO: 47.1 % (ref 36.5–49.3)
HDLC SERPL-MCNC: 48 MG/DL
HGB BLD-MCNC: 14.8 G/DL (ref 12–17)
IMM GRANULOCYTES # BLD AUTO: 0.02 THOUSAND/UL (ref 0–0.2)
IMM GRANULOCYTES NFR BLD AUTO: 0 % (ref 0–2)
LDLC SERPL CALC-MCNC: 80 MG/DL (ref 0–100)
LYMPHOCYTES # BLD AUTO: 1.42 THOUSANDS/ΜL (ref 0.6–4.47)
LYMPHOCYTES NFR BLD AUTO: 26 % (ref 14–44)
MCH RBC QN AUTO: 30.5 PG (ref 26.8–34.3)
MCHC RBC AUTO-ENTMCNC: 31.4 G/DL (ref 31.4–37.4)
MCV RBC AUTO: 97 FL (ref 82–98)
MONOCYTES # BLD AUTO: 0.54 THOUSAND/ΜL (ref 0.17–1.22)
MONOCYTES NFR BLD AUTO: 10 % (ref 4–12)
NEUTROPHILS # BLD AUTO: 3.49 THOUSANDS/ΜL (ref 1.85–7.62)
NEUTS SEG NFR BLD AUTO: 63 % (ref 43–75)
NONHDLC SERPL-MCNC: 99 MG/DL
NRBC BLD AUTO-RTO: 0 /100 WBCS
PLATELET # BLD AUTO: 173 THOUSANDS/UL (ref 149–390)
PMV BLD AUTO: 9.7 FL (ref 8.9–12.7)
POTASSIUM SERPL-SCNC: 4.2 MMOL/L (ref 3.5–5.3)
PROT SERPL-MCNC: 7 G/DL (ref 6.4–8.2)
RBC # BLD AUTO: 4.85 MILLION/UL (ref 3.88–5.62)
SODIUM SERPL-SCNC: 144 MMOL/L (ref 136–145)
TRIGL SERPL-MCNC: 93 MG/DL
TROPONIN I SERPL-MCNC: <0.02 NG/ML
WBC # BLD AUTO: 5.57 THOUSAND/UL (ref 4.31–10.16)

## 2020-01-14 PROCEDURE — 80061 LIPID PANEL: CPT

## 2020-01-14 PROCEDURE — 84484 ASSAY OF TROPONIN QUANT: CPT

## 2020-01-14 PROCEDURE — 85025 COMPLETE CBC W/AUTO DIFF WBC: CPT

## 2020-01-14 PROCEDURE — 36415 COLL VENOUS BLD VENIPUNCTURE: CPT

## 2020-01-14 PROCEDURE — 80053 COMPREHEN METABOLIC PANEL: CPT

## 2020-01-14 PROCEDURE — 99214 OFFICE O/P EST MOD 30 MIN: CPT | Performed by: FAMILY MEDICINE

## 2020-01-14 RX ORDER — METRONIDAZOLE 250 MG/1
TABLET ORAL
COMMUNITY
Start: 2019-10-27 | End: 2020-02-05 | Stop reason: HOSPADM

## 2020-01-14 NOTE — PROGRESS NOTES
BMI Counseling: Body mass index is 35 94 kg/m²  The BMI is above normal  Nutrition recommendations include decreasing portion sizes, decreasing fast food intake, limiting drinks that contain sugar, moderation in carbohydrate intake and reducing intake of saturated and trans fat  Exercise recommendations include moderate physical activity 150 minutes/week  No pharmacotherapy was ordered  Assessment/Plan:     Chronic Problems:  No problem-specific Assessment & Plan notes found for this encounter  Visit Diagnosis:  Diagnoses and all orders for this visit:    Other chest pain  -     Troponin I; Future  -     Comprehensive metabolic panel; Future  -     CBC and differential; Future  -     Lipid panel; Future  -     Ambulatory referral to Cardiology; Future    Essential hypertension    Mixed hyperlipidemia    Other orders  -     metroNIDAZOLE (FLAGYL) 250 mg tablet     chest pain   discussed plan care with patient, 1st recommendation evaluation with emergency room staff refused by patient, discussed  Causative factors, in that symptoms are reproducible with palpation, not reproducible with exercise or activity and nonradiating, consider musculoskeletal /GI, that being said recommend asa ,evaluation with Cardiology   advise any changes is to immediately go to emergency room  htn   stable, to continue current medications   hyperlipidemia   to continue current medications        Subjective:    Patient ID: Madhav Lancaster is a 78 y o  male      Sensation of a lump in the chest specific area    did not think necessary to go to emergency room for evaluation  Right a the spot of the chest and abd , breast bone ,Able to find a specific spot upper abdomen feels like a lump sensation, denies dysphagia, non radiating , neg n/v, appetite unchanged  Has been ging on for the past 2+ wks   Does not increase with any exercise /activity   Not relieved with any maneuver   Neg palpations , sob , crissy , exercise intolerance   htn , chol , no missing doses   Quit smoking 15 yrs ago     Going on vacation tomorrow, wife told me to come in   [facility]             The following portions of the patient's history were reviewed and updated as appropriate: allergies, current medications, past family history, past medical history, past social history, past surgical history and problem list     Review of Systems   Constitutional: Negative for appetite change, chills, fever and unexpected weight change  HENT: Negative for congestion, dental problem, ear pain, hearing loss, postnasal drip, rhinorrhea, sinus pressure, sinus pain, sneezing, sore throat, tinnitus and voice change  Eyes: Negative for visual disturbance  Respiratory: Negative for apnea, cough, chest tightness and shortness of breath  Cardiovascular: Positive for chest pain  Negative for palpitations and leg swelling  Gastrointestinal: Negative for abdominal pain, blood in stool, constipation, diarrhea, nausea and vomiting  Endocrine: Negative for cold intolerance, heat intolerance, polydipsia, polyphagia and polyuria  Genitourinary: Negative for decreased urine volume, difficulty urinating, dysuria, frequency and hematuria  Musculoskeletal: Negative for arthralgias, back pain, gait problem, joint swelling and myalgias  Skin: Negative for color change, rash and wound  Allergic/Immunologic: Negative for environmental allergies and food allergies  Neurological: Negative for dizziness, syncope, weakness, light-headedness, numbness and headaches  Hematological: Negative for adenopathy  Does not bruise/bleed easily  Psychiatric/Behavioral: Negative for sleep disturbance and suicidal ideas  The patient is not nervous/anxious            /74   Pulse 71   Ht 6' (1 829 m)   Wt 120 kg (265 lb)   SpO2 95%   BMI 35 94 kg/m²   Social History     Socioeconomic History    Marital status: /Civil Union     Spouse name: Not on file    Number of children: Not on file    Years of education: Not on file    Highest education level: Not on file   Occupational History    Occupation: Retired   Social Needs    Financial resource strain: Not on file    Food insecurity:     Worry: Not on file     Inability: Not on file   [employer] needs:     Medical: Not on file     Non-medical: Not on file   Tobacco Use    Smoking status: Former Smoker     Years: 40 00     Types: Cigarettes    Smokeless tobacco: Former User   Substance and Sexual Activity    Alcohol use: No    Drug use: No    Sexual activity: Not on file   Lifestyle    Physical activity:     Days per week: Not on file     Minutes per session: Not on file    Stress: Not on file   Relationships    Social connections:     Talks on phone: Not on file     Gets together: Not on file     Attends Latter-day service: Not on file     Active member of club or organization: Not on file     Attends meetings of clubs or organizations: Not on file     Relationship status: Not on file    Intimate partner violence:     Fear of current or ex partner: Not on file     Emotionally abused: Not on file     Physically abused: Not on file     Forced sexual activity: Not on file   Other Topics Concern    Not on file   Social History Narrative    Daily caffeine consumption     Past Medical History:   Diagnosis Date    Cardiac disease     Fungal dermatitis     Hyperlipidemia     Hypertension     per [employer]: Essential hypertension ith goal blood pressure less than 130/85;  Resolved:4/20/17    MRSA (methicillin resistant Staphylococcus aureus) infection     Post traumatic stress disorder (PTSD)     Psychiatric disorder      Family History   Problem Relation Age of Onset    Diabetes Mother     Heart disease Father     Coronary artery disease Father     Aortic aneurysm Father         Ruptured abdominal aortic aneurysm    Coronary artery disease Paternal Grandmother     Coronary artery disease Paternal Grandfather      Past Surgical History:   Procedure Laterality Date    CATARACT EXTRACTION      CHOLECYSTECTOMY      CHOLECYSTECTOMY LAPAROSCOPIC N/A 3/7/2016    Procedure: CHOLECYSTECTOMY LAPAROSCOPIC;  Surgeon: Carl Dance, DO;  [facility];  Service:     COLONOSCOPY      HERNIA REPAIR N/A unknown, supraumbilical    JOINT REPLACEMENT Bilateral     hips and knees    VA COLONOSCOPY FLX DX W/COLLJ SPEC WHEN PFRMD N/A 1/9/2018    Procedure: COLONOSCOPY;  Surgeon: Gary Schaefer MD;  Location: [facility]; Service: Gastroenterology    TOTAL HIP ARTHROPLASTY Bilateral 2009, 2010    TOTAL KNEE ARTHROPLASTY Bilateral 2014       Current Outpatient Medications:     metroNIDAZOLE (FLAGYL) 250 mg tablet, , Disp: , Rfl:     quinapril (ACCUPRIL) 10 mg tablet, TAKE 1 TABLET (10 MG TOTAL) BY MOUTH DAILY, Disp: 90 tablet, Rfl: 3    RESTASIS 0 05 % ophthalmic emulsion, INSTILL 1 DROP INTO BOTH EYES TWICE A DAY, Disp: , Rfl: 3    simvastatin (ZOCOR) 80 mg tablet, Take 80 mg by mouth daily at bedtime  , Disp: , Rfl:     traZODone (DESYREL) 50 mg tablet, Take 150 mg by mouth daily at bedtime  , Disp: , Rfl:     XIIDRA 5 % op solution, INSTILL 1 DROP INTO BOTH EYES TWICE A DAY PUT 1 DROP IN BOTH EYES TWICE DAILY, Disp: , Rfl: 6    Allergies   Allergen Reactions    Morphine And Related GI Intolerance          Lab Review   Appointment on 10/10/2019   Component Date Value    WBC 10/10/2019 4 55     RBC 10/10/2019 4 73     Hemoglobin 10/10/2019 14 7     Hematocrit 10/10/2019 46 2     MCV 10/10/2019 98     MCH 10/10/2019 31 1     MCHC 10/10/2019 31 8     RDW 10/10/2019 12 7     Platelets 60/59/3137 174     MPV 10/10/2019 10 0     Sodium 10/10/2019 142     Potassium 10/10/2019 3 9     Chloride 10/10/2019 110*    CO2 10/10/2019 26     ANION GAP 10/10/2019 6     BUN 10/10/2019 19     Creatinine 10/10/2019 1 03     Glucose 10/10/2019 101     Calcium 10/10/2019 8 9     AST 10/10/2019 16     ALT 10/10/2019 30     Alkaline Phosphatase 10/10/2019 38*    Total Protein 10/10/2019 6 9     Albumin 10/10/2019 3 7     Total Bilirubin 10/10/2019 1 19*    eGFR 10/10/2019 73     Cholesterol 10/10/2019 144     Triglycerides 10/10/2019 80     HDL, Direct 10/10/2019 51     LDL Calculated 10/10/2019 77     Non-HDL-Chol (CHOL-HDL) 10/10/2019 93     PSA 10/10/2019 1 1         Imaging: No results found  Objective:     Physical Exam   Constitutional: He is oriented to person, place, and time  He appears well-developed and well-nourished  He does not appear ill  No distress  HENT:   Head: Normocephalic and atraumatic  Eyes: EOM are normal    Neck: Normal range of motion  Neck supple  No hepatojugular reflux present  Cardiovascular: Normal rate, regular rhythm, normal heart sounds and normal pulses  No murmur heard  Pulmonary/Chest: Effort normal and breath sounds normal  He has no decreased breath sounds  He has no wheezes  Abdominal: Soft  Bowel sounds are normal  He exhibits no distension, no ascites and no mass  There is no hepatomegaly  There is no tenderness  There is no guarding  Musculoskeletal: Normal range of motion  Right lower leg: He exhibits no edema  Lymphadenopathy:     He has no cervical adenopathy  Neurological: He is alert and oriented to person, place, and time  He has normal reflexes  Skin: Skin is warm and dry  No rash noted  No erythema  Psychiatric: He has a normal mood and affect  His behavior is normal  Judgment and thought content normal  His mood appears not anxious  There are no Patient Instructions on file for this visit  SUMAN Blunt    Portions of the record may have been created with voice recognition software  Occasional wrong word or "sound a like" substitutions may have occurred due to the inherent limitations of voice recognition software  Read the chart carefully and recognize, using context, where substitutions have occurred

## 2020-01-27 ENCOUNTER — TELEPHONE (OUTPATIENT)
Dept: UROLOGY | Facility: MEDICAL CENTER | Age: 70
End: 2020-01-27

## 2020-01-27 NOTE — TELEPHONE ENCOUNTER
Called and spoke to patient  Made him aware that he should be evaluated by his primary care provider  Made him aware that his PCP will ordered any testing if need and if further concerns he can be referred to Urology appropriately by his PCP  Patient verbalized understanding and will see PCP

## 2020-01-27 NOTE — TELEPHONE ENCOUNTER
Yes, patient should be evaluated by his primary care provider  If further concerns he can be referred to Urology appropriately  Thank you

## 2020-01-27 NOTE — TELEPHONE ENCOUNTER
New patient calling to scheduled a new patient appointment for left testicular pain and swelling  Patient has not seen PCP for groin swelling and pain   Should patient see PCP first and have imaging done first?

## 2020-01-27 NOTE — TELEPHONE ENCOUNTER
Complaint/diagnosis:left testicle swelling and pain    Insurance: medicare     History of Cancer:no    Previous Urologist:no    Outside testing/where:no    Records requested/where:no    Preferred Location:[place]

## 2020-01-30 ENCOUNTER — OFFICE VISIT (OUTPATIENT)
Dept: FAMILY MEDICINE CLINIC | Facility: CLINIC | Age: 70
End: 2020-01-30
Payer: MEDICARE

## 2020-01-30 VITALS
SYSTOLIC BLOOD PRESSURE: 124 MMHG | DIASTOLIC BLOOD PRESSURE: 82 MMHG | BODY MASS INDEX: 35.76 KG/M2 | OXYGEN SATURATION: 98 % | HEIGHT: 72 IN | WEIGHT: 264 LBS | TEMPERATURE: 98.2 F | HEART RATE: 66 BPM | RESPIRATION RATE: 16 BRPM

## 2020-01-30 DIAGNOSIS — N43.3 HYDROCELE, UNSPECIFIED HYDROCELE TYPE: Primary | ICD-10-CM

## 2020-01-30 PROCEDURE — 99213 OFFICE O/P EST LOW 20 MIN: CPT | Performed by: FAMILY MEDICINE

## 2020-01-30 NOTE — PROGRESS NOTES
Assessment/Plan:       Diagnoses and all orders for this visit:    Hydrocele, unspecified hydrocele type  -     US scrotum and testicles; Future        Hydrocele  Will get urologic evaluation after we see results of his ultrasound  Subjective:      Patient ID: Brandon Saavedra is a 78 y o  male  Patient comes in with swelling of his left scrotum  The following portions of the patient's history were reviewed and updated as appropriate:   He has a past medical history of Cardiac disease, Fungal dermatitis, Hyperlipidemia, Hypertension, MRSA (methicillin resistant Staphylococcus aureus) infection, Post traumatic stress disorder (PTSD), and Psychiatric disorder  ,  does not have any pertinent problems on file  ,   has a past surgical history that includes Total knee arthroplasty (Bilateral, 2014); Total hip arthroplasty (Bilateral, 2009, 2010); Hernia repair (N/A, unknown, supraumbilical); CHOLECYSTECTOMY LAPAROSCOPIC (N/A, 3/7/2016); Joint replacement (Bilateral); Cholecystectomy; Colonoscopy; pr colonoscopy flx dx w/collj spec when pfrmd (N/A, 1/9/2018); and Cataract extraction  ,  family history includes Aortic aneurysm in his father; Coronary artery disease in his father, paternal grandfather, and paternal grandmother; Diabetes in his mother; Heart disease in his father  ,   reports that he has quit smoking  His smoking use included cigarettes  He quit after 40 00 years of use  He has quit using smokeless tobacco  He reports that he does not drink alcohol or use drugs  ,  is allergic to morphine and related     Current Outpatient Medications   Medication Sig Dispense Refill    quinapril (ACCUPRIL) 10 mg tablet TAKE 1 TABLET (10 MG TOTAL) BY MOUTH DAILY 90 tablet 3    RESTASIS 0 05 % ophthalmic emulsion INSTILL 1 DROP INTO BOTH EYES TWICE A DAY  3    simvastatin (ZOCOR) 80 mg tablet Take 80 mg by mouth daily at bedtime   traZODone (DESYREL) 50 mg tablet Take 150 mg by mouth daily at bedtime        metroNIDAZOLE (FLAGYL) 250 mg tablet       XIIDRA 5 % op solution INSTILL 1 DROP INTO BOTH EYES TWICE A DAY PUT 1 DROP IN BOTH EYES TWICE DAILY  6     No current facility-administered medications for this visit  Review of Systems   Respiratory: Negative  Cardiovascular: Negative  Gastrointestinal: Negative  Genitourinary: Positive for scrotal swelling  Negative for difficulty urinating, dysuria, frequency and hematuria  Objective:  Vitals:    01/30/20 1359   BP: 124/82   Pulse: 66   Resp: 16   Temp: 98 2 °F (36 8 °C)   SpO2: 98%   Weight: 120 kg (264 lb)   Height: 6' (1 829 m)     Body mass index is 35 8 kg/m²       Physical Exam   Genitourinary:   Genitourinary Comments: Hydrocele left hemiscrotum

## 2020-02-05 ENCOUNTER — HOSPITAL ENCOUNTER (OUTPATIENT)
Dept: ULTRASOUND IMAGING | Facility: HOSPITAL | Age: 70
Discharge: HOME | End: 2020-02-05
Attending: FAMILY MEDICINE
Payer: MEDICARE

## 2020-02-05 ENCOUNTER — OFFICE VISIT (OUTPATIENT)
Dept: CARDIOLOGY CLINIC | Facility: CLINIC | Age: 70
End: 2020-02-05
Payer: MEDICARE

## 2020-02-05 VITALS
HEIGHT: 72 IN | BODY MASS INDEX: 35.89 KG/M2 | HEART RATE: 67 BPM | WEIGHT: 265 LBS | DIASTOLIC BLOOD PRESSURE: 78 MMHG | SYSTOLIC BLOOD PRESSURE: 138 MMHG | OXYGEN SATURATION: 94 %

## 2020-02-05 DIAGNOSIS — I65.22 STENOSIS OF LEFT CAROTID ARTERY: ICD-10-CM

## 2020-02-05 DIAGNOSIS — N43.3 HYDROCELE, UNSPECIFIED HYDROCELE TYPE: ICD-10-CM

## 2020-02-05 DIAGNOSIS — R07.2 PRECORDIAL CHEST PAIN: Primary | ICD-10-CM

## 2020-02-05 DIAGNOSIS — E78.2 MIXED HYPERLIPIDEMIA: ICD-10-CM

## 2020-02-05 DIAGNOSIS — R06.00 DYSPNEA ON EXERTION: ICD-10-CM

## 2020-02-05 DIAGNOSIS — I10 ESSENTIAL HYPERTENSION: ICD-10-CM

## 2020-02-05 PROCEDURE — 99214 OFFICE O/P EST MOD 30 MIN: CPT | Performed by: INTERNAL MEDICINE

## 2020-02-05 PROCEDURE — 1160F RVW MEDS BY RX/DR IN RCRD: CPT | Performed by: INTERNAL MEDICINE

## 2020-02-05 PROCEDURE — 1036F TOBACCO NON-USER: CPT | Performed by: INTERNAL MEDICINE

## 2020-02-05 PROCEDURE — 4040F PNEUMOC VAC/ADMIN/RCVD: CPT | Performed by: INTERNAL MEDICINE

## 2020-02-05 PROCEDURE — 3075F SYST BP GE 130 - 139MM HG: CPT | Performed by: INTERNAL MEDICINE

## 2020-02-05 PROCEDURE — 76870 US EXAM SCROTUM: CPT

## 2020-02-05 PROCEDURE — 93000 ELECTROCARDIOGRAM COMPLETE: CPT | Performed by: INTERNAL MEDICINE

## 2020-02-05 PROCEDURE — 3078F DIAST BP <80 MM HG: CPT | Performed by: INTERNAL MEDICINE

## 2020-02-05 PROCEDURE — 3008F BODY MASS INDEX DOCD: CPT | Performed by: INTERNAL MEDICINE

## 2020-02-05 NOTE — PROGRESS NOTES
[facility] [place]  [address] [facility] [place] [address]  Cardiology Follow Up    Nafisa Orozco  1950  [id number]      1  Precordial chest pain  Ambulatory referral to Cardiology   2  Dyspnea on exertion  Echo complete with contrast if indicated    NM myocardial perfusion spect (rx stress and/or rest)    POCT ECG   3  Essential hypertension     4  Mixed hyperlipidemia     5  Stenosis of left carotid artery         Chief Complaint   Patient presents with    Follow-up    Chest pain/shortness of breath    Interval History:   65-year-old male with hypertension, hyperlipidemia, carotid artery disease, ex-smoker was sent from primary care physician's office for chest pain evaluation  Patient was last seen in April of 2017 and was lost for follow-up  Patient experience pressure-like intermittent substernal chest pain without any aggravating(no worsening of symptoms on movement, deep breathing or cough) or relieving factor, associated with shortness of breath lasting few minutes  Patient experience above symptoms for almost 2 weeks  As per patient although symptoms were not constant but was varying in intensity  In baseline patient does not exert much but felt shortness of breath on exertions which is something new for him  Patient denies nausea, vomiting, diaphoresis, dizziness, leg edema or loss of consciousness  Patient has gained weight lately   Never had similar chest pain in the past   No significant finding on echo and a Lexiscan MPI stress test in 2017  Patient quit smoking 15-20 years back  Family history of abdomen aneurysm in dad and mother had diabetes  Great grandparents had MI  Labs including vitals reviewed  LDL 80   Troponin done as an outpatient is negative      Review of Systems:  Review of system negative except as mentioned above    Patient Active Problem List   Diagnosis    Post traumatic stress disorder    Mixed hyperlipidemia    Cardiac disease    Essential hypertension    Elevated blood sugar    Diarrhea    Multiple allergies    Seborrheic keratosis    Tension headache    Cervicalgia    Stenosis of left carotid artery    Hydrocele     Past Medical History:   Diagnosis Date    Cardiac disease     Fungal dermatitis     Hyperlipidemia     Hypertension     per Allscripts: Essential hypertension ith goal blood pressure less than 130/85;  Resolved:4/20/17    MRSA (methicillin resistant Staphylococcus aureus) infection     Post traumatic stress disorder (PTSD)     Psychiatric disorder      Social History     Socioeconomic History    Marital status: /Civil Union     Spouse name: Not on file    Number of children: Not on file    Years of education: Not on file    Highest education level: Not on file   Occupational History    Occupation: Retired   Social Needs    Financial resource strain: Not on file    Food insecurity:     Worry: Not on file     Inability: Not on file   [employer] needs:     Medical: Not on file     Non-medical: Not on file   Tobacco Use    Smoking status: Former Smoker     Years: 40 00     Types: Cigarettes    Smokeless tobacco: Former User   Substance and Sexual Activity    Alcohol use: No    Drug use: No    Sexual activity: Not on file   Lifestyle    Physical activity:     Days per week: Not on file     Minutes per session: Not on file    Stress: Not on file   Relationships    Social connections:     Talks on phone: Not on file     Gets together: Not on file     Attends [employer] service: Not on file     Active member of club or organization: Not on file     Attends meetings of clubs or organizations: Not on file     Relationship status: Not on file    Intimate partner violence:     Fear of current or ex partner: Not on file     Emotionally abused: Not on file     Physically abused: Not on file     Forced sexual activity: Not on file   Other Topics Concern    Not on file   Social History Narrative    Daily caffeine consumption      Family History   Problem Relation Age of Onset    Diabetes Mother     Heart disease Father     Coronary artery disease Father     Aortic aneurysm Father         Ruptured abdominal aortic aneurysm    Coronary artery disease Paternal Grandmother     Coronary artery disease Paternal Grandfather      Past Surgical History:   Procedure Laterality Date    CATARACT EXTRACTION      CHOLECYSTECTOMY      CHOLECYSTECTOMY LAPAROSCOPIC N/A 3/7/2016    Procedure: CHOLECYSTECTOMY LAPAROSCOPIC;  Surgeon: Danny Lopes DO;  [facility];  Service:     COLONOSCOPY      HERNIA REPAIR N/A unknown, supraumbilical    JOINT REPLACEMENT Bilateral     hips and knees    MA COLONOSCOPY FLX DX W/COLLJ SPEC WHEN PFRMD N/A 1/9/2018    Procedure: COLONOSCOPY;  Surgeon: Alberta Ramirez MD;  [facility]; Service: Gastroenterology    TOTAL HIP ARTHROPLASTY Bilateral 2009, 2010    TOTAL KNEE ARTHROPLASTY Bilateral 2014       Current Outpatient Medications:     quinapril (ACCUPRIL) 10 mg tablet, TAKE 1 TABLET (10 MG TOTAL) BY MOUTH DAILY, Disp: 90 tablet, Rfl: 3    RESTASIS 0 05 % ophthalmic emulsion, INSTILL 1 DROP INTO BOTH EYES TWICE A DAY, Disp: , Rfl: 3    simvastatin (ZOCOR) 80 mg tablet, Take 80 mg by mouth daily at bedtime  , Disp: , Rfl:     traZODone (DESYREL) 50 mg tablet, Take 150 mg by mouth daily at bedtime  , Disp: , Rfl:   Allergies   Allergen Reactions    Morphine And Related GI Intolerance       Labs:  Appointment on 01/14/2020   Component Date Value    Troponin I 01/14/2020 <0 02     Sodium 01/14/2020 144     Potassium 01/14/2020 4 2     Chloride 01/14/2020 111*    CO2 01/14/2020 29     ANION GAP 01/14/2020 4     BUN 01/14/2020 17     Creatinine 01/14/2020 1 08     Glucose 01/14/2020 111     Calcium 01/14/2020 9 1     AST 01/14/2020 14     ALT 01/14/2020 35     Alkaline Phosphatase 01/14/2020 41*    Total Protein 01/14/2020 7 0     Albumin 01/14/2020 3  7     Total Bilirubin 01/14/2020 0 68     eGFR 01/14/2020 69     WBC 01/14/2020 5 57     RBC 01/14/2020 4 85     Hemoglobin 01/14/2020 14 8     Hematocrit 01/14/2020 47 1     MCV 01/14/2020 97     MCH 01/14/2020 30 5     MCHC 01/14/2020 31 4     RDW 01/14/2020 12 5     MPV 01/14/2020 9 7     Platelets 24/99/2189 173     nRBC 01/14/2020 0     Neutrophils Relative 01/14/2020 63     Immat GRANS % 01/14/2020 0     Lymphocytes Relative 01/14/2020 26     Monocytes Relative 01/14/2020 10     Eosinophils Relative 01/14/2020 1     Basophils Relative 01/14/2020 0     Neutrophils Absolute 01/14/2020 3 49     Immature Grans Absolute 01/14/2020 0 02     Lymphocytes Absolute 01/14/2020 1 42     Monocytes Absolute 01/14/2020 0 54     Eosinophils Absolute 01/14/2020 0 08     Basophils Absolute 01/14/2020 0 02     Cholesterol 01/14/2020 147     Triglycerides 01/14/2020 93     HDL, Direct 01/14/2020 48     LDL Calculated 01/14/2020 80     Non-HDL-Chol (CHOL-HDL) 01/14/2020 99      Imaging: No results found  Physical Exam:  General:  Obese, awake, alert and oriented x3, not in distress  Neck: supple, no JVD  Eyes: PERRL, conjunctiva normal  Lungs:  Bilateral air entry positive, no wheeze/rhonchi or crackle  Heart:  S1-S2 normal, no murmur  Abdomen:  Soft ,nondistended ,nontender, bowel sounds positive  Extremities:  No leg edema, no deformity, ROM normal  Neuro:  Moving all extremities, speech clear  Skin: warm, no rash    /78 (BP Location: Left arm, Patient Position: Sitting, Cuff Size: Standard)   Pulse 67   Ht 6' (1 829 m)   Wt 120 kg (265 lb)   SpO2 94%   BMI 35 94 kg/m²     Cardiographics :  EKG today shows sinus rhythm, normal axis    Assessment:    1  Chest pain  Intermediate probability of angina  EKG shows sinus rhythm, normal axis  Blood pressure is controlled  Patient getting short of breath on exertion    2  Hypertension  Controlled  On quinapril 10 mg daily    3  Hyperlipidemia  On Zocor 80 mg    4  Carotid artery stenosis  Patient denies any dizziness or syncope  Carotid duplex in October of 2019 showed carotid stenosis between 1-49%    5  Ex-smoker    Recommendations:  2D echocardiogram to evaluate for structural heart disease  Patient with cardiac risk factors including above-mentioned symptoms will benefit from further evaluation of coronary artery disease - Lexiscan MPI stress test will be scheduled(patient was offered exercise MPI but as per patient he cannot walk fast due to hip and knee pain)  Patient advised to take baby aspirin  Continue quinapril and statin  Patient advised to take low-salt, low-fat/low-cholesterol diet  Advised to call 911 if recurrent chest pain or progression of symptoms  Return to clinic in 2 months  Above all discussed with patient    Patient understands and agrees

## 2020-02-10 ENCOUNTER — TELEPHONE (OUTPATIENT)
Dept: FAMILY MEDICINE CLINIC | Facility: CLINIC | Age: 70
End: 2020-02-10

## 2020-02-10 DIAGNOSIS — K40.90 UNILATERAL INGUINAL HERNIA WITHOUT OBSTRUCTION OR GANGRENE, RECURRENCE NOT SPECIFIED: Primary | ICD-10-CM

## 2020-02-10 NOTE — TELEPHONE ENCOUNTER
----- Message from Dionne Hogue MD sent at 2/10/2020  8:59 AM EST -----  Call patient, swelling of scrotum on left side is an inguinal hernia  Should see general surgeon for repair    Referral done

## 2020-02-24 ENCOUNTER — CONSULT (OUTPATIENT)
Dept: UROLOGY | Facility: CLINIC | Age: 70
End: 2020-02-24
Payer: MEDICARE

## 2020-02-24 VITALS
DIASTOLIC BLOOD PRESSURE: 66 MMHG | WEIGHT: 269.2 LBS | SYSTOLIC BLOOD PRESSURE: 130 MMHG | HEART RATE: 68 BPM | HEIGHT: 72 IN | BODY MASS INDEX: 36.46 KG/M2

## 2020-02-24 DIAGNOSIS — N40.1 BPH WITH OBSTRUCTION/LOWER URINARY TRACT SYMPTOMS: Primary | ICD-10-CM

## 2020-02-24 DIAGNOSIS — N50.89 TESTICULAR SWELLING, LEFT: ICD-10-CM

## 2020-02-24 DIAGNOSIS — N13.8 BPH WITH OBSTRUCTION/LOWER URINARY TRACT SYMPTOMS: Primary | ICD-10-CM

## 2020-02-24 PROCEDURE — 3075F SYST BP GE 130 - 139MM HG: CPT | Performed by: PHYSICIAN ASSISTANT

## 2020-02-24 PROCEDURE — 4040F PNEUMOC VAC/ADMIN/RCVD: CPT | Performed by: PHYSICIAN ASSISTANT

## 2020-02-24 PROCEDURE — 1160F RVW MEDS BY RX/DR IN RCRD: CPT | Performed by: PHYSICIAN ASSISTANT

## 2020-02-24 PROCEDURE — 3008F BODY MASS INDEX DOCD: CPT | Performed by: PHYSICIAN ASSISTANT

## 2020-02-24 PROCEDURE — 1036F TOBACCO NON-USER: CPT | Performed by: PHYSICIAN ASSISTANT

## 2020-02-24 PROCEDURE — 3078F DIAST BP <80 MM HG: CPT | Performed by: PHYSICIAN ASSISTANT

## 2020-02-24 PROCEDURE — 99203 OFFICE O/P NEW LOW 30 MIN: CPT | Performed by: PHYSICIAN ASSISTANT

## 2020-02-24 RX ORDER — TAMSULOSIN HYDROCHLORIDE 0.4 MG/1
0.4 CAPSULE ORAL
Qty: 30 CAPSULE | Refills: 10 | Status: SHIPPED | OUTPATIENT
Start: 2020-02-24 | End: 2020-10-23

## 2020-02-24 NOTE — PROGRESS NOTES
UROLOGY CONSULTATION NOTE     Patient Identifiers: Una Hernandez (MRN: [id number])  Service Requesting Consultation: Wil Hurst DO  Service Providing Consultation:  Urology, Patrick Ray PA-C  Consults  Date of Service: 2/24/2020    Reason for Consultation: LEFT SCROTAL SWELLING    History of Present Illness:     Una Hernandez is a 78 y o  Male with a history of fat containing left inguinal hernia  He was seen by his family doctor who sent him to General surgery for evaluation  He complained also about left-sided scrotal swelling  He has no significant discomfort  A scrotal ultrasound was done showing normal testes and epididymis  A tiny amount of hydrocele fluid with no other findings  Swelling has been there for several months and does not change whether he is standing or lying  He also complains of nocturia about every 2 hours  He has no incontinence or burning  He has never seen a urologist in the past   He denies any family history of prostate bladder or kidney diseases  Lastly he had 1 episode where he had speck of blood in his semen  This is never happened before or since  His last PSA was 1 1  Past Medical, Past Surgical History:     Past Medical History:   Diagnosis Date    Cardiac disease     Fungal dermatitis     Hyperlipidemia     Hypertension     per Allscripts: Essential hypertension ith goal blood pressure less than 130/85;  Resolved:4/20/17    MRSA (methicillin resistant Staphylococcus aureus) infection     Post traumatic stress disorder (PTSD)     Psychiatric disorder    :    Past Surgical History:   Procedure Laterality Date    CATARACT EXTRACTION      CHOLECYSTECTOMY      CHOLECYSTECTOMY LAPAROSCOPIC N/A 3/7/2016    Procedure: CHOLECYSTECTOMY LAPAROSCOPIC;  Surgeon: Wil Hurst DO;  [facility];  Service:     COLONOSCOPY      HERNIA REPAIR N/A unknown, supraumbilical    JOINT REPLACEMENT Bilateral     hips and knees    OR COLONOSCOPY FLX DX W/KEYURJ [facility] WHEN PFRMD N/A 1/9/2018    Procedure: COLONOSCOPY;  Surgeon: Sukhwinder Cho MD;  [facility]; Service: Gastroenterology    TOTAL HIP ARTHROPLASTY Bilateral 2009, 2010    TOTAL KNEE ARTHROPLASTY Bilateral 2014   :    Medications, Allergies:     Current Outpatient Medications:     quinapril (ACCUPRIL) 10 mg tablet, TAKE 1 TABLET (10 MG TOTAL) BY MOUTH DAILY, Disp: 90 tablet, Rfl: 3    RESTASIS 0 05 % ophthalmic emulsion, INSTILL 1 DROP INTO BOTH EYES TWICE A DAY, Disp: , Rfl: 3    simvastatin (ZOCOR) 80 mg tablet, Take 80 mg by mouth daily at bedtime  , Disp: , Rfl:     traZODone (DESYREL) 50 mg tablet, Take 150 mg by mouth daily at bedtime  , Disp: , Rfl:     tamsulosin (FLOMAX) 0 4 mg, Take 1 capsule (0 4 mg total) by mouth daily with dinner, Disp: 30 capsule, Rfl: 10    Allergies: Allergies   Allergen Reactions    Morphine And Related GI Intolerance   :    Social and Family History:   Social History:   Social History     Tobacco Use    Smoking status: Former Smoker     Years: 40 00     Types: Cigarettes    Smokeless tobacco: Former User   Substance Use Topics    Alcohol use: No    Drug use: No        Social History     Tobacco Use   Smoking Status Former Smoker    Years: 40 00    Types: Cigarettes   Smokeless Tobacco Former User       Family History:  Family History   Problem Relation Age of Onset    Diabetes Mother     Heart disease Father     Coronary artery disease Father     Aortic aneurysm Father         Ruptured abdominal aortic aneurysm    Coronary artery disease Paternal Grandmother     Coronary artery disease Paternal Grandfather    :     Review of Systems:     General: Fever, chills, or night sweats: negative  Cardiac: Negative for chest pain  Pulmonary: Negative for shortness of breath  Gastrointestinal: Abdominal pain negative  Nausea, vomiting, or diarrhea negative,  Genitourinary: See HPI above  Patient does not have hematuria    All other systems queried were negative  Physical Exam:   General: Patient is pleasant and in NAD  Awake and alert  /66 (BP Location: Left arm, Patient Position: Sitting, Cuff Size: Adult)   Pulse 68   Ht 6' (1 829 m)   Wt 122 kg (269 lb 3 2 oz)   BMI 36 51 kg/m²   Cardiac: Peripheral edema: negative  Pulmonary: Non-labored breathing  Abdomen: Soft, non-tender, non-distended  No surgical scars  No masses, tenderness, hernias noted  Genitourinary: Negative CVA tenderness, negative suprapubic tenderness  (Male): Penis circumcised, phallus normal, meatus patent  Testicles descended into scrotum bilaterally without masses nor tenderness  There is some swelling superiorly to the testicle  I do not feel any significant hernia  No inguinal hernias bilaterally  JACKY: Prostate is not enlarged at 30 grams  The prostate is not boggy  The prostate is not tender  No nodules noted  Labs:     Lab Results   Component Value Date    HGB 14 8 01/14/2020    HCT 47 1 01/14/2020    WBC 5 57 01/14/2020     01/14/2020   ]    Lab Results   Component Value Date     03/21/2017    K 4 2 01/14/2020     (H) 01/14/2020    CO2 29 01/14/2020    BUN 17 01/14/2020    CREATININE 1 08 01/14/2020    CALCIUM 9 1 01/14/2020   ]    Imaging:   I personally reviewed the images and report of the following studies, and reviewed them with the patient:   SCROTAL ULTRASOUND        IMPRESSION:     No testicular or epididymal pathology  Normal flow within the testicles  Moderate-sized fat-containing left inguinal hernia  Small hydroceles bilaterally  ASSESSMENT:       1  Left scrotal swelling   2  BPH   3   Hematospermia    PLAN:   - I believe his swelling is from fat related to his hernia and is no other pathology in the scrotum  - he should go ahead and have his hernia repaired if that is the plan  - I put him on tamsulosin 0 4 mg and will follow up in about 3 months  - I reassured him in regards to his hematospermia  - he will let me know if he has any other episodes    Thank you for allowing me to participate in this patients care  Please do not hesitate to call with any additional questions    Alecia Smith PA-C

## 2020-02-26 ENCOUNTER — HOSPITAL ENCOUNTER (OUTPATIENT)
Dept: NON INVASIVE DIAGNOSTICS | Facility: CLINIC | Age: 70
Discharge: HOME | End: 2020-02-26
Payer: MEDICARE

## 2020-02-26 DIAGNOSIS — R06.00 DYSPNEA ON EXERTION: ICD-10-CM

## 2020-02-26 PROCEDURE — 93018 CV STRESS TEST I&R ONLY: CPT | Performed by: INTERNAL MEDICINE

## 2020-02-26 PROCEDURE — A9502 TC99M TETROFOSMIN: HCPCS

## 2020-02-26 PROCEDURE — 93306 TTE W/DOPPLER COMPLETE: CPT

## 2020-02-26 PROCEDURE — 93016 CV STRESS TEST SUPVJ ONLY: CPT | Performed by: INTERNAL MEDICINE

## 2020-02-26 PROCEDURE — 78452 HT MUSCLE IMAGE SPECT MULT: CPT | Performed by: INTERNAL MEDICINE

## 2020-02-26 PROCEDURE — 78452 HT MUSCLE IMAGE SPECT MULT: CPT

## 2020-02-26 PROCEDURE — 93017 CV STRESS TEST TRACING ONLY: CPT

## 2020-02-26 PROCEDURE — 93306 TTE W/DOPPLER COMPLETE: CPT | Performed by: INTERNAL MEDICINE

## 2020-02-26 RX ADMIN — REGADENOSON 0.4 MG: 0.08 INJECTION, SOLUTION INTRAVENOUS at 08:46

## 2020-02-27 DIAGNOSIS — R07.2 PRECORDIAL CHEST PAIN: Primary | ICD-10-CM

## 2020-02-28 ENCOUNTER — APPOINTMENT (OUTPATIENT)
Dept: LAB | Facility: CLINIC | Age: 70
End: 2020-02-28
Payer: MEDICARE

## 2020-02-28 ENCOUNTER — TELEPHONE (OUTPATIENT)
Dept: CARDIOLOGY CLINIC | Facility: CLINIC | Age: 70
End: 2020-02-28

## 2020-02-28 LAB
ALBUMIN SERPL BCP-MCNC: 3.8 G/DL (ref 3.5–5)
ALP SERPL-CCNC: 41 U/L (ref 46–116)
ALT SERPL W P-5'-P-CCNC: 32 U/L (ref 12–78)
ANION GAP SERPL CALCULATED.3IONS-SCNC: 4 MMOL/L (ref 4–13)
APTT PPP: 30 SECONDS (ref 23–37)
AST SERPL W P-5'-P-CCNC: 16 U/L (ref 5–45)
BASOPHILS # BLD AUTO: 0.02 THOUSANDS/ΜL (ref 0–0.1)
BASOPHILS NFR BLD AUTO: 0 % (ref 0–1)
BILIRUB SERPL-MCNC: 1.09 MG/DL (ref 0.2–1)
BUN SERPL-MCNC: 18 MG/DL (ref 5–25)
CALCIUM SERPL-MCNC: 9.2 MG/DL (ref 8.3–10.1)
CHLORIDE SERPL-SCNC: 109 MMOL/L (ref 100–108)
CO2 SERPL-SCNC: 28 MMOL/L (ref 21–32)
CREAT SERPL-MCNC: 1.04 MG/DL (ref 0.6–1.3)
EOSINOPHIL # BLD AUTO: 0.09 THOUSAND/ΜL (ref 0–0.61)
EOSINOPHIL NFR BLD AUTO: 2 % (ref 0–6)
ERYTHROCYTE [DISTWIDTH] IN BLOOD BY AUTOMATED COUNT: 12.5 % (ref 11.6–15.1)
GFR SERPL CREATININE-BSD FRML MDRD: 72 ML/MIN/1.73SQ M
GLUCOSE P FAST SERPL-MCNC: 108 MG/DL (ref 65–99)
HCT VFR BLD AUTO: 47 % (ref 36.5–49.3)
HGB BLD-MCNC: 15 G/DL (ref 12–17)
IMM GRANULOCYTES # BLD AUTO: 0.02 THOUSAND/UL (ref 0–0.2)
IMM GRANULOCYTES NFR BLD AUTO: 0 % (ref 0–2)
INR PPP: 1.02 (ref 0.84–1.19)
LYMPHOCYTES # BLD AUTO: 1.52 THOUSANDS/ΜL (ref 0.6–4.47)
LYMPHOCYTES NFR BLD AUTO: 30 % (ref 14–44)
MCH RBC QN AUTO: 30.8 PG (ref 26.8–34.3)
MCHC RBC AUTO-ENTMCNC: 31.9 G/DL (ref 31.4–37.4)
MCV RBC AUTO: 97 FL (ref 82–98)
MONOCYTES # BLD AUTO: 0.48 THOUSAND/ΜL (ref 0.17–1.22)
MONOCYTES NFR BLD AUTO: 10 % (ref 4–12)
NEUTROPHILS # BLD AUTO: 2.88 THOUSANDS/ΜL (ref 1.85–7.62)
NEUTS SEG NFR BLD AUTO: 58 % (ref 43–75)
NRBC BLD AUTO-RTO: 0 /100 WBCS
PLATELET # BLD AUTO: 171 THOUSANDS/UL (ref 149–390)
PMV BLD AUTO: 9.7 FL (ref 8.9–12.7)
POTASSIUM SERPL-SCNC: 3.8 MMOL/L (ref 3.5–5.3)
PROT SERPL-MCNC: 7.2 G/DL (ref 6.4–8.2)
PROTHROMBIN TIME: 13 SECONDS (ref 11.6–14.5)
RBC # BLD AUTO: 4.87 MILLION/UL (ref 3.88–5.62)
SODIUM SERPL-SCNC: 141 MMOL/L (ref 136–145)
WBC # BLD AUTO: 5.01 THOUSAND/UL (ref 4.31–10.16)

## 2020-02-28 PROCEDURE — 80053 COMPREHEN METABOLIC PANEL: CPT | Performed by: INTERNAL MEDICINE

## 2020-02-28 PROCEDURE — 36415 COLL VENOUS BLD VENIPUNCTURE: CPT | Performed by: INTERNAL MEDICINE

## 2020-02-28 PROCEDURE — 85610 PROTHROMBIN TIME: CPT | Performed by: INTERNAL MEDICINE

## 2020-02-28 PROCEDURE — 85025 COMPLETE CBC W/AUTO DIFF WBC: CPT | Performed by: INTERNAL MEDICINE

## 2020-02-28 PROCEDURE — 85730 THROMBOPLASTIN TIME PARTIAL: CPT | Performed by: INTERNAL MEDICINE

## 2020-02-28 NOTE — TELEPHONE ENCOUNTER
----- Message from Naomi Nguyễn MD sent at 2/27/2020  5:21 PM EST -----  I reviewed echo and stress test results and discussed with the patient  Patient will be scheduled for cardiac catheterization  Risks, benefits and alternatives of cardiac catheterization explained to the patient  Patient advised to continue aspirin, statin and metoprolol  Please call the patient and give samples for ranexa  Cardiac catheterization will be scheduled   Labs ordered

## 2020-02-28 NOTE — TELEPHONE ENCOUNTER
S/w patient, advised to continue meds and verbally understood  FYI  Offices do not carry anymore samples of Ranexa, is there anything else you would like for patient?

## 2020-03-02 ENCOUNTER — TELEPHONE (OUTPATIENT)
Dept: CARDIOLOGY CLINIC | Facility: CLINIC | Age: 70
End: 2020-03-02

## 2020-03-02 NOTE — TELEPHONE ENCOUNTER
----- Message from Leslie Brizuela MD sent at 2/27/2020  5:29 PM EST -----  Hi this patient needs cardiac catheterization for chest pain and abnormal stress test   I ordered cardiac catheterization including labs    Please try to schedule him in 2nd week of March as I will be in hospital   Thank you

## 2020-03-05 ENCOUNTER — TELEPHONE (OUTPATIENT)
Dept: INTERVENTIONAL RADIOLOGY/VASCULAR | Facility: HOSPITAL | Age: 70
End: 2020-03-05

## 2020-03-05 RX ORDER — SODIUM CHLORIDE 9 MG/ML
50 INJECTION, SOLUTION INTRAVENOUS CONTINUOUS
Status: CANCELLED | OUTPATIENT
Start: 2020-03-10

## 2020-03-09 ENCOUNTER — TELEPHONE (OUTPATIENT)
Dept: INTERVENTIONAL RADIOLOGY/VASCULAR | Facility: HOSPITAL | Age: 70
End: 2020-03-09

## 2020-03-10 ENCOUNTER — HOSPITAL ENCOUNTER (OUTPATIENT)
Dept: INTERVENTIONAL RADIOLOGY/VASCULAR | Facility: HOSPITAL | Age: 70
Discharge: HOME | End: 2020-03-10
Attending: INTERNAL MEDICINE
Payer: MEDICARE

## 2020-03-10 VITALS
SYSTOLIC BLOOD PRESSURE: 110 MMHG | OXYGEN SATURATION: 96 % | RESPIRATION RATE: 16 BRPM | WEIGHT: 260 LBS | DIASTOLIC BLOOD PRESSURE: 62 MMHG | HEIGHT: 72 IN | HEART RATE: 55 BPM | TEMPERATURE: 96.9 F | BODY MASS INDEX: 35.21 KG/M2

## 2020-03-10 DIAGNOSIS — I10 ESSENTIAL HYPERTENSION: ICD-10-CM

## 2020-03-10 DIAGNOSIS — F17.200 SMOKER: Primary | ICD-10-CM

## 2020-03-10 DIAGNOSIS — R07.2 PRECORDIAL CHEST PAIN: ICD-10-CM

## 2020-03-10 PROCEDURE — 99152 MOD SED SAME PHYS/QHP 5/>YRS: CPT | Performed by: INTERNAL MEDICINE

## 2020-03-10 PROCEDURE — C1894 INTRO/SHEATH, NON-LASER: HCPCS

## 2020-03-10 PROCEDURE — 99153 MOD SED SAME PHYS/QHP EA: CPT

## 2020-03-10 PROCEDURE — C1769 GUIDE WIRE: HCPCS

## 2020-03-10 PROCEDURE — 93458 L HRT ARTERY/VENTRICLE ANGIO: CPT | Performed by: INTERNAL MEDICINE

## 2020-03-10 PROCEDURE — 99152 MOD SED SAME PHYS/QHP 5/>YRS: CPT

## 2020-03-10 PROCEDURE — 93458 L HRT ARTERY/VENTRICLE ANGIO: CPT

## 2020-03-10 RX ORDER — NITROGLYCERIN 20 MG/100ML
INJECTION INTRAVENOUS CODE/TRAUMA/SEDATION MEDICATION
Status: COMPLETED | OUTPATIENT
Start: 2020-03-10 | End: 2020-03-10

## 2020-03-10 RX ORDER — LIDOCAINE WITH 8.4% SOD BICARB 0.9%(10ML)
SYRINGE (ML) INJECTION CODE/TRAUMA/SEDATION MEDICATION
Status: COMPLETED | OUTPATIENT
Start: 2020-03-10 | End: 2020-03-10

## 2020-03-10 RX ORDER — MIDAZOLAM HYDROCHLORIDE 2 MG/2ML
INJECTION, SOLUTION INTRAMUSCULAR; INTRAVENOUS CODE/TRAUMA/SEDATION MEDICATION
Status: COMPLETED | OUTPATIENT
Start: 2020-03-10 | End: 2020-03-10

## 2020-03-10 RX ORDER — FENTANYL CITRATE 50 UG/ML
INJECTION, SOLUTION INTRAMUSCULAR; INTRAVENOUS CODE/TRAUMA/SEDATION MEDICATION
Status: COMPLETED | OUTPATIENT
Start: 2020-03-10 | End: 2020-03-10

## 2020-03-10 RX ORDER — SODIUM CHLORIDE 9 MG/ML
50 INJECTION, SOLUTION INTRAVENOUS CONTINUOUS
Status: DISCONTINUED | OUTPATIENT
Start: 2020-03-10 | End: 2020-03-14 | Stop reason: HOSPADM

## 2020-03-10 RX ORDER — VERAPAMIL HCL 2.5 MG/ML
AMPUL (ML) INTRAVENOUS CODE/TRAUMA/SEDATION MEDICATION
Status: COMPLETED | OUTPATIENT
Start: 2020-03-10 | End: 2020-03-10

## 2020-03-10 RX ORDER — SODIUM CHLORIDE 9 MG/ML
75 INJECTION, SOLUTION INTRAVENOUS CONTINUOUS
Status: DISPENSED | OUTPATIENT
Start: 2020-03-10 | End: 2020-03-10

## 2020-03-10 RX ORDER — HEPARIN SODIUM 1000 [USP'U]/ML
INJECTION, SOLUTION INTRAVENOUS; SUBCUTANEOUS CODE/TRAUMA/SEDATION MEDICATION
Status: COMPLETED | OUTPATIENT
Start: 2020-03-10 | End: 2020-03-10

## 2020-03-10 RX ADMIN — SODIUM CHLORIDE 50 ML/HR: 0.9 INJECTION, SOLUTION INTRAVENOUS at 07:40

## 2020-03-10 RX ADMIN — MIDAZOLAM HYDROCHLORIDE 1 MG: 1 INJECTION, SOLUTION INTRAMUSCULAR; INTRAVENOUS at 08:04

## 2020-03-10 RX ADMIN — IODIXANOL 35 ML: 320 INJECTION, SOLUTION INTRAVASCULAR at 08:26

## 2020-03-10 RX ADMIN — FENTANYL CITRATE 50 MCG: 50 INJECTION, SOLUTION INTRAMUSCULAR; INTRAVENOUS at 08:04

## 2020-03-10 RX ADMIN — HEPARIN SODIUM 5000 UNITS: 1000 INJECTION INTRAVENOUS; SUBCUTANEOUS at 08:16

## 2020-03-10 RX ADMIN — NITROGLYCERIN 200 MCG: 20 INJECTION INTRAVENOUS at 08:14

## 2020-03-10 RX ADMIN — VERAPAMIL HYDROCHLORIDE 2.5 MG: 2.5 INJECTION, SOLUTION INTRAVENOUS at 08:14

## 2020-03-10 RX ADMIN — Medication 2 ML: at 08:12

## 2020-03-10 NOTE — DISCHARGE INSTRUCTIONS
After Radial Heart Catheterization     WHAT YOU NEED TO KNOW:     What will happen after a radial heart catheterization? · You will be attached to a heart monitor until you are fully awake  A heart monitor is an EKG that stays on continuously to record your heart's electrical activity  Healthcare providers will monitor your vital signs and pulses in your arm  They will frequently check your pressure bandage for bleeding or swelling  · You may have a band wrapped tightly around your wrist  The band puts pressure on your wound and helps prevent bleeding  A healthcare provider can put air into the band or remove air from the band  A healthcare provider will gradually remove air from the band and decrease pressure on your wrist  The band may be removed in 2 hours or when your wound stops bleeding  · You will need to keep your wrist straight for 2 to 4 hours  Do not  push or pull with your arm  Arm movements can cause serious bleeding  After you are monitored for several hours, you may go home or may need to stay in the hospital overnight  What do I need to know before I go home? · Care for your wound as directed  Remove the initial bandage in 24 hours or as directed  Mild bruising is normal and expected  The provided Nexcare bandage can be placed on your wound after you remove the initial bandage  Do not put powders, lotions, or creams on your wound  They may cause your wound to get infected  Monitor your wound every day for signs of infection, such as redness, swelling, or pus  · Shower the day after your procedure  Remove the initial bandage before you shower  Do not take baths or go in hot tubs or pools  Carefully wash the wound with soap and water  Pat the area dry  The provided Nexcare bandage can be placed on your wound after you shower  You will replace this bandage after each shower for 4 days  · Apply firm, steady pressure to your wound if it bleeds    Apply pressure with a clean gauze or towel for 5 to 10 minutes  Call 911 if bleeding becomes heavy or does not stop  · Drink liquids as directed  Liquids will help flush the contrast liquid from your body  Ask how much liquid to drink each day and which liquids are best for you  · Do not lift anything heavier than a gallon of milk for 1 week  Heavy lifting can put stress on your wound and cause bleeding  Do not push or pull with the arm that was used for the procedure  Do not do vigorous activity for at least 48 hours  Vigorous activity may cause bleeding from your wound  Rest and do quiet activities  Take short walks around the house to prevent a blood clot  You may return to your normal activities after 1 week  · Do not drive for 48 hours  Your healthcare provider may tell you to wait 48 hours before you drive to decrease your risk for bleeding  You may not be able to return to work for at least 2 days after your procedure if your job involves heavy lifting  What medicines may I need? You may need any of the following:    · Acetaminophen  helps decrease pain and fever  This medicine is available without a doctor's order  Ask how much medicine is safe to take, and how often to take it  Acetaminophen can cause liver damage if not taken correctly  · Take your medicine as directed  Contact your healthcare provider if you think your medicine is not helping or if you have side effects  Tell him or her if you are allergic to any medicine  Keep a list of the medicines, vitamins, and herbs you take  Include the amounts, and when and why you take them  Bring the list or the pill bottles to follow-up visits  Carry your medicine list with you in case of an emergency      Call 911 for any of the following:   · You have any of the following signs of a heart attack:      ¨ Squeezing, pressure, or pain in your chest that lasts longer than 5 minutes or returns    ¨ Discomfort or pain in your back, neck, jaw, stomach, or arm     ¨ Trouble breathing    ¨ Nausea or vomiting    ¨ Lightheadedness or a sudden cold sweat, especially with chest pain or trouble breathing    · You have any of the following signs of a stroke:      ¨ Numbness or drooping on one side of your face     ¨ Weakness in an arm or leg    ¨ Confusion or difficulty speaking    ¨ Dizziness, a severe headache, or vision loss    · You feel lightheaded, short of breath, and have chest pain  · You cough up blood  · You have trouble breathing  · You cannot stop the bleeding from your wound even after you hold firm pressure for 10 minutes  When should I seek immediate care? · Blood soaks through your bandage  · Your hand or arm feels numb, cool, or looks pale  · Your wound gets swollen quickly  When should I contact my healthcare provider? · You have a fever or chills  · Your wound is red, swollen, or draining pus  · Your wound looks more bruised or you have new bruising on the side of your wrist      · You have nausea or are vomiting  · Your skin is itchy, swollen, or you have a rash  · You have questions or concerns about your condition or care  CARE AGREEMENT:   You have the right to help plan your care  Learn about your health condition and how it may be treated  Discuss treatment options with your caregivers to decide what care you want to receive  You always have the right to refuse treatment  The above information is an  only  It is not intended as medical advice for individual conditions or treatments  Talk to your doctor, nurse or pharmacist before following any medical regimen to see if it is safe and effective for you  © 2017 [employer]  Information is for End User's use only and may not be sold, redistributed or otherwise used for commercial purposes  All illustrations and images included in CareNotes® are the copyrighted property of [employer]  or [employer]

## 2020-03-10 NOTE — PROGRESS NOTES
Patient transported to [facility], report given to  RN, care assumed  Assessed R radial puncture site with RN, site is clean, dry and intact with no signs or symptoms of bleeding or hematoma  Cap refill is brisk  TR band 12cc air  Patient denies any chest pain at this time  VSS

## 2020-03-10 NOTE — H&P
74-year-old male with hypertension, hyperlipidemia, coronary artery stenosis was seen in Cardiology Clinic for evaluation of chest pain  Patient was also having dyspnea on exertion  Echocardiogram showed LVEF 55% cannot rule out regional wall motion abnormality, mild MR, mild AI mild-to-moderate TR  Patient underwent Lexiscan MPI stress test which showed moderate ischemia in inferior wall with partial reversibility with regional wall motion abnormality  Cardiac catheterization is scheduled for further evaluation  Patient denies any allergy to contrast   No upcoming surgeries  Physical examination:  General:  Obese,aaox3, not in distress  Neck: supple, no JVD  Eyes:  conjunctiva normal  Lungs:  Bilateral air entry positive, no wheeze/rhonchi or crackle  Heart:  S1-S2 normal, no murmur  Abdomen:  Soft ,nondistended ,nontender, bowel sounds positive  Extremities:  Trace leg edema, no deformity  Neuro:  Moving all extremities, speech clear  Skin: warm, no rash  Risks(including vascular injury, stroke, heart attack and death), benefits and alternatives of cardiac catheterization including possible coronary intervention explained to the patient    Patient understands and agrees

## 2020-04-13 ENCOUNTER — TELEMEDICINE (OUTPATIENT)
Dept: CARDIOLOGY CLINIC | Facility: CLINIC | Age: 70
End: 2020-04-13
Payer: MEDICARE

## 2020-04-13 VITALS
DIASTOLIC BLOOD PRESSURE: 75 MMHG | HEIGHT: 72 IN | WEIGHT: 160 LBS | SYSTOLIC BLOOD PRESSURE: 123 MMHG | BODY MASS INDEX: 21.67 KG/M2

## 2020-04-13 DIAGNOSIS — I36.1 NONRHEUMATIC TRICUSPID VALVE REGURGITATION: ICD-10-CM

## 2020-04-13 DIAGNOSIS — E78.2 MIXED HYPERLIPIDEMIA: ICD-10-CM

## 2020-04-13 DIAGNOSIS — I65.22 STENOSIS OF LEFT CAROTID ARTERY: ICD-10-CM

## 2020-04-13 DIAGNOSIS — I10 ESSENTIAL HYPERTENSION: ICD-10-CM

## 2020-04-13 DIAGNOSIS — R06.00 DYSPNEA ON EXERTION: Primary | ICD-10-CM

## 2020-04-13 PROCEDURE — 99442 PR PHYS/QHP TELEPHONE EVALUATION 11-20 MIN: CPT | Performed by: INTERNAL MEDICINE

## 2020-04-13 RX ORDER — FUROSEMIDE 20 MG/1
20 TABLET ORAL 2 TIMES DAILY
Qty: 30 TABLET | Refills: 3 | Status: SHIPPED | OUTPATIENT
Start: 2020-04-13 | End: 2020-04-21

## 2020-04-13 RX ORDER — LOTEPREDNOL ETABONATE 3.8 MG/G
GEL OPHTHALMIC
COMMUNITY
Start: 2020-02-25 | End: 2021-11-01

## 2020-04-20 DIAGNOSIS — R07.2 PRECORDIAL CHEST PAIN: ICD-10-CM

## 2020-04-21 DIAGNOSIS — R06.00 DYSPNEA ON EXERTION: ICD-10-CM

## 2020-04-21 RX ORDER — FUROSEMIDE 20 MG/1
TABLET ORAL
Qty: 30 TABLET | Refills: 3 | Status: SHIPPED | OUTPATIENT
Start: 2020-04-21 | End: 2020-05-08

## 2020-04-24 ENCOUNTER — TELEMEDICINE (OUTPATIENT)
Dept: FAMILY MEDICINE CLINIC | Facility: CLINIC | Age: 70
End: 2020-04-24
Payer: MEDICARE

## 2020-04-24 VITALS
WEIGHT: 160 LBS | SYSTOLIC BLOOD PRESSURE: 141 MMHG | DIASTOLIC BLOOD PRESSURE: 82 MMHG | HEIGHT: 72 IN | BODY MASS INDEX: 21.67 KG/M2

## 2020-04-24 DIAGNOSIS — E78.2 MIXED HYPERLIPIDEMIA: ICD-10-CM

## 2020-04-24 DIAGNOSIS — R06.02 SHORTNESS OF BREATH: Primary | ICD-10-CM

## 2020-04-24 DIAGNOSIS — I10 ESSENTIAL HYPERTENSION: ICD-10-CM

## 2020-04-24 PROCEDURE — 99213 OFFICE O/P EST LOW 20 MIN: CPT | Performed by: FAMILY MEDICINE

## 2020-04-24 PROCEDURE — 1160F RVW MEDS BY RX/DR IN RCRD: CPT | Performed by: FAMILY MEDICINE

## 2020-05-08 DIAGNOSIS — R06.00 DYSPNEA ON EXERTION: ICD-10-CM

## 2020-05-08 RX ORDER — FUROSEMIDE 20 MG/1
TABLET ORAL
Qty: 30 TABLET | Refills: 3 | Status: SHIPPED | OUTPATIENT
Start: 2020-05-08 | End: 2020-05-18

## 2020-05-11 ENCOUNTER — TELEPHONE (OUTPATIENT)
Dept: FAMILY MEDICINE CLINIC | Facility: CLINIC | Age: 70
End: 2020-05-11

## 2020-05-11 ENCOUNTER — HOSPITAL ENCOUNTER (OUTPATIENT)
Dept: CT IMAGING | Facility: HOSPITAL | Age: 70
Discharge: HOME | End: 2020-05-11
Attending: FAMILY MEDICINE
Payer: MEDICARE

## 2020-05-11 DIAGNOSIS — R06.02 SHORTNESS OF BREATH: Primary | ICD-10-CM

## 2020-05-11 DIAGNOSIS — R06.02 SHORTNESS OF BREATH: ICD-10-CM

## 2020-05-11 PROCEDURE — 71250 CT THORAX DX C-: CPT

## 2020-05-17 DIAGNOSIS — R06.00 DYSPNEA ON EXERTION: ICD-10-CM

## 2020-05-18 DIAGNOSIS — I10 ESSENTIAL HYPERTENSION: ICD-10-CM

## 2020-05-18 RX ORDER — QUINAPRIL 10 MG/1
TABLET ORAL
Qty: 90 TABLET | Refills: 3 | Status: SHIPPED | OUTPATIENT
Start: 2020-05-18 | End: 2020-06-13 | Stop reason: SDUPTHER

## 2020-05-18 RX ORDER — FUROSEMIDE 20 MG/1
TABLET ORAL
Qty: 180 TABLET | Refills: 1 | Status: SHIPPED | OUTPATIENT
Start: 2020-05-18 | End: 2020-10-23

## 2020-05-29 ENCOUNTER — TELEMEDICINE (OUTPATIENT)
Dept: UROLOGY | Facility: CLINIC | Age: 70
End: 2020-05-29
Payer: MEDICARE

## 2020-05-29 DIAGNOSIS — N43.2 OTHER HYDROCELE: ICD-10-CM

## 2020-05-29 DIAGNOSIS — N13.8 BPH WITH OBSTRUCTION/LOWER URINARY TRACT SYMPTOMS: Primary | ICD-10-CM

## 2020-05-29 DIAGNOSIS — N40.1 BPH WITH OBSTRUCTION/LOWER URINARY TRACT SYMPTOMS: Primary | ICD-10-CM

## 2020-05-29 DIAGNOSIS — R36.1 HEMATOSPERMIA: ICD-10-CM

## 2020-05-29 PROBLEM — N50.89 SCROTAL SWELLING: Status: ACTIVE | Noted: 2020-05-29

## 2020-05-29 PROCEDURE — 99213 OFFICE O/P EST LOW 20 MIN: CPT | Performed by: PHYSICIAN ASSISTANT

## 2020-05-29 RX ORDER — OXYBUTYNIN CHLORIDE 10 MG/1
10 TABLET, EXTENDED RELEASE ORAL
Qty: 30 TABLET | Refills: 3 | Status: SHIPPED | OUTPATIENT
Start: 2020-05-29 | End: 2020-08-21

## 2020-06-13 DIAGNOSIS — I10 ESSENTIAL HYPERTENSION: ICD-10-CM

## 2020-06-13 RX ORDER — QUINAPRIL 10 MG/1
10 TABLET ORAL DAILY
Qty: 90 TABLET | Refills: 3 | Status: SHIPPED | OUTPATIENT
Start: 2020-06-13 | End: 2021-05-31 | Stop reason: SDUPTHER

## 2020-06-15 ENCOUNTER — CONSULT (OUTPATIENT)
Dept: PULMONOLOGY | Facility: CLINIC | Age: 70
End: 2020-06-15
Payer: MEDICARE

## 2020-06-15 VITALS
OXYGEN SATURATION: 94 % | DIASTOLIC BLOOD PRESSURE: 80 MMHG | BODY MASS INDEX: 36.16 KG/M2 | SYSTOLIC BLOOD PRESSURE: 124 MMHG | WEIGHT: 267 LBS | TEMPERATURE: 97.5 F | HEIGHT: 72 IN | HEART RATE: 98 BPM

## 2020-06-15 DIAGNOSIS — E66.9 OBESITY (BMI 30-39.9): ICD-10-CM

## 2020-06-15 DIAGNOSIS — R06.02 SOB (SHORTNESS OF BREATH): Primary | ICD-10-CM

## 2020-06-15 DIAGNOSIS — J41.0 SIMPLE CHRONIC BRONCHITIS (HCC): ICD-10-CM

## 2020-06-15 PROCEDURE — 3008F BODY MASS INDEX DOCD: CPT | Performed by: INTERNAL MEDICINE

## 2020-06-15 PROCEDURE — 99204 OFFICE O/P NEW MOD 45 MIN: CPT | Performed by: INTERNAL MEDICINE

## 2020-06-15 PROCEDURE — 4040F PNEUMOC VAC/ADMIN/RCVD: CPT | Performed by: INTERNAL MEDICINE

## 2020-06-15 PROCEDURE — 3079F DIAST BP 80-89 MM HG: CPT | Performed by: INTERNAL MEDICINE

## 2020-06-15 PROCEDURE — 1036F TOBACCO NON-USER: CPT | Performed by: INTERNAL MEDICINE

## 2020-06-15 PROCEDURE — 1160F RVW MEDS BY RX/DR IN RCRD: CPT | Performed by: INTERNAL MEDICINE

## 2020-06-15 PROCEDURE — 3074F SYST BP LT 130 MM HG: CPT | Performed by: INTERNAL MEDICINE

## 2020-06-29 DIAGNOSIS — R06.02 SOB (SHORTNESS OF BREATH): ICD-10-CM

## 2020-06-29 PROCEDURE — U0003 INFECTIOUS AGENT DETECTION BY NUCLEIC ACID (DNA OR RNA); SEVERE ACUTE RESPIRATORY SYNDROME CORONAVIRUS 2 (SARS-COV-2) (CORONAVIRUS DISEASE [COVID-19]), AMPLIFIED PROBE TECHNIQUE, MAKING USE OF HIGH THROUGHPUT TECHNOLOGIES AS DESCRIBED BY CMS-2020-01-R: HCPCS

## 2020-07-01 LAB — SARS-COV-2 RNA SPEC QL NAA+PROBE: NOT DETECTED

## 2020-07-06 ENCOUNTER — APPOINTMENT (OUTPATIENT)
Dept: LAB | Facility: HOSPITAL | Age: 70
End: 2020-07-06
Attending: INTERNAL MEDICINE
Payer: MEDICARE

## 2020-07-06 ENCOUNTER — HOSPITAL ENCOUNTER (OUTPATIENT)
Dept: PULMONOLOGY | Facility: HOSPITAL | Age: 70
Discharge: HOME | End: 2020-07-06
Attending: INTERNAL MEDICINE
Payer: MEDICARE

## 2020-07-06 DIAGNOSIS — R06.02 SOB (SHORTNESS OF BREATH): ICD-10-CM

## 2020-07-06 DIAGNOSIS — R06.00 DYSPNEA ON EXERTION: ICD-10-CM

## 2020-07-06 LAB
ANION GAP SERPL CALCULATED.3IONS-SCNC: 10 MMOL/L (ref 4–13)
BUN SERPL-MCNC: 15 MG/DL (ref 5–25)
CALCIUM SERPL-MCNC: 8.6 MG/DL (ref 8.3–10.1)
CHLORIDE SERPL-SCNC: 110 MMOL/L (ref 100–108)
CO2 SERPL-SCNC: 26 MMOL/L (ref 21–32)
CREAT SERPL-MCNC: 1.18 MG/DL (ref 0.6–1.3)
GFR SERPL CREATININE-BSD FRML MDRD: 62 ML/MIN/1.73SQ M
GLUCOSE P FAST SERPL-MCNC: 114 MG/DL (ref 65–99)
POTASSIUM SERPL-SCNC: 4.4 MMOL/L (ref 3.5–5.3)
SODIUM SERPL-SCNC: 146 MMOL/L (ref 136–145)

## 2020-07-06 PROCEDURE — 94729 DIFFUSING CAPACITY: CPT | Performed by: INTERNAL MEDICINE

## 2020-07-06 PROCEDURE — 94727 GAS DIL/WSHOT DETER LNG VOL: CPT

## 2020-07-06 PROCEDURE — 80048 BASIC METABOLIC PNL TOTAL CA: CPT

## 2020-07-06 PROCEDURE — 94760 N-INVAS EAR/PLS OXIMETRY 1: CPT

## 2020-07-06 PROCEDURE — 94727 GAS DIL/WSHOT DETER LNG VOL: CPT | Performed by: INTERNAL MEDICINE

## 2020-07-06 PROCEDURE — 94010 BREATHING CAPACITY TEST: CPT

## 2020-07-06 PROCEDURE — 94010 BREATHING CAPACITY TEST: CPT | Performed by: INTERNAL MEDICINE

## 2020-07-06 PROCEDURE — 36415 COLL VENOUS BLD VENIPUNCTURE: CPT

## 2020-07-06 PROCEDURE — 94729 DIFFUSING CAPACITY: CPT

## 2020-07-06 PROCEDURE — 86003 ALLG SPEC IGE CRUDE XTRC EA: CPT

## 2020-07-06 PROCEDURE — 82785 ASSAY OF IGE: CPT

## 2020-07-07 LAB
A ALTERNATA IGE QN: <0.1 KUA/I
A FUMIGATUS IGE QN: <0.1 KUA/I
ALLERGEN COMMENT: ABNORMAL
BERMUDA GRASS IGE QN: <0.1 KUA/I
BOXELDER IGE QN: <0.1 KUA/I
C HERBARUM IGE QN: <0.1 KUA/I
CAT DANDER IGE QN: <0.1 KUA/I
CMN PIGWEED IGE QN: <0.1 KUA/I
COMMON RAGWEED IGE QN: <0.1 KUA/I
COTTONWOOD IGE QN: <0.1 KUA/I
D FARINAE IGE QN: <0.1 KUA/I
D PTERONYSS IGE QN: <0.1 KUA/I
DOG DANDER IGE QN: <0.1 KUA/I
LONDON PLANE IGE QN: <0.1 KUA/I
MOUSE URINE PROT IGE QN: <0.1 KUA/I
MT JUNIPER IGE QN: 0.19 KUA/I
MUGWORT IGE QN: <0.1 KUA/I
P NOTATUM IGE QN: <0.1 KUA/I
ROACH IGE QN: 0.12 KUA/I
SHEEP SORREL IGE QN: <0.1 KUA/I
SILVER BIRCH IGE QN: <0.1 KUA/I
TIMOTHY IGE QN: <0.1 KUA/I
TOTAL IGE SMQN RAST: 49.4 KU/L (ref 0–113)
WALNUT IGE QN: <0.1 KUA/I
WHITE ASH IGE QN: <0.1 KUA/I
WHITE ELM IGE QN: <0.1 KUA/I
WHITE MULBERRY IGE QN: <0.1 KUA/I
WHITE OAK IGE QN: <0.1 KUA/I

## 2020-07-21 ENCOUNTER — TELEPHONE (OUTPATIENT)
Dept: PULMONOLOGY | Facility: CLINIC | Age: 70
End: 2020-07-21

## 2020-07-22 ENCOUNTER — OFFICE VISIT (OUTPATIENT)
Dept: PULMONOLOGY | Facility: CLINIC | Age: 70
End: 2020-07-22
Payer: MEDICARE

## 2020-07-22 VITALS
HEART RATE: 82 BPM | BODY MASS INDEX: 36.57 KG/M2 | DIASTOLIC BLOOD PRESSURE: 80 MMHG | TEMPERATURE: 98 F | SYSTOLIC BLOOD PRESSURE: 138 MMHG | WEIGHT: 270 LBS | HEIGHT: 72 IN | OXYGEN SATURATION: 95 %

## 2020-07-22 DIAGNOSIS — Z87.891 FORMER SMOKER: ICD-10-CM

## 2020-07-22 DIAGNOSIS — R06.02 SOB (SHORTNESS OF BREATH): Primary | ICD-10-CM

## 2020-07-22 DIAGNOSIS — E66.9 OBESITY (BMI 30-39.9): ICD-10-CM

## 2020-07-22 DIAGNOSIS — J41.0 SIMPLE CHRONIC BRONCHITIS (HCC): ICD-10-CM

## 2020-07-22 PROCEDURE — 3008F BODY MASS INDEX DOCD: CPT | Performed by: FAMILY MEDICINE

## 2020-07-22 PROCEDURE — 1160F RVW MEDS BY RX/DR IN RCRD: CPT | Performed by: INTERNAL MEDICINE

## 2020-07-22 PROCEDURE — 3075F SYST BP GE 130 - 139MM HG: CPT | Performed by: INTERNAL MEDICINE

## 2020-07-22 PROCEDURE — 4040F PNEUMOC VAC/ADMIN/RCVD: CPT | Performed by: INTERNAL MEDICINE

## 2020-07-22 PROCEDURE — 1036F TOBACCO NON-USER: CPT | Performed by: INTERNAL MEDICINE

## 2020-07-22 PROCEDURE — 3079F DIAST BP 80-89 MM HG: CPT | Performed by: INTERNAL MEDICINE

## 2020-07-22 PROCEDURE — 3008F BODY MASS INDEX DOCD: CPT | Performed by: INTERNAL MEDICINE

## 2020-07-22 PROCEDURE — 99214 OFFICE O/P EST MOD 30 MIN: CPT | Performed by: INTERNAL MEDICINE

## 2020-07-22 RX ORDER — ALBUTEROL SULFATE 90 UG/1
2 AEROSOL, METERED RESPIRATORY (INHALATION) EVERY 6 HOURS PRN
Qty: 18 G | Refills: 1 | Status: SHIPPED | OUTPATIENT
Start: 2020-07-22 | End: 2022-04-18

## 2020-07-22 NOTE — PROGRESS NOTES
Assessment/Plan:   Diagnoses and all orders for this visit:    SOB (shortness of breath)    Simple chronic bronchitis (HCC)  -     albuterol (Ventolin HFA) 90 mcg/act inhaler; Inhale 2 puffs every 6 (six) hours as needed for wheezing    Obesity (BMI 30-39  9)    Former smoker  -     CT lung screening program; Future      shortness of breath and dyspnea on exertion in this 26-year-old gentleman with greater than 30 pack-year smoking history who quit about 14 years ago, is likely multifactorial secondary to chronic bronchitis/COPD with likely asthma overlap with the allergies, and also likely secondary to his obesity  PFT with post bronchodilator lung volumes and DLCO with mild restrictive pattern, and mildly decreased DLCO  FEV1 was 77% post bronchodilator response was not performed  CT of the chest done May 2020 demonstrating stable micro atelectasis in the lingular otherwise normal  CT lung screening in 1 year  He also had complete workup done cardiac workup done which has all been negative  Will give albuterol MDI 2 puffs 4 times daily as needed  MDI technique reviewed with the patient  He will use the Ventolin MDI 2 puffs 4 times daily as needed  He states he does not have any symptoms related to sleep disordered breathing  [place] sleepiness score of 5, discussed consequences of untreated sleep apnea and long discussion with the patient with regards to weight loss understands and verbalizes  Flu shot in September 2020 up-to-date with pneumonia vaccine  I will see him back in 1 year or p r n  earlier as needed  Recommend weight loss    Return in about 1 year (around 7/22/2021)  All questions are answered to the patient's satisfaction and understanding  He verbalizes understanding  He is encouraged to call with any further questions or concerns  Portions of the record may have been created with voice recognition software    Occasional wrong word or "sound a like" substitutions may have occurred due to the inherent limitations of voice recognition software  Read the chart carefully and recognize, using context, where substitutions have occurred  Electronically Signed by Darryle Breaker, MD    ______________________________________________________________________    Chief Complaint:   Chief Complaint   Patient presents with    Follow-up     PFT       Patient ID: Eliu Irizarry is a 69 y o  y o  male has a past medical history of Cardiac disease, Fungal dermatitis, Hyperlipidemia, Hypertension, MRSA (methicillin resistant Staphylococcus aureus) infection, Post traumatic stress disorder (PTSD), and Psychiatric disorder  7/22/2020  Patient presents today for follow-up visit  Patient is a very pleasant 44-year-old gentleman with greater than 30 pack-year smoker history, who quit about 14 years ago he states, in December of 2019 he developed chest tightness shortness of breath and wheezing and he states since then on and off has been having some episodes of cough and wheezing       Review of Systems   Constitutional: Positive for unexpected weight change  Negative for appetite change, chills, diaphoresis, fatigue and fever  HENT: Positive for postnasal drip  Negative for congestion, ear discharge, ear pain, nosebleeds, rhinorrhea, sinus pain, sore throat and voice change  Eyes: Negative for pain, discharge and visual disturbance  Respiratory: Positive for shortness of breath  Negative for apnea, cough, choking, chest tightness, wheezing and stridor  Cardiovascular: Negative for chest pain, palpitations and leg swelling  Gastrointestinal: Negative for abdominal pain, blood in stool, constipation, diarrhea and vomiting  Endocrine: Negative for cold intolerance, heat intolerance, polydipsia, polyphagia and polyuria  Genitourinary: Negative for difficulty urinating and dysuria  Musculoskeletal: Negative for arthralgias and neck pain  Skin: Negative for pallor and rash     Allergic/Immunologic: Negative for environmental allergies and food allergies  Neurological: Negative for dizziness, speech difficulty, weakness and light-headedness  Hematological: Negative for adenopathy  Does not bruise/bleed easily  Psychiatric/Behavioral: Negative for agitation, confusion and sleep disturbance  The patient is not nervous/anxious  Smoking history: He reports that he has quit smoking  His smoking use included cigarettes  He quit after 40 00 years of use  He has quit using smokeless tobacco     The following portions of the patient's history were reviewed and updated as appropriate: allergies, current medications, past family history, past medical history, past social history, past surgical history and problem list     Immunization History   Administered Date(s) Administered    INFLUENZA 03/27/2014, 10/15/2018    Influenza Quadrivalent Preservative Free 3 years and older IM 10/04/2016    Influenza Split High Dose Preservative Free IM 10/04/2017    Influenza, high dose seasonal 0 5 mL 09/16/2019    Pneumococcal Conjugate 13-Valent 10/25/2019    Pneumococcal Polysaccharide PPV23 10/17/2018     Current Outpatient Medications   Medication Sig Dispense Refill    oxybutynin (DITROPAN-XL) 10 MG 24 hr tablet Take 1 tablet (10 mg total) by mouth daily at bedtime 30 tablet 3    quinapril (ACCUPRIL) 10 mg tablet Take 1 tablet (10 mg total) by mouth daily 90 tablet 3    RESTASIS 0 05 % ophthalmic emulsion INSTILL 1 DROP INTO BOTH EYES TWICE A DAY  3    simvastatin (ZOCOR) 80 mg tablet Take 80 mg by mouth daily at bedtime   traZODone (DESYREL) 50 mg tablet Take 150 mg by mouth daily at bedtime        albuterol (Ventolin HFA) 90 mcg/act inhaler Inhale 2 puffs every 6 (six) hours as needed for wheezing 18 g 1    furosemide (LASIX) 20 mg tablet TAKE 1 TABLET BY MOUTH TWICE A DAY (Patient not taking: Reported on 5/29/2020) 180 tablet 1    LOTEMAX SM 0 38 % GEL INSTILL 1 DROP INTO BOTH EYES TWICE A DAY      tamsulosin (FLOMAX) 0 4 mg Take 1 capsule (0 4 mg total) by mouth daily with dinner (Patient not taking: Reported on 5/29/2020) 30 capsule 10     No current facility-administered medications for this visit  Allergies: Morphine and related    Objective:  Vitals:    07/22/20 0856   BP: 138/80   Pulse: 82   Temp: 98 °F (36 7 °C)   SpO2: 95%   Weight: 122 kg (270 lb)   Height: 6' (1 829 m)   Oxygen Therapy  SpO2: 95 %    Wt Readings from Last 3 Encounters:   07/22/20 122 kg (270 lb)   06/15/20 121 kg (267 lb)   04/24/20 72 6 kg (160 lb)     Body mass index is 36 62 kg/m²  Physical Exam   Constitutional: He is oriented to person, place, and time  He appears well-developed and well-nourished  HENT:   Head: Normocephalic and atraumatic  Crowded oropharyngeal airways, Mallampati score 3   Eyes: Pupils are equal, round, and reactive to light  EOM are normal    Neck: Normal range of motion  Neck supple  Short and wide neck   Cardiovascular: Normal rate, regular rhythm and normal heart sounds  Pulmonary/Chest: Effort normal and breath sounds normal    Musculoskeletal: Normal range of motion  Neurological: He is alert and oriented to person, place, and time  Skin: Skin is warm and dry  Psychiatric: He has a normal mood and affect   His behavior is normal          Diagnostics:  I have personally reviewed pertinent films in PACS  ESS: Total score: 5

## 2020-08-21 DIAGNOSIS — N40.1 BPH WITH OBSTRUCTION/LOWER URINARY TRACT SYMPTOMS: ICD-10-CM

## 2020-08-21 DIAGNOSIS — N13.8 BPH WITH OBSTRUCTION/LOWER URINARY TRACT SYMPTOMS: ICD-10-CM

## 2020-08-21 RX ORDER — OXYBUTYNIN CHLORIDE 10 MG/1
TABLET, EXTENDED RELEASE ORAL
Qty: 90 TABLET | Refills: 1 | Status: SHIPPED | OUTPATIENT
Start: 2020-08-21 | End: 2021-02-10

## 2020-09-23 ENCOUNTER — OFFICE VISIT (OUTPATIENT)
Dept: CARDIOLOGY CLINIC | Facility: CLINIC | Age: 70
End: 2020-09-23
Payer: MEDICARE

## 2020-09-23 VITALS
SYSTOLIC BLOOD PRESSURE: 122 MMHG | HEART RATE: 63 BPM | DIASTOLIC BLOOD PRESSURE: 68 MMHG | HEIGHT: 72 IN | WEIGHT: 260 LBS | BODY MASS INDEX: 35.21 KG/M2 | OXYGEN SATURATION: 95 %

## 2020-09-23 DIAGNOSIS — I36.1 NONRHEUMATIC TRICUSPID VALVE REGURGITATION: ICD-10-CM

## 2020-09-23 DIAGNOSIS — I10 ESSENTIAL HYPERTENSION: Primary | ICD-10-CM

## 2020-09-23 DIAGNOSIS — E78.2 MIXED HYPERLIPIDEMIA: ICD-10-CM

## 2020-09-23 PROCEDURE — 99212 OFFICE O/P EST SF 10 MIN: CPT | Performed by: INTERNAL MEDICINE

## 2020-09-23 NOTE — PROGRESS NOTES
[facility] [place]  [address] [facility] [place] [address]  Cardiology Follow Up    Margot Mcbride  1950  [id number]      1  Essential hypertension     2  Mixed hyperlipidemia     3  Nonrheumatic tricuspid valve regurgitation         Chief Complaint   Patient presents with    Follow-up     3 month follow up       Interval History:   76-year-old male with nonobstructive coronaries, tricuspid regurgitation, hypertension, hyperlipidemia, carotid artery disease, ex-smoker presented for follow-up  Patient denies any new complaints since last seen in Cardiology Clinic  He gets shortness of breath mostly when he over exerts especially lifting heavy weights  He does not get any shortness of breath at rest or walking at medium preserved taking stairs  No chest pain, palpitation dizziness, leg edema or loss of consciousness  Patient ran out of Lasix and have not started it as his shortness of breath was not progressing    Patient recently underwent PFTs and noted to have mild restrictive patent with mild lead decreased DLCO 2  He was prescribed inhaler but have not started taking it yet       Current medications reviewed with the patient  Labs reviewed from July 2020  [facility] [place] and will get routine labs done including lipid      Review of Systems:  Review of system negative except as mentioned above    Patient Active Problem List   Diagnosis    Post traumatic stress disorder    Mixed hyperlipidemia    Essential hypertension    Elevated blood sugar    Diarrhea    Multiple allergies    Seborrheic keratosis    Tension headache    Cervicalgia    Stenosis of left carotid artery    Hydrocele    BPH with obstruction/lower urinary tract symptoms    Hematospermia    Scrotal swelling    SOB (shortness of breath)     Past Medical History:   Diagnosis Date    Cardiac disease     Fungal dermatitis     Hyperlipidemia     Hypertension     per [employer]: Essential hypertension ith goal blood pressure less than 130/85;  Resolved:4/20/17    MRSA (methicillin resistant Staphylococcus aureus) infection     Post traumatic stress disorder (PTSD)     Psychiatric disorder      Social History     Socioeconomic History    Marital status: /Civil Union     Spouse name: Not on file    Number of children: Not on file    Years of education: Not on file    Highest education level: Not on file   Occupational History    Occupation: Retired   Social Needs    Financial resource strain: Not on file    Food insecurity     Worry: Not on file     Inability: Not on file   [employer] needs     Medical: Not on file     Non-medical: Not on file   Tobacco Use    Smoking status: Former Smoker     Years: 40 00     Types: Cigarettes    Smokeless tobacco: Former User   Substance and Sexual Activity    Alcohol use: No    Drug use: No    Sexual activity: Not on file   Lifestyle    Physical activity     Days per week: Not on file     Minutes per session: Not on file    Stress: Not on file   Relationships    Social connections     Talks on phone: Not on file     Gets together: Not on file     Attends Holiness service: Not on file     Active member of club or organization: Not on file     Attends meetings of clubs or organizations: Not on file     Relationship status: Not on file    Intimate partner violence     Fear of current or ex partner: Not on file     Emotionally abused: Not on file     Physically abused: Not on file     Forced sexual activity: Not on file   Other Topics Concern    Not on file   Social History Narrative    Daily caffeine consumption      Family History   Problem Relation Age of Onset    Diabetes Mother     Heart disease Father     Coronary artery disease Father     Aortic aneurysm Father         Ruptured abdominal aortic aneurysm    Coronary artery disease Paternal Grandmother     Coronary artery disease Paternal Grandfather      Past Surgical History:   Procedure Laterality Date    CATARACT EXTRACTION      CHOLECYSTECTOMY      CHOLECYSTECTOMY LAPAROSCOPIC N/A 3/7/2016    Procedure: CHOLECYSTECTOMY LAPAROSCOPIC;  Surgeon: Anthony Perry DO;  [facility];  Service:     COLONOSCOPY      HERNIA REPAIR N/A unknown, supraumbilical    JOINT REPLACEMENT Bilateral     hips and knees    OR COLONOSCOPY FLX DX W/COLLJ SPEC WHEN PFRMD N/A 1/9/2018    Procedure: COLONOSCOPY;  Surgeon: April Linda MD;  Location: [facility]; Service: Gastroenterology    TOTAL HIP ARTHROPLASTY Bilateral 2009, 2010    TOTAL KNEE ARTHROPLASTY Bilateral 2014       Current Outpatient Medications:     omeprazole (PriLOSEC) 20 mg delayed release capsule, Take 1 capsule (20 mg total) by mouth 2 (two) times a day before meals, Disp: 60 capsule, Rfl: 5    oxybutynin (DITROPAN-XL) 10 MG 24 hr tablet, TAKE 1 TABLET BY MOUTH DAILY AT BEDTIME, Disp: 90 tablet, Rfl: 1    quinapril (ACCUPRIL) 10 mg tablet, Take 1 tablet (10 mg total) by mouth daily, Disp: 90 tablet, Rfl: 3    RESTASIS 0 05 % ophthalmic emulsion, INSTILL 1 DROP INTO BOTH EYES TWICE A DAY, Disp: , Rfl: 3    simvastatin (ZOCOR) 80 mg tablet, Take 80 mg by mouth daily at bedtime  , Disp: , Rfl:     traZODone (DESYREL) 50 mg tablet, Take 150 mg by mouth daily at bedtime  , Disp: , Rfl:     albuterol (Ventolin HFA) 90 mcg/act inhaler, Inhale 2 puffs every 6 (six) hours as needed for wheezing (Patient not taking: Reported on 9/23/2020), Disp: 18 g, Rfl: 1    furosemide (LASIX) 20 mg tablet, TAKE 1 TABLET BY MOUTH TWICE A DAY (Patient not taking: Reported on 5/29/2020), Disp: 180 tablet, Rfl: 1    LOTEMAX SM 0 38 % GEL, INSTILL 1 DROP INTO BOTH EYES TWICE A DAY, Disp: , Rfl:     tamsulosin (FLOMAX) 0 4 mg, Take 1 capsule (0 4 mg total) by mouth daily with dinner (Patient not taking: Reported on 5/29/2020), Disp: 30 capsule, Rfl: 10  Allergies   Allergen Reactions    Morphine And Related GI Intolerance Labs:  Appointment on 07/06/2020   Component Date Value    Sodium 07/06/2020 146*    Potassium 07/06/2020 4 4     Chloride 07/06/2020 110*    CO2 07/06/2020 26     ANION GAP 07/06/2020 10     BUN 07/06/2020 15     Creatinine 07/06/2020 1 18     Glucose, Fasting 07/06/2020 114*    Calcium 07/06/2020 8 6     eGFR 07/06/2020 62     A  ALTERNATA 07/06/2020 <0 10     A  FUMIGATUS 07/06/2020 <0 10     Bermuda Grass 07/06/2020 <0 10     Box Elder  07/06/2020 <0 10     Cat Epithellium-Dander 07/06/2020 <0 10     C  HERBARUM 07/06/2020 <0 10     Cockroach 07/06/2020 0 12*    Common Silver Lubertha Sportsman 07/06/2020 <0 10     [place] 07/06/2020 <0 10     D  farinae 07/06/2020 <0 10     D  pteronyssinus 07/06/2020 <0 10     Dog Dander 07/06/2020 <0 10     Elm IgE 07/06/2020 <0 10     Mountain Hardy Tree 07/06/2020 0 19*    Mugwort 07/06/2020 <0 10     [place] Tree 07/06/2020 <0 10     Oak 07/06/2020 <0 10     P CHRYSOGENUM 07/06/2020 <0 10     Rough Pigweed  IgE 07/06/2020 <0 10     Common Ragweed 07/06/2020 <0 10     Sheep Gerald IgE 07/06/2020 <0 10     [place] Tree 07/06/2020 <0 10     Ari Grass 07/06/2020 <0 10     Shiro Tree 07/06/2020 <0 10     White [place] Tree 07/06/2020 <0 10     IgE 07/06/2020 49 4     Allergen Comment 07/06/2020 See Below     MOUSE URINE 07/06/2020 <0 10    Orders Only on 06/29/2020   Component Date Value    SARS-CoV-2  06/29/2020 Not Detected      Imaging: No results found      Physical Exam:  General:  obese, awake, alert and oriented x3, not in distress  Neck: supple, no JVD  Eyes: PERRL, conjunctiva normal  Lungs:  Bilateral air entry positive, no wheeze/rhonchi or crackle  Heart:  S1-S2 normal, no murmur  Abdomen:  Soft ,nondistended ,nontender, bowel sounds positive  Extremities:  No leg edema, no deformity, ROM normal  Neuro:  Moving all extremities, speech clear  Skin: warm, no rash    /68   Pulse 63   Ht 6' (1 829 m)   Wt 118 kg (260 lb) SpO2 95%   BMI 35 26 kg/m²     Cardiographics :  Feb 2020  Echocardiogram showed LVEF 55% without regional wall motion abnormality, grade 1 diastolic dysfunction, mild MR, mild AI, moderate TR with pulmonary artery systolic pressure at upper normal limit  Patient had abnormal MPI stress test for which he underwent cardiac catheterization which showed hemodynamically nonsignificant coronaries with normal LVEDP  Assessment:     1  Dyspnea on exertion  Stable  Patient only gets shortness of breath on lifting heavy weights  Multifactorial could be due to underlying pulmonary disease     3  Mild to moderate tricuspid regurgitation  Resolved     3  Hypertension  It is controlled     3  Hyperlipidemia  On Zocor 80 mg     4  Carotid artery stenosis  Patient denies any dizziness or syncope   Carotid duplex in October of 2019 showed carotid stenosis between 1-49%     5  Ex-smoker     Recommendations:  Patient is doing well from cardiology standpoint  He was advised to start taking Lasix if worsening shortness of breath or as needed  He should continue his current to include quinapril including simvastatin  Patient to take inhalers as prescribed by Pulmonary  Patient to follow-up with [address] [place] [address] for routine labs including lipids  Above all discussed with patient  Carol Ann Colome understands and agrees  He will need a follow-up echo on next visit to evaluate tricuspid regurgitation  Return to clinic in 1 year or early if needed  Above all discussed with patient    Patient understands and agrees

## 2020-10-07 ENCOUNTER — TELEPHONE (OUTPATIENT)
Dept: FAMILY MEDICINE CLINIC | Facility: CLINIC | Age: 70
End: 2020-10-07

## 2020-10-23 ENCOUNTER — OFFICE VISIT (OUTPATIENT)
Dept: FAMILY MEDICINE CLINIC | Facility: CLINIC | Age: 70
End: 2020-10-23
Payer: MEDICARE

## 2020-10-23 VITALS
OXYGEN SATURATION: 94 % | HEART RATE: 68 BPM | SYSTOLIC BLOOD PRESSURE: 120 MMHG | HEIGHT: 72 IN | WEIGHT: 260 LBS | BODY MASS INDEX: 35.21 KG/M2 | RESPIRATION RATE: 14 BRPM | DIASTOLIC BLOOD PRESSURE: 80 MMHG | TEMPERATURE: 97.6 F

## 2020-10-23 DIAGNOSIS — N40.1 BPH WITH OBSTRUCTION/LOWER URINARY TRACT SYMPTOMS: ICD-10-CM

## 2020-10-23 DIAGNOSIS — I65.22 STENOSIS OF LEFT CAROTID ARTERY: ICD-10-CM

## 2020-10-23 DIAGNOSIS — N13.8 BPH WITH OBSTRUCTION/LOWER URINARY TRACT SYMPTOMS: ICD-10-CM

## 2020-10-23 DIAGNOSIS — F43.10 POST TRAUMATIC STRESS DISORDER: ICD-10-CM

## 2020-10-23 DIAGNOSIS — Z00.00 MEDICARE ANNUAL WELLNESS VISIT, SUBSEQUENT: ICD-10-CM

## 2020-10-23 DIAGNOSIS — I10 ESSENTIAL HYPERTENSION: ICD-10-CM

## 2020-10-23 DIAGNOSIS — K21.9 GASTROESOPHAGEAL REFLUX DISEASE WITHOUT ESOPHAGITIS: ICD-10-CM

## 2020-10-23 DIAGNOSIS — Z23 NEED FOR INFLUENZA VACCINATION: ICD-10-CM

## 2020-10-23 DIAGNOSIS — E78.2 MIXED HYPERLIPIDEMIA: ICD-10-CM

## 2020-10-23 DIAGNOSIS — Z20.822 ENCOUNTER FOR SCREENING LABORATORY TESTING FOR COVID-19 VIRUS: Primary | ICD-10-CM

## 2020-10-23 DIAGNOSIS — Z12.5 PROSTATE CANCER SCREENING: ICD-10-CM

## 2020-10-23 DIAGNOSIS — R73.9 ELEVATED BLOOD SUGAR: ICD-10-CM

## 2020-10-23 PROCEDURE — G0439 PPPS, SUBSEQ VISIT: HCPCS | Performed by: FAMILY MEDICINE

## 2020-10-23 PROCEDURE — 90662 IIV NO PRSV INCREASED AG IM: CPT

## 2020-10-23 PROCEDURE — 99214 OFFICE O/P EST MOD 30 MIN: CPT | Performed by: FAMILY MEDICINE

## 2020-10-23 PROCEDURE — G0008 ADMIN INFLUENZA VIRUS VAC: HCPCS

## 2020-10-23 PROCEDURE — 1123F ACP DISCUSS/DSCN MKR DOCD: CPT | Performed by: FAMILY MEDICINE

## 2020-10-27 DIAGNOSIS — Z20.822 ENCOUNTER FOR SCREENING LABORATORY TESTING FOR COVID-19 VIRUS: ICD-10-CM

## 2020-10-27 PROCEDURE — U0003 INFECTIOUS AGENT DETECTION BY NUCLEIC ACID (DNA OR RNA); SEVERE ACUTE RESPIRATORY SYNDROME CORONAVIRUS 2 (SARS-COV-2) (CORONAVIRUS DISEASE [COVID-19]), AMPLIFIED PROBE TECHNIQUE, MAKING USE OF HIGH THROUGHPUT TECHNOLOGIES AS DESCRIBED BY CMS-2020-01-R: HCPCS | Performed by: FAMILY MEDICINE

## 2020-10-28 LAB — SARS-COV-2 RNA SPEC QL NAA+PROBE: NOT DETECTED

## 2020-10-29 ENCOUNTER — TELEPHONE (OUTPATIENT)
Dept: FAMILY MEDICINE CLINIC | Facility: CLINIC | Age: 70
End: 2020-10-29

## 2020-11-25 ENCOUNTER — HOSPITAL ENCOUNTER (OUTPATIENT)
Dept: VASCULAR ULTRASOUND | Facility: HOSPITAL | Age: 70
Discharge: HOME | End: 2020-11-25
Attending: FAMILY MEDICINE
Payer: MEDICARE

## 2020-11-25 DIAGNOSIS — I65.22 STENOSIS OF LEFT CAROTID ARTERY: ICD-10-CM

## 2020-11-25 PROCEDURE — 93880 EXTRACRANIAL BILAT STUDY: CPT | Performed by: SURGERY

## 2020-11-25 PROCEDURE — 93880 EXTRACRANIAL BILAT STUDY: CPT

## 2021-02-10 DIAGNOSIS — N40.1 BPH WITH OBSTRUCTION/LOWER URINARY TRACT SYMPTOMS: ICD-10-CM

## 2021-02-10 DIAGNOSIS — N13.8 BPH WITH OBSTRUCTION/LOWER URINARY TRACT SYMPTOMS: ICD-10-CM

## 2021-02-10 RX ORDER — OXYBUTYNIN CHLORIDE 10 MG/1
TABLET, EXTENDED RELEASE ORAL
Qty: 90 TABLET | Refills: 1 | Status: SHIPPED | OUTPATIENT
Start: 2021-02-10 | End: 2021-04-26

## 2021-03-02 ENCOUNTER — OFFICE VISIT (OUTPATIENT)
Dept: FAMILY MEDICINE CLINIC | Facility: CLINIC | Age: 71
End: 2021-03-02
Payer: MEDICARE

## 2021-03-02 VITALS
HEART RATE: 68 BPM | BODY MASS INDEX: 35.62 KG/M2 | HEIGHT: 72 IN | WEIGHT: 263 LBS | SYSTOLIC BLOOD PRESSURE: 126 MMHG | OXYGEN SATURATION: 97 % | DIASTOLIC BLOOD PRESSURE: 72 MMHG

## 2021-03-02 DIAGNOSIS — M79.652 LEFT THIGH PAIN: Primary | ICD-10-CM

## 2021-03-02 PROCEDURE — 99214 OFFICE O/P EST MOD 30 MIN: CPT | Performed by: STUDENT IN AN ORGANIZED HEALTH CARE EDUCATION/TRAINING PROGRAM

## 2021-03-03 ENCOUNTER — APPOINTMENT (OUTPATIENT)
Dept: LAB | Facility: CLINIC | Age: 71
End: 2021-03-03
Payer: MEDICARE

## 2021-03-03 DIAGNOSIS — Z12.5 PROSTATE CANCER SCREENING: ICD-10-CM

## 2021-03-03 DIAGNOSIS — E78.2 MIXED HYPERLIPIDEMIA: ICD-10-CM

## 2021-03-03 DIAGNOSIS — R73.9 ELEVATED BLOOD SUGAR: ICD-10-CM

## 2021-03-03 LAB
ALBUMIN SERPL BCP-MCNC: 4.1 G/DL (ref 3.5–5)
ALP SERPL-CCNC: 42 U/L (ref 46–116)
ALT SERPL W P-5'-P-CCNC: 27 U/L (ref 12–78)
ANION GAP SERPL CALCULATED.3IONS-SCNC: 5 MMOL/L (ref 4–13)
AST SERPL W P-5'-P-CCNC: 16 U/L (ref 5–45)
BASOPHILS # BLD AUTO: 0.04 THOUSANDS/ΜL (ref 0–0.1)
BASOPHILS NFR BLD AUTO: 1 % (ref 0–1)
BILIRUB SERPL-MCNC: 1.08 MG/DL (ref 0.2–1)
BUN SERPL-MCNC: 23 MG/DL (ref 5–25)
CALCIUM SERPL-MCNC: 9.4 MG/DL (ref 8.3–10.1)
CHLORIDE SERPL-SCNC: 106 MMOL/L (ref 100–108)
CHOLEST SERPL-MCNC: 155 MG/DL (ref 50–200)
CO2 SERPL-SCNC: 31 MMOL/L (ref 21–32)
CREAT SERPL-MCNC: 1.21 MG/DL (ref 0.6–1.3)
EOSINOPHIL # BLD AUTO: 0.19 THOUSAND/ΜL (ref 0–0.61)
EOSINOPHIL NFR BLD AUTO: 3 % (ref 0–6)
ERYTHROCYTE [DISTWIDTH] IN BLOOD BY AUTOMATED COUNT: 12.5 % (ref 11.6–15.1)
EST. AVERAGE GLUCOSE BLD GHB EST-MCNC: 114 MG/DL
GFR SERPL CREATININE-BSD FRML MDRD: 60 ML/MIN/1.73SQ M
GLUCOSE P FAST SERPL-MCNC: 117 MG/DL (ref 65–99)
HBA1C MFR BLD: 5.6 %
HCT VFR BLD AUTO: 50.3 % (ref 36.5–49.3)
HDLC SERPL-MCNC: 43 MG/DL
HGB BLD-MCNC: 16.1 G/DL (ref 12–17)
IMM GRANULOCYTES # BLD AUTO: 0.02 THOUSAND/UL (ref 0–0.2)
IMM GRANULOCYTES NFR BLD AUTO: 0 % (ref 0–2)
LDLC SERPL CALC-MCNC: 89 MG/DL (ref 0–100)
LYMPHOCYTES # BLD AUTO: 1.65 THOUSANDS/ΜL (ref 0.6–4.47)
LYMPHOCYTES NFR BLD AUTO: 28 % (ref 14–44)
MCH RBC QN AUTO: 30.7 PG (ref 26.8–34.3)
MCHC RBC AUTO-ENTMCNC: 32 G/DL (ref 31.4–37.4)
MCV RBC AUTO: 96 FL (ref 82–98)
MONOCYTES # BLD AUTO: 0.64 THOUSAND/ΜL (ref 0.17–1.22)
MONOCYTES NFR BLD AUTO: 11 % (ref 4–12)
NEUTROPHILS # BLD AUTO: 3.26 THOUSANDS/ΜL (ref 1.85–7.62)
NEUTS SEG NFR BLD AUTO: 57 % (ref 43–75)
NONHDLC SERPL-MCNC: 112 MG/DL
NRBC BLD AUTO-RTO: 0 /100 WBCS
PLATELET # BLD AUTO: 175 THOUSANDS/UL (ref 149–390)
PMV BLD AUTO: 9.8 FL (ref 8.9–12.7)
POTASSIUM SERPL-SCNC: 3.9 MMOL/L (ref 3.5–5.3)
PROT SERPL-MCNC: 7.2 G/DL (ref 6.4–8.2)
PSA SERPL-MCNC: 0.7 NG/ML (ref 0–4)
RBC # BLD AUTO: 5.25 MILLION/UL (ref 3.88–5.62)
SODIUM SERPL-SCNC: 142 MMOL/L (ref 136–145)
TRIGL SERPL-MCNC: 113 MG/DL
WBC # BLD AUTO: 5.8 THOUSAND/UL (ref 4.31–10.16)

## 2021-03-03 PROCEDURE — 36415 COLL VENOUS BLD VENIPUNCTURE: CPT

## 2021-03-03 PROCEDURE — G0103 PSA SCREENING: HCPCS

## 2021-03-03 PROCEDURE — 80061 LIPID PANEL: CPT

## 2021-03-03 PROCEDURE — 83036 HEMOGLOBIN GLYCOSYLATED A1C: CPT

## 2021-03-03 PROCEDURE — 80053 COMPREHEN METABOLIC PANEL: CPT

## 2021-03-03 PROCEDURE — 85025 COMPLETE CBC W/AUTO DIFF WBC: CPT

## 2021-03-03 NOTE — PROGRESS NOTES
Assessment/Plan:         Problem List Items Addressed This Visit     None      Visit Diagnoses     Left thigh pain    -  Primary    Relevant Orders    VAS ivc/iliac veins duplex; limited study          Unclear etiology  May also be an underlying VTE however given that this has been going on almost year but I think this is less likely  He does have labs do and would recommend he get these done has it there may be electrolyte disturbance that is causing muscle cramps  Subjective:      Patient ID: Nelia Branham is a 69 y o  male  HPI    Patient is coming in for left thigh pain that has been on off for last 1 year  It is in the mid thigh on the left side  He reports that is dull and aching and at times it can for become warm with overlying skin swelling and erythema  He is concerned he can be a blood clot as a relative  from a blood clot in the leg  He denies any recent travel, recent surgery, prolonged immobilization or any other trauma  Denies any shortness of breath or other symptoms  The following portions of the patient's history were reviewed and updated as appropriate:   Past Medical History:  He has a past medical history of Cardiac disease, Fungal dermatitis, Hyperlipidemia, Hypertension, MRSA (methicillin resistant Staphylococcus aureus) infection, Post traumatic stress disorder (PTSD), and Psychiatric disorder  ,  _______________________________________________________________________  Medical Problems:  does not have any pertinent problems on file ,  _______________________________________________________________________  Past Surgical History:   has a past surgical history that includes Total knee arthroplasty (Bilateral, ); Total hip arthroplasty (Bilateral, , ); Hernia repair (N/A, unknown, supraumbilical); CHOLECYSTECTOMY LAPAROSCOPIC (N/A, 3/7/2016); Joint replacement (Bilateral);  Cholecystectomy; Colonoscopy; pr colonoscopy flx dx w/collj spec when pfrmd (N/A, 2018); and Cataract extraction  ,  _______________________________________________________________________  Family History:  family history includes Aortic aneurysm in his father; Coronary artery disease in his father, paternal grandfather, and paternal grandmother; Diabetes in his mother; Heart disease in his father ,  _______________________________________________________________________  Social History:   reports that he has quit smoking  His smoking use included cigarettes  He quit after 40 00 years of use  He has quit using smokeless tobacco  He reports that he does not drink alcohol or use drugs  ,  _______________________________________________________________________  Allergies:  is allergic to morphine and related     _______________________________________________________________________  Current Outpatient Medications   Medication Sig Dispense Refill    oxybutynin (DITROPAN-XL) 10 MG 24 hr tablet TAKE 1 TABLET BY MOUTH EVERYDAY AT BEDTIME 90 tablet 1    quinapril (ACCUPRIL) 10 mg tablet Take 1 tablet (10 mg total) by mouth daily 90 tablet 3    RESTASIS 0 05 % ophthalmic emulsion INSTILL 1 DROP INTO BOTH EYES TWICE A DAY  3    simvastatin (ZOCOR) 80 mg tablet Take 80 mg by mouth daily at bedtime   traZODone (DESYREL) 50 mg tablet Take 150 mg by mouth daily at bedtime        albuterol (Ventolin HFA) 90 mcg/act inhaler Inhale 2 puffs every 6 (six) hours as needed for wheezing (Patient not taking: Reported on 9/23/2020) 18 g 1    famotidine (PEPCID) 40 MG tablet Take 1 tablet (40 mg total) by mouth daily before dinner (Patient not taking: Reported on 3/2/2021) 90 tablet 3    LOTEMAX SM 0 38 % GEL INSTILL 1 DROP INTO BOTH EYES TWICE A DAY      omeprazole (PriLOSEC) 40 MG capsule Take 1 capsule (40 mg total) by mouth daily 90 capsule 3     No current facility-administered medications for this visit       _______________________________________________________________________  Review of Systems Constitutional: Negative for chills, fatigue and fever  HENT: Negative for rhinorrhea and sore throat  Eyes: Negative for visual disturbance  Respiratory: Negative for cough, chest tightness and shortness of breath  Cardiovascular: Negative for chest pain and palpitations  Gastrointestinal: Negative for abdominal pain, constipation, diarrhea, nausea and vomiting  Genitourinary: Negative for difficulty urinating, dysuria and frequency  Musculoskeletal: Positive for arthralgias and myalgias  Skin: Negative for color change and rash  Neurological: Negative for weakness and headaches  Objective:  Vitals:    03/02/21 1433   BP: 126/72   Pulse: 68   SpO2: 97%   Weight: 119 kg (263 lb)   Height: 6' (1 829 m)     Body mass index is 35 67 kg/m²  Physical Exam  Constitutional:       General: He is not in acute distress  Appearance: He is not ill-appearing  HENT:      Head: Normocephalic and atraumatic  Right Ear: External ear normal       Left Ear: External ear normal       Nose: Nose normal  No congestion or rhinorrhea  Mouth/Throat:      Mouth: Mucous membranes are moist       Pharynx: Oropharynx is clear  No oropharyngeal exudate or posterior oropharyngeal erythema  Eyes:      Extraocular Movements: Extraocular movements intact  Conjunctiva/sclera: Conjunctivae normal       Pupils: Pupils are equal, round, and reactive to light  Neck:      Musculoskeletal: Normal range of motion  Cardiovascular:      Rate and Rhythm: Normal rate and regular rhythm  Pulses: Normal pulses  Heart sounds: No murmur  Pulmonary:      Effort: Pulmonary effort is normal  No respiratory distress  Breath sounds: Normal breath sounds  No wheezing  Chest:      Chest wall: No tenderness  Musculoskeletal: Normal range of motion        Right hip: Normal       Left hip: Normal       Right upper leg: Normal       Left upper leg: Normal       Comments: Homans sign negative b/l Skin:     General: Skin is warm and dry  Capillary Refill: Capillary refill takes less than 2 seconds  Findings: No rash  Neurological:      General: No focal deficit present  Mental Status: He is alert  Mental status is at baseline  Psychiatric:         Mood and Affect: Mood normal          Behavior: Behavior normal          Thought Content:  Thought content normal

## 2021-03-08 ENCOUNTER — HOSPITAL ENCOUNTER (OUTPATIENT)
Dept: ULTRASOUND IMAGING | Facility: CLINIC | Age: 71
Discharge: HOME | End: 2021-03-08
Payer: MEDICARE

## 2021-03-08 ENCOUNTER — HOSPITAL ENCOUNTER (OUTPATIENT)
Dept: NON INVASIVE DIAGNOSTICS | Facility: CLINIC | Age: 71
Discharge: HOME | End: 2021-03-08
Payer: MEDICARE

## 2021-03-08 DIAGNOSIS — I10 ESSENTIAL HYPERTENSION: ICD-10-CM

## 2021-03-08 DIAGNOSIS — F17.200 SMOKER: ICD-10-CM

## 2021-03-08 DIAGNOSIS — M79.652 LEFT THIGH PAIN: ICD-10-CM

## 2021-03-08 PROCEDURE — 93971 EXTREMITY STUDY: CPT | Performed by: SURGERY

## 2021-03-08 PROCEDURE — 93971 EXTREMITY STUDY: CPT

## 2021-03-08 PROCEDURE — 76775 US EXAM ABDO BACK WALL LIM: CPT

## 2021-04-26 ENCOUNTER — OFFICE VISIT (OUTPATIENT)
Dept: FAMILY MEDICINE CLINIC | Facility: CLINIC | Age: 71
End: 2021-04-26
Payer: MEDICARE

## 2021-04-26 VITALS
DIASTOLIC BLOOD PRESSURE: 70 MMHG | RESPIRATION RATE: 14 BRPM | WEIGHT: 264 LBS | TEMPERATURE: 97.8 F | BODY MASS INDEX: 35.76 KG/M2 | HEART RATE: 78 BPM | HEIGHT: 72 IN | OXYGEN SATURATION: 96 % | SYSTOLIC BLOOD PRESSURE: 130 MMHG

## 2021-04-26 DIAGNOSIS — I10 ESSENTIAL HYPERTENSION: Primary | ICD-10-CM

## 2021-04-26 DIAGNOSIS — N52.01 ERECTILE DYSFUNCTION DUE TO ARTERIAL INSUFFICIENCY: ICD-10-CM

## 2021-04-26 DIAGNOSIS — E78.2 MIXED HYPERLIPIDEMIA: ICD-10-CM

## 2021-04-26 DIAGNOSIS — E66.01 OBESITY, MORBID (HCC): ICD-10-CM

## 2021-04-26 PROCEDURE — 99214 OFFICE O/P EST MOD 30 MIN: CPT | Performed by: FAMILY MEDICINE

## 2021-04-26 RX ORDER — SILDENAFIL CITRATE 20 MG/1
60 TABLET ORAL DAILY PRN
Qty: 30 TABLET | Refills: 1 | Status: SHIPPED | OUTPATIENT
Start: 2021-04-26 | End: 2021-10-20 | Stop reason: SDUPTHER

## 2021-04-26 NOTE — PROGRESS NOTES
Assessment/Plan:         Problem List Items Addressed This Visit        Cardiovascular and Mediastinum    Essential hypertension - Primary       Controlled, continue his quinapril         Erectile dysfunction due to arterial insufficiency      Start sildenafil 20 mg 3-5 tablets daily as needed  Relevant Medications    sildenafil (REVATIO) 20 mg tablet       Other    Mixed hyperlipidemia       Controlled, continue his simvastatin  BMI 35 0-35 9,adult    Obesity, morbid (HCC)        BMI Counseling: Body mass index is 35 8 kg/m²  The BMI is above normal  Nutrition recommendations include decreasing portion sizes and encouraging healthy choices of fruits and vegetables  Exercise recommendations include moderate physical activity 150 minutes/week  No pharmacotherapy was ordered  Subjective:      Patient ID: Margot Mcbride is a 69 y o  male  Checkup on his hypertension, hyperlipidemia  He states  He has gained weight since the start of the pandemic  He is enquiring about possibly starting medication for erectile dysfunction  States otherwise he is doing well  The following portions of the patient's history were reviewed and updated as appropriate:   Past Medical History:  He has a past medical history of Cardiac disease, Fungal dermatitis, Hyperlipidemia, Hypertension, MRSA (methicillin resistant Staphylococcus aureus) infection, Post traumatic stress disorder (PTSD), and Psychiatric disorder  ,  _______________________________________________________________________  Medical Problems:  does not have any pertinent problems on file ,  _______________________________________________________________________  Past Surgical History:   has a past surgical history that includes Total knee arthroplasty (Bilateral, 2014); Total hip arthroplasty (Bilateral, 2009, 2010); Hernia repair (N/A, unknown, supraumbilical); CHOLECYSTECTOMY LAPAROSCOPIC (N/A, 3/7/2016);  Joint replacement (Bilateral); Cholecystectomy; Colonoscopy; pr colonoscopy flx dx w/collj spec when pfrmd (N/A, 1/9/2018); and Cataract extraction  ,  _______________________________________________________________________  Family History:  family history includes Aortic aneurysm in his father; Coronary artery disease in his father, paternal grandfather, and paternal grandmother; Diabetes in his mother; Heart disease in his father ,  _______________________________________________________________________  Social History:   reports that he has quit smoking  His smoking use included cigarettes  He quit after 40 00 years of use  He has quit using smokeless tobacco  He reports that he does not drink alcohol or use drugs  ,  _______________________________________________________________________  Allergies:  is allergic to morphine and related     _______________________________________________________________________  Current Outpatient Medications   Medication Sig Dispense Refill    famotidine (PEPCID) 40 MG tablet Take 1 tablet (40 mg total) by mouth daily before dinner 90 tablet 3    omeprazole (PriLOSEC) 40 MG capsule Take 1 capsule (40 mg total) by mouth daily 90 capsule 3    quinapril (ACCUPRIL) 10 mg tablet Take 1 tablet (10 mg total) by mouth daily 90 tablet 3    RESTASIS 0 05 % ophthalmic emulsion INSTILL 1 DROP INTO BOTH EYES TWICE A DAY  3    simvastatin (ZOCOR) 80 mg tablet Take 80 mg by mouth daily at bedtime   traZODone (DESYREL) 50 mg tablet Take 50 mg by mouth daily at bedtime      albuterol (Ventolin HFA) 90 mcg/act inhaler Inhale 2 puffs every 6 (six) hours as needed for wheezing (Patient not taking: Reported on 9/23/2020) 18 g 1    LOTEMAX SM 0 38 % GEL INSTILL 1 DROP INTO BOTH EYES TWICE A DAY      sildenafil (REVATIO) 20 mg tablet Take 3 tablets (60 mg total) by mouth daily as needed (erectile dysfunction) 30 tablet 1     No current facility-administered medications for this visit  _______________________________________________________________________  Review of Systems   Constitutional: Negative for chills, fatigue and fever  HENT: Negative for congestion, ear pain, hearing loss, postnasal drip, rhinorrhea and sore throat  Eyes: Negative for pain and visual disturbance  Respiratory: Negative for chest tightness, shortness of breath and wheezing  Cardiovascular: Negative for chest pain and leg swelling  Gastrointestinal: Negative for abdominal distention, abdominal pain, constipation, diarrhea and vomiting  Endocrine: Negative for cold intolerance and heat intolerance  Genitourinary: Negative for difficulty urinating, frequency and urgency  Musculoskeletal: Negative for arthralgias and gait problem  Skin: Negative for color change  Neurological: Negative for dizziness, tremors, syncope, numbness and headaches  Hematological: Negative for adenopathy  Psychiatric/Behavioral: Negative for agitation, confusion and sleep disturbance  The patient is not nervous/anxious  Objective:  Vitals:    04/26/21 0814   BP: 130/70   Pulse: 78   Resp: 14   Temp: 97 8 °F (36 6 °C)   SpO2: 96%   Weight: 120 kg (264 lb)   Height: 6' (1 829 m)     Body mass index is 35 8 kg/m²  Physical Exam  Vitals signs and nursing note reviewed  Constitutional:       Appearance: He is well-developed  HENT:      Head: Normocephalic  Eyes:      General: No scleral icterus  Conjunctiva/sclera: Conjunctivae normal    Neck:      Musculoskeletal: Normal range of motion  Thyroid: No thyromegaly  Cardiovascular:      Rate and Rhythm: Normal rate and regular rhythm  Heart sounds: Normal heart sounds  No murmur  Pulmonary:      Effort: Pulmonary effort is normal  No respiratory distress  Breath sounds: Normal breath sounds  No wheezing  Abdominal:      General: Bowel sounds are normal  There is no distension  Palpations: Abdomen is soft  Tenderness:  There is no abdominal tenderness  Musculoskeletal: Normal range of motion  General: No tenderness  Lymphadenopathy:      Cervical: No cervical adenopathy  Skin:     General: Skin is warm and dry  Coloration: Skin is not pale  Findings: No rash  Neurological:      Mental Status: He is alert and oriented to person, place, and time  Cranial Nerves: No cranial nerve deficit     Psychiatric:         Behavior: Behavior normal

## 2021-05-31 DIAGNOSIS — I10 ESSENTIAL HYPERTENSION: ICD-10-CM

## 2021-06-01 DIAGNOSIS — I10 ESSENTIAL HYPERTENSION: ICD-10-CM

## 2021-06-01 RX ORDER — QUINAPRIL 10 MG/1
10 TABLET ORAL DAILY
Qty: 90 TABLET | Refills: 0 | Status: SHIPPED | OUTPATIENT
Start: 2021-06-01 | End: 2021-06-01 | Stop reason: SDUPTHER

## 2021-06-01 RX ORDER — QUINAPRIL 10 MG/1
10 TABLET ORAL DAILY
Qty: 90 TABLET | Refills: 0 | Status: SHIPPED | OUTPATIENT
Start: 2021-06-01 | End: 2021-08-25

## 2021-06-29 ENCOUNTER — HOSPITAL ENCOUNTER (OUTPATIENT)
Dept: CT IMAGING | Facility: HOSPITAL | Age: 71
Discharge: HOME | End: 2021-06-29
Attending: INTERNAL MEDICINE
Payer: MEDICARE

## 2021-06-29 DIAGNOSIS — Z87.891 FORMER SMOKER: ICD-10-CM

## 2021-06-29 PROCEDURE — 71271 CT THORAX LUNG CANCER SCR C-: CPT

## 2021-07-13 ENCOUNTER — TELEPHONE (OUTPATIENT)
Dept: PULMONOLOGY | Facility: CLINIC | Age: 71
End: 2021-07-13

## 2021-07-22 ENCOUNTER — OFFICE VISIT (OUTPATIENT)
Dept: PULMONOLOGY | Facility: CLINIC | Age: 71
End: 2021-07-22
Payer: MEDICARE

## 2021-07-22 VITALS
WEIGHT: 264 LBS | SYSTOLIC BLOOD PRESSURE: 136 MMHG | BODY MASS INDEX: 35.76 KG/M2 | HEIGHT: 72 IN | TEMPERATURE: 96.3 F | OXYGEN SATURATION: 92 % | HEART RATE: 63 BPM | DIASTOLIC BLOOD PRESSURE: 84 MMHG

## 2021-07-22 DIAGNOSIS — E66.9 OBESITY (BMI 30-39.9): ICD-10-CM

## 2021-07-22 DIAGNOSIS — J41.0 SIMPLE CHRONIC BRONCHITIS (HCC): ICD-10-CM

## 2021-07-22 DIAGNOSIS — Z87.891 FORMER SMOKER: ICD-10-CM

## 2021-07-22 DIAGNOSIS — R06.02 SOB (SHORTNESS OF BREATH): Primary | ICD-10-CM

## 2021-07-22 PROCEDURE — 99214 OFFICE O/P EST MOD 30 MIN: CPT | Performed by: INTERNAL MEDICINE

## 2021-07-22 NOTE — PROGRESS NOTES
Assessment/Plan:   Diagnoses and all orders for this visit:    SOB (shortness of breath)    Simple chronic bronchitis (HCC)    Obesity (BMI 30-39  9)    Former smoker  -     CT lung screening program; Future      shortness of breath and dyspnea on exertion in this 69-year-old gentleman with greater than 30 pack-year smoking history who quit about 14 years ago, is likely multifactorial secondary to chronic bronchitis/COPD with likely asthma overlap with the allergies, and also likely secondary to his obesity  PFT with post bronchodilator lung volumes and DLCO with mild restrictive pattern, and mildly decreased DLCO  FEV1 was 77% post bronchodilator response was not performed  CT of the chest done May 2020 demonstrating stable micro atelectasis in the lingular otherwise normal  CT lung screening   Recently done image and results reviewed has been stable Will repeat again in 1 year  He also had complete workup done cardiac workup done which has all been negative  Continue with albuterol MDI 2 puffs 4 times daily as needed  MDI technique reviewed with the patient  He states he does not have any symptoms related to sleep disordered breathing  [place] sleepiness score of 5, discussed consequences of untreated sleep apnea and long discussion with the patient with regards to weight loss understands and verbalizes    Return in about 1 year (around 7/22/2022)  All questions are answered to the patient's satisfaction and understanding  He verbalizes understanding  He is encouraged to call with any further questions or concerns  Portions of the record may have been created with voice recognition software  Occasional wrong word or "sound a like" substitutions may have occurred due to the inherent limitations of voice recognition software  Read the chart carefully and recognize, using context, where substitutions have occurred      Electronically Signed by Niall Blakely MD    ______________________________________________________________________    Chief Complaint: No chief complaint on file  Patient ID: Dioni Means is a 66 y o  y o  male has a past medical history of Cardiac disease, Fungal dermatitis, Hyperlipidemia, Hypertension, MRSA (methicillin resistant Staphylococcus aureus) infection, Post traumatic stress disorder (PTSD), and Psychiatric disorder  7/22/2021  Patient presents today for follow-up visit  Patient is a very pleasant 65-year-old gentleman with greater than 30 pack-year smoker history, who quit about 14 years ago he states, in December of 2019 he developed chest tightness shortness of breath and wheezing and he states since then on and off has been having some episodes of cough and wheezing       Review of Systems   Constitutional: Negative  HENT: Negative  Eyes: Negative  Respiratory: Negative  Cardiovascular: Negative  Gastrointestinal: Negative  Endocrine: Negative  Genitourinary: Negative  Musculoskeletal: Negative  Allergic/Immunologic: Negative  Neurological: Negative  Hematological: Negative  Psychiatric/Behavioral: Negative  Smoking history: He reports that he quit smoking about 15 years ago  His smoking use included cigarettes  He started smoking about 57 years ago  He has a 80 00 pack-year smoking history   He has quit using smokeless tobacco     The following portions of the patient's history were reviewed and updated as appropriate: allergies, current medications, past family history, past medical history, past social history, past surgical history and problem list     Immunization History   Administered Date(s) Administered    INFLUENZA 03/27/2014, 10/15/2018    Influenza Quadrivalent Preservative Free 3 years and older IM 10/04/2016    Influenza Split High Dose Preservative Free IM 10/04/2017    Influenza, high dose seasonal 0 7 mL 09/16/2019, 10/23/2020    Pneumococcal 10/19/2004    Pneumococcal Conjugate 13-Valent 08/26/2016, 10/25/2019    Pneumococcal Polysaccharide PPV23 10/17/2018    SARS-CoV-2 / COVID-19 mRNA IM (Pfizer-BioNTech) 02/01/2021, 02/23/2021    Td (adult), Unspecified 08/05/2014     Current Outpatient Medications   Medication Sig Dispense Refill    albuterol (Ventolin HFA) 90 mcg/act inhaler Inhale 2 puffs every 6 (six) hours as needed for wheezing 18 g 1    famotidine (PEPCID) 40 MG tablet Take 1 tablet (40 mg total) by mouth daily before dinner 90 tablet 3    omeprazole (PriLOSEC) 40 MG capsule Take 1 capsule (40 mg total) by mouth daily 90 capsule 3    quinapril (ACCUPRIL) 10 mg tablet Take 1 tablet (10 mg total) by mouth daily 90 tablet 0    RESTASIS 0 05 % ophthalmic emulsion INSTILL 1 DROP INTO BOTH EYES TWICE A DAY  3    sildenafil (REVATIO) 20 mg tablet Take 3 tablets (60 mg total) by mouth daily as needed (erectile dysfunction) 30 tablet 1    simvastatin (ZOCOR) 80 mg tablet Take 80 mg by mouth daily at bedtime   traZODone (DESYREL) 50 mg tablet Take 50 mg by mouth daily at bedtime      LOTEMAX SM 0 38 % GEL INSTILL 1 DROP INTO BOTH EYES TWICE A DAY       No current facility-administered medications for this visit  Allergies: Morphine and related    Objective:  Vitals:    07/22/21 0931   BP: 136/84   Pulse: 63   Temp: (!) 96 3 °F (35 7 °C)   SpO2: 92%   Weight: 120 kg (264 lb)   Height: 6' (1 829 m)   Oxygen Therapy  SpO2: 92 %    Wt Readings from Last 3 Encounters:   07/22/21 120 kg (264 lb)   06/30/21 120 kg (264 lb)   04/26/21 120 kg (264 lb)     Body mass index is 35 8 kg/m²  Physical Exam  Vitals and nursing note reviewed  Constitutional:       Appearance: He is well-developed  HENT:      Head: Normocephalic and atraumatic  Eyes:      Conjunctiva/sclera: Conjunctivae normal       Pupils: Pupils are equal, round, and reactive to light  Neck:      Thyroid: No thyromegaly  Vascular: No JVD     Cardiovascular:      Rate and Rhythm: Normal rate and regular rhythm  Heart sounds: Normal heart sounds  No murmur heard  No friction rub  No gallop  Pulmonary:      Effort: Pulmonary effort is normal  No respiratory distress  Breath sounds: Normal breath sounds  No wheezing or rales  Chest:      Chest wall: No tenderness  Musculoskeletal:         General: No tenderness or deformity  Normal range of motion  Cervical back: Normal range of motion and neck supple  Lymphadenopathy:      Cervical: No cervical adenopathy  Skin:     General: Skin is warm and dry  Neurological:      Mental Status: He is alert and oriented to person, place, and time  Diagnostics:  I have personally reviewed pertinent reports  and images with the patient  CT lung screening program    Result Date: 7/9/2021  Narrative: CT CHEST LUNG CANCER SCREENING WITHOUT IV CONTRAST INDICATION:   Z87 [id number]: Personal history of nicotine dependence  COMPARISON: CT of the chest from May 11, 2020  TECHNIQUE:  Unenhanced CT examination of the chest was performed utilizing a low dose protocol  Reformatted images were created in axial, sagittal, and coronal planes  Radiation dose length product (DLP) for this visit:  280 18 mGy-cm   This examination, like all CT scans performed in the [facility], was performed utilizing techniques to minimize radiation dose exposure, including the use of iterative  reconstruction and automated exposure control  FINDINGS: LUNGS:  Chronic subsegmental atelectasis or scar in the lingula  Lungs otherwise clear  PLEURA:  Unremarkable  HEART/GREAT VESSELS:  Scattered coronary artery calcifications  Small ascending aortic aneurysm, 4 1 cm in maximal diameter  Dilated central pulmonary arteries with the main pulmonary artery diameter of 3 2 cm, suggesting the possibility of pulmonary arterial hypertension  MEDIASTINUM AND ZACHERY: No lymphadenopathy or mass  Esophagus unremarkable    Trachea and main stem bronchi normal  CHEST WALL AND LOWER NECK:   Mild bilateral gynecomastia, unchanged  No lymphadenopathy or mass  VISUALIZED STRUCTURES IN THE UPPER ABDOMEN:  Unremarkable  OSSEOUS STRUCTURES:  No acute fracture or destructive osseous lesion  Impression: 1  No nodules and/or definitely benign nodules  2   4 1 cm ascending aortic aneurysm, unchanged  Lung-RADS1, negative  Continued annual screening with LDCT in 12 months   Workstation performed: [id number]

## 2021-08-11 ENCOUNTER — TELEPHONE (OUTPATIENT)
Dept: CARDIOLOGY CLINIC | Facility: CLINIC | Age: 71
End: 2021-08-11

## 2021-08-11 NOTE — TELEPHONE ENCOUNTER
Patient had CT scan for lungs done in July and he saw that it mentions an aneurysm in is aorta that has been there  He would like to talk to you about it as it was never mentioned before

## 2021-08-12 ENCOUNTER — TELEPHONE (OUTPATIENT)
Dept: FAMILY MEDICINE CLINIC | Facility: CLINIC | Age: 71
End: 2021-08-12

## 2021-08-12 DIAGNOSIS — Z11.9 ENCOUNTER FOR SCREENING FOR INFECTIOUS AND PARASITIC DISEASES, UNSPECIFIED: Primary | ICD-10-CM

## 2021-08-12 NOTE — TELEPHONE ENCOUNTER
Please call the patient and inform him that CT scan showed mild aortic aneurysm which is unchanged from prior imaging  This need monitoring for now

## 2021-08-12 NOTE — TELEPHONE ENCOUNTER
CHAS saravia from patient asking to be scheduled for a Travel Covid Swab  He will be traveling by car to [facility]) on Wednesday 8/25/21  Discussed with Pract Admin and authorized to schedule  Pt notified it may not be covered by insurance - he asked why it wouldn't and explained because the test was for personal reasons for travel, not a medical necessity as if he were sick  He was told could cost $100 - $150  He also asked if his wife could be tested, Yasmani Sheldon  Entered her chart and found she sees a different PCP  Instructed that she needs to contact her Dr for an order and be tested there    Please place order for Omar humphrey for covid swab to be on 8/23/21 @ 11:00 am

## 2021-08-12 NOTE — TELEPHONE ENCOUNTER
Spoke with patient, advised the aneurysm has unchanged since previous imaging  Will continue to monitor    Verbally understands

## 2021-08-23 PROCEDURE — U0003 INFECTIOUS AGENT DETECTION BY NUCLEIC ACID (DNA OR RNA); SEVERE ACUTE RESPIRATORY SYNDROME CORONAVIRUS 2 (SARS-COV-2) (CORONAVIRUS DISEASE [COVID-19]), AMPLIFIED PROBE TECHNIQUE, MAKING USE OF HIGH THROUGHPUT TECHNOLOGIES AS DESCRIBED BY CMS-2020-01-R: HCPCS | Performed by: FAMILY MEDICINE

## 2021-08-23 PROCEDURE — U0005 INFEC AGEN DETEC AMPLI PROBE: HCPCS | Performed by: FAMILY MEDICINE

## 2021-08-24 LAB — SARS-COV-2 RNA RESP QL NAA+PROBE: NEGATIVE

## 2021-08-25 ENCOUNTER — TELEPHONE (OUTPATIENT)
Dept: FAMILY MEDICINE CLINIC | Facility: CLINIC | Age: 71
End: 2021-08-25

## 2021-08-25 DIAGNOSIS — I10 ESSENTIAL HYPERTENSION: ICD-10-CM

## 2021-08-25 RX ORDER — QUINAPRIL 10 MG/1
TABLET ORAL
Qty: 90 TABLET | Refills: 0 | Status: SHIPPED | OUTPATIENT
Start: 2021-08-25 | End: 2022-01-09

## 2021-08-25 NOTE — TELEPHONE ENCOUNTER
She needs to mask when ever she is around anyone, he does not need to do anything unless he develops symptoms  He should mask when he is in public for 10 days

## 2021-08-25 NOTE — TELEPHONE ENCOUNTER
Pt and his wife were covid tested for travel  His wife, Marsha Zee, has tested positive  Asymptomatic  Vaccinated  Not our patient  He is vaccinated  He is asking what his steps are to protect himself  She was not instructed by her doctor to quarantine!   [phone]

## 2021-09-01 ENCOUNTER — TELEPHONE (OUTPATIENT)
Dept: FAMILY MEDICINE CLINIC | Facility: CLINIC | Age: 71
End: 2021-09-01

## 2021-09-01 DIAGNOSIS — Z11.9 ENCOUNTER FOR SCREENING FOR INFECTIOUS AND PARASITIC DISEASES, UNSPECIFIED: Primary | ICD-10-CM

## 2021-09-01 NOTE — TELEPHONE ENCOUNTER
Patient placed on COVID scheduled for tomorrow at [facility] with patient's wife and advised okay to come tomorrow and explained curbside swab instructions

## 2021-09-01 NOTE — TELEPHONE ENCOUNTER
Patient called requesting COVID test for travel  States that he would like to leave this weekend  Reviewed chart, wife tested positive but per patient her 10 day quarantine will be up by the time they leave  Patient is asymptomatic  Patient aware that he may receive bill for screening COVID test     Patient is requesting to come tomorrow at 11am for COVID test      Please advise

## 2021-09-03 ENCOUNTER — TELEPHONE (OUTPATIENT)
Dept: FAMILY MEDICINE CLINIC | Facility: CLINIC | Age: 71
End: 2021-09-03

## 2021-10-20 DIAGNOSIS — N52.01 ERECTILE DYSFUNCTION DUE TO ARTERIAL INSUFFICIENCY: ICD-10-CM

## 2021-10-26 RX ORDER — SILDENAFIL CITRATE 20 MG/1
60 TABLET ORAL DAILY PRN
Qty: 30 TABLET | Refills: 0 | Status: SHIPPED | OUTPATIENT
Start: 2021-10-26 | End: 2022-05-02

## 2021-11-01 ENCOUNTER — OFFICE VISIT (OUTPATIENT)
Dept: FAMILY MEDICINE CLINIC | Facility: CLINIC | Age: 71
End: 2021-11-01
Payer: MEDICARE

## 2021-11-01 VITALS
TEMPERATURE: 98.2 F | BODY MASS INDEX: 35.49 KG/M2 | WEIGHT: 262 LBS | HEIGHT: 72 IN | DIASTOLIC BLOOD PRESSURE: 72 MMHG | SYSTOLIC BLOOD PRESSURE: 124 MMHG | OXYGEN SATURATION: 95 % | HEART RATE: 74 BPM

## 2021-11-01 DIAGNOSIS — M54.2 CERVICALGIA: ICD-10-CM

## 2021-11-01 DIAGNOSIS — Z00.00 HEALTH CARE MAINTENANCE: Primary | ICD-10-CM

## 2021-11-01 DIAGNOSIS — I10 ESSENTIAL HYPERTENSION: ICD-10-CM

## 2021-11-01 DIAGNOSIS — E78.2 MIXED HYPERLIPIDEMIA: ICD-10-CM

## 2021-11-01 PROCEDURE — 99214 OFFICE O/P EST MOD 30 MIN: CPT | Performed by: FAMILY MEDICINE

## 2021-11-01 PROCEDURE — G0439 PPPS, SUBSEQ VISIT: HCPCS | Performed by: FAMILY MEDICINE

## 2021-11-22 ENCOUNTER — OFFICE VISIT (OUTPATIENT)
Dept: CARDIOLOGY CLINIC | Facility: CLINIC | Age: 71
End: 2021-11-22
Payer: MEDICARE

## 2021-11-22 VITALS
HEART RATE: 69 BPM | SYSTOLIC BLOOD PRESSURE: 126 MMHG | HEIGHT: 72 IN | OXYGEN SATURATION: 95 % | DIASTOLIC BLOOD PRESSURE: 74 MMHG | WEIGHT: 263 LBS | RESPIRATION RATE: 16 BRPM | BODY MASS INDEX: 35.62 KG/M2

## 2021-11-22 DIAGNOSIS — I10 PRIMARY HYPERTENSION: Primary | ICD-10-CM

## 2021-11-22 DIAGNOSIS — I77.810 AORTIC ROOT DILATION (HCC): ICD-10-CM

## 2021-11-22 DIAGNOSIS — E78.2 MIXED HYPERLIPIDEMIA: ICD-10-CM

## 2021-11-22 DIAGNOSIS — I07.1 TRICUSPID VALVE INSUFFICIENCY, UNSPECIFIED ETIOLOGY: ICD-10-CM

## 2021-11-22 PROCEDURE — 99214 OFFICE O/P EST MOD 30 MIN: CPT | Performed by: INTERNAL MEDICINE

## 2022-01-09 DIAGNOSIS — I10 ESSENTIAL HYPERTENSION: ICD-10-CM

## 2022-01-09 RX ORDER — QUINAPRIL 10 MG/1
TABLET ORAL
Qty: 90 TABLET | Refills: 0 | Status: SHIPPED | OUTPATIENT
Start: 2022-01-09 | End: 2022-04-16

## 2022-03-21 ENCOUNTER — TELEPHONE (OUTPATIENT)
Dept: UROLOGY | Facility: AMBULATORY SURGERY CENTER | Age: 72
End: 2022-03-21

## 2022-03-21 DIAGNOSIS — N50.89 TESTICULAR SWELLING: Primary | ICD-10-CM

## 2022-03-21 NOTE — TELEPHONE ENCOUNTER
Called and advised patient US order in now in place  Advised he call and schedule this for prior to appt on 3/31/22  He verbalized understanding

## 2022-03-21 NOTE — TELEPHONE ENCOUNTER
PT called central scheduling as advised, however no US is in the system for him  Can this be put in please?      He would like a CB as soon as it's in

## 2022-03-21 NOTE — TELEPHONE ENCOUNTER
Called and left voicemail for patient to please call the office to discuss schedule imaging ( scrotal US) followed by office visit

## 2022-03-21 NOTE — TELEPHONE ENCOUNTER
Pt under care of Giuliano Becerril    Reason for call: Had colonoscopy and was told prostate is enlarged  Pt stated left testicle is enlarged and discomortable   Pt stated no buring or blodd in urine         Pt can be reached at: [phone]   Cell [phone]

## 2022-03-21 NOTE — TELEPHONE ENCOUNTER
Pts care is managed by the [facility]  Pt was last seen 5/29/20    Pt with enlarged prostate and testicular swelling and pain for the last several weeks  Pt is requesting an appointment to be evaluated  Please advise

## 2022-03-21 NOTE — TELEPHONE ENCOUNTER
Called and spoke with patient  Patient scheduled 03/31/22 at 10:45 am with Angelica Boyd PA-C  Gave patient number for central scheduling to schedule ultrasound

## 2022-03-22 ENCOUNTER — HOSPITAL ENCOUNTER (OUTPATIENT)
Dept: ULTRASOUND IMAGING | Facility: HOSPITAL | Age: 72
Discharge: HOME | End: 2022-03-22
Payer: MEDICARE

## 2022-03-22 DIAGNOSIS — N50.89 TESTICULAR SWELLING: ICD-10-CM

## 2022-03-22 PROCEDURE — 76870 US EXAM SCROTUM: CPT

## 2022-03-30 NOTE — PROGRESS NOTES
3/31/2022      Chief Complaint   Patient presents with    Testicle Pain     Assessment and Plan    1  Left testicular swelling/pain  - Ultrasound from 03/22/2022 - negative for testicular mass or acute findings  Showed small bilateral hydroceles and stable moderate sized fat containing left inguinal hernia  - physical exam consistent with above findings, hernia is not incarcerated  - referral to General surgery provided for further management    2  BPH with LUTS  - chronic issue, has both obstructive and irritative voiding symptoms  Previously failed Flomax and oxybutynin  Reviewed additional pharmacotherapy options or surgical consideration  He would like to avoid surgery  Will trial Alfuzosin    - F/u in 3-4 months for symptom reassessment  History of Present Illness  Sofia Barriga is a 66 y o  male here for follow up evaluation of  BPH and left scrotal swelling  He recently complains of discomfort in the left scrotal region as well as increasing swelling  He previously was seen for similar issue  Ultrasound completed shows stable and similar findings without any significant urologic findings  Does show a stable moderate sized fat containing left inguinal hernia  Patient also has chronic lower urinary tract symptoms  He complains of urinary urgency, frequency, nocturia, and weak urinary stream   He previously failed Flomax and oxybutynin in the past   He denies any new urinary issues such as dysuria or gross hematuria  PSA from 3/3/21 was 0 7    Urine dip negative for blood, leukocytes, or nitrites  PVR=0 mL    AUA SYMPTOM SCORE      Most Recent Value   AUA SYMPTOM SCORE    How often have you had a sensation of not emptying your bladder completely after you finished urinating? 3   How often have you had to urinate again less than two hours after you finished urinating? 3   How often have you found you stopped and started again several times when you urinate?  2   How often have you found it difficult to postpone urination? 4   How often have you had a weak urinary stream? 4   How often have you had to push or strain to begin urination? 0   How many times did you most typically get up to urinate from the time you went to bed at night until the time you got up in the morning? 3   Quality of Life: If you were to spend the rest of your life with your urinary condition just the way it is now, how would you feel about that? 3   AUA SYMPTOM SCORE 19          Review of Systems   Constitutional: Negative for chills and fever  Respiratory: Negative for shortness of breath  Cardiovascular: Negative for chest pain  Gastrointestinal: Negative for abdominal pain  Genitourinary: Positive for difficulty urinating, frequency, scrotal swelling, testicular pain and urgency  Negative for dysuria, flank pain, hematuria, penile pain and penile swelling  Neurological: Negative for dizziness  Past Medical History  Past Medical History:   Diagnosis Date    Cardiac disease     Fungal dermatitis     Hyperlipidemia     Hypertension     per [employer]: Essential hypertension ith goal blood pressure less than 130/85; Resolved:4/20/17    MRSA (methicillin resistant Staphylococcus aureus) infection     Post traumatic stress disorder (PTSD)     Psychiatric disorder        Past Social History  Past Surgical History:   Procedure Laterality Date    CATARACT EXTRACTION      CHOLECYSTECTOMY      CHOLECYSTECTOMY LAPAROSCOPIC N/A 3/7/2016    Procedure: CHOLECYSTECTOMY LAPAROSCOPIC;  Surgeon: Lily Santana DO;  [facility];  Service:     COLONOSCOPY      HERNIA REPAIR N/A unknown, supraumbilical    JOINT REPLACEMENT Bilateral     hips and knees    OK COLONOSCOPY FLX DX W/COLLJ SPEC WHEN PFRMD N/A 1/9/2018    Procedure: COLONOSCOPY;  Surgeon: Christian Ceja MD;  Location: [facility];   Service: Gastroenterology    TOTAL HIP ARTHROPLASTY Bilateral 2009, 2010    TOTAL KNEE ARTHROPLASTY Bilateral      Social History     Tobacco Use   Smoking Status Former Smoker    Packs/day: 2 00    Years: 40 00    Pack years: 80 00    Types: Cigarettes    Start date:     Quit date:     Years since quittin 2   Smokeless Tobacco Former User       Past Family History  Family History   Problem Relation Age of Onset    Diabetes Mother     Heart disease Father     Coronary artery disease Father     Aortic aneurysm Father         Ruptured abdominal aortic aneurysm    Coronary artery disease Paternal Grandmother     Coronary artery disease Paternal Grandfather        Past Social history  Social History     Socioeconomic History    Marital status: /Civil Union     Spouse name: Not on file    Number of children: Not on file    Years of education: Not on file    Highest education level: Not on file   Occupational History    Occupation: Retired   Tobacco Use    Smoking status: Former Smoker     Packs/day: 2 00     Years: 40 00     Pack years: 80 00     Types: Cigarettes     Start date:      Quit date:      Years since quittin 2    Smokeless tobacco: Former User   Vaping Use    Vaping Use: Never used   Substance and Sexual Activity    Alcohol use: No    Drug use: No    Sexual activity: Not on file   Other Topics Concern    Not on file   Social History Narrative    Daily caffeine consumption     Social Determinants of Health     Financial Resource Strain: Not on file   Food Insecurity: Not on file   Transportation Needs: Not on file   Physical Activity: Not on file   Stress: Not on file   Social Connections: Not on file   Intimate Partner Violence: Not on file   Housing Stability: Not on file       Current Medications  Current Outpatient Medications   Medication Sig Dispense Refill    famotidine (PEPCID) 40 MG tablet Take 1 tablet (40 mg total) by mouth daily before dinner 90 tablet 3    quinapril (ACCUPRIL) 10 mg tablet TAKE 1 TABLET BY MOUTH EVERY DAY 90 tablet 0    RESTASIS 0 05 % ophthalmic emulsion INSTILL 1 DROP INTO BOTH EYES TWICE A DAY  3    sildenafil (REVATIO) 20 mg tablet Take 3 tablets (60 mg total) by mouth daily as needed (erectile dysfunction) 30 tablet 0    simvastatin (ZOCOR) 80 mg tablet Take 80 mg by mouth daily at bedtime   traZODone (DESYREL) 50 mg tablet Take 50 mg by mouth daily at bedtime      albuterol (Ventolin HFA) 90 mcg/act inhaler Inhale 2 puffs every 6 (six) hours as needed for wheezing (Patient not taking: Reported on 11/22/2021 ) 18 g 1    alfuzosin (UROXATRAL) 10 mg 24 hr tablet Take 1 tablet (10 mg total) by mouth daily 90 tablet 1    omeprazole (PriLOSEC) 40 MG capsule Take 1 capsule (40 mg total) by mouth daily (Patient not taking: Reported on 11/22/2021 ) 90 capsule 3     No current facility-administered medications for this visit  Allergies  Allergies   Allergen Reactions    Morphine And Related GI Intolerance         The following portions of the patient's history were reviewed and updated as appropriate: allergies, current medications, past medical history, past social history, past surgical history and problem list       Vitals  Vitals:    03/31/22 1038   BP: 137/83   Pulse: 84   Resp: 16   Temp: 98 °F (36 7 °C)   TempSrc: Temporal   SpO2: 98%   Weight: 122 kg (270 lb)   Height: 6' (1 829 m)           Physical Exam  Physical Exam  Constitutional:       Appearance: Normal appearance  He is obese  HENT:      Head: Normocephalic and atraumatic  Right Ear: External ear normal       Left Ear: External ear normal    Eyes:      General: No scleral icterus  Conjunctiva/sclera: Conjunctivae normal    Cardiovascular:      Pulses: Normal pulses  Pulmonary:      Effort: Pulmonary effort is normal    Abdominal:      Hernia: A hernia is present  Genitourinary:     Comments: Left inguinal hernia  No testicular masses  No scrotal erythema  No significant tenderness to palpation    Musculoskeletal:         General: Normal range of motion  Cervical back: Normal range of motion  Skin:     General: Skin is warm and dry  Neurological:      General: No focal deficit present  Mental Status: He is alert and oriented to person, place, and time  Psychiatric:         Mood and Affect: Mood normal          Behavior: Behavior normal          Thought Content:  Thought content normal          Judgment: Judgment normal            Results  Recent Results (from the past 1 hour(s))   POCT Measure PVR    Collection Time: 03/31/22 10:44 AM   Result Value Ref Range    POST-VOID RESIDUAL VOLUME, ML POC 0 mL   POCT urine dip    Collection Time: 03/31/22 10:44 AM   Result Value Ref Range    LEUKOCYTE ESTERASE,UA -     NITRITE,UA -     SL AMB POCT UROBILINOGEN 0 2     POCT URINE PROTEIN -      PH,UA 5 0     BLOOD,UA -     SPECIFIC GRAVITY,UA 1 025     KETONES,UA 40     BILIRUBIN,UA small     GLUCOSE, UA 1,000      COLOR,UA yellow     CLARITY,UA clear    ]  Lab Results   Component Value Date    PSA 0 7 03/03/2021    PSA 1 1 10/10/2019    PSA 0 5 04/11/2018     Lab Results   Component Value Date    CALCIUM 9 4 03/03/2021     03/21/2017    K 3 9 03/03/2021    CO2 31 03/03/2021     03/03/2021    BUN 23 03/03/2021    CREATININE 1 21 03/03/2021     Lab Results   Component Value Date    WBC 5 80 03/03/2021    HGB 16 1 03/03/2021    HCT 50 3 (H) 03/03/2021    MCV 96 03/03/2021     03/03/2021           Orders  Orders Placed This Encounter   Procedures    Ambulatory Referral to General Surgery     Standing Status:   Future     Standing Expiration Date:   3/31/2023     Referral Priority:   Routine     Referral Type:   Consult - AMB     Referral Reason:   Specialty Services Required     Requested Specialty:   General Surgery     Number of Visits Requested:   1     Expiration Date:   3/31/2023    POCT Measure PVR    POCT urine dip       Malcolm Villegas PA-C

## 2022-03-31 ENCOUNTER — OFFICE VISIT (OUTPATIENT)
Dept: UROLOGY | Facility: CLINIC | Age: 72
End: 2022-03-31
Payer: MEDICARE

## 2022-03-31 VITALS
SYSTOLIC BLOOD PRESSURE: 137 MMHG | HEIGHT: 72 IN | TEMPERATURE: 98 F | BODY MASS INDEX: 36.57 KG/M2 | OXYGEN SATURATION: 98 % | RESPIRATION RATE: 16 BRPM | DIASTOLIC BLOOD PRESSURE: 83 MMHG | WEIGHT: 270 LBS | HEART RATE: 84 BPM

## 2022-03-31 DIAGNOSIS — N13.8 BPH WITH OBSTRUCTION/LOWER URINARY TRACT SYMPTOMS: Primary | ICD-10-CM

## 2022-03-31 DIAGNOSIS — N40.1 BPH WITH OBSTRUCTION/LOWER URINARY TRACT SYMPTOMS: Primary | ICD-10-CM

## 2022-03-31 DIAGNOSIS — K40.90 LEFT INGUINAL HERNIA: ICD-10-CM

## 2022-03-31 LAB
POST-VOID RESIDUAL VOLUME, ML POC: 0 ML
SL AMB  POCT GLUCOSE, UA: 1000
SL AMB LEUKOCYTE ESTERASE,UA: NORMAL
SL AMB POCT BILIRUBIN,UA: NORMAL
SL AMB POCT BLOOD,UA: NORMAL
SL AMB POCT CLARITY,UA: CLEAR
SL AMB POCT COLOR,UA: YELLOW
SL AMB POCT KETONES,UA: 40
SL AMB POCT NITRITE,UA: NORMAL
SL AMB POCT PH,UA: 5
SL AMB POCT SPECIFIC GRAVITY,UA: 1.02
SL AMB POCT URINE PROTEIN: NORMAL
SL AMB POCT UROBILINOGEN: 0.2

## 2022-03-31 PROCEDURE — 81002 URINALYSIS NONAUTO W/O SCOPE: CPT | Performed by: PHYSICIAN ASSISTANT

## 2022-03-31 PROCEDURE — 51798 US URINE CAPACITY MEASURE: CPT | Performed by: PHYSICIAN ASSISTANT

## 2022-03-31 PROCEDURE — 99213 OFFICE O/P EST LOW 20 MIN: CPT | Performed by: PHYSICIAN ASSISTANT

## 2022-03-31 RX ORDER — ALFUZOSIN HYDROCHLORIDE 10 MG/1
10 TABLET, EXTENDED RELEASE ORAL DAILY
Qty: 90 TABLET | Refills: 1 | Status: SHIPPED | OUTPATIENT
Start: 2022-03-31

## 2022-04-08 ENCOUNTER — TELEPHONE (OUTPATIENT)
Dept: CARDIOLOGY CLINIC | Facility: CLINIC | Age: 72
End: 2022-04-08

## 2022-04-08 ENCOUNTER — OFFICE VISIT (OUTPATIENT)
Dept: LAB | Facility: HOSPITAL | Age: 72
End: 2022-04-08
Payer: MEDICARE

## 2022-04-08 ENCOUNTER — HOSPITAL ENCOUNTER (OUTPATIENT)
Dept: RADIOLOGY | Facility: HOSPITAL | Age: 72
Discharge: HOME | End: 2022-04-08
Payer: MEDICARE

## 2022-04-08 ENCOUNTER — APPOINTMENT (OUTPATIENT)
Dept: LAB | Facility: HOSPITAL | Age: 72
End: 2022-04-08
Payer: MEDICARE

## 2022-04-08 ENCOUNTER — CONSULT (OUTPATIENT)
Dept: SURGERY | Facility: CLINIC | Age: 72
End: 2022-04-08
Payer: MEDICARE

## 2022-04-08 VITALS
SYSTOLIC BLOOD PRESSURE: 134 MMHG | BODY MASS INDEX: 36.3 KG/M2 | OXYGEN SATURATION: 95 % | WEIGHT: 268 LBS | HEART RATE: 66 BPM | HEIGHT: 72 IN | DIASTOLIC BLOOD PRESSURE: 80 MMHG

## 2022-04-08 DIAGNOSIS — K40.90 LEFT INGUINAL HERNIA: ICD-10-CM

## 2022-04-08 DIAGNOSIS — E66.01 OBESITY, MORBID (HCC): ICD-10-CM

## 2022-04-08 LAB
ANION GAP SERPL CALCULATED.3IONS-SCNC: 7 MMOL/L (ref 4–13)
ATRIAL RATE: 65 BPM
BASOPHILS # BLD AUTO: 0.02 THOUSANDS/ΜL (ref 0–0.1)
BASOPHILS NFR BLD AUTO: 0 % (ref 0–1)
BUN SERPL-MCNC: 16 MG/DL (ref 5–25)
CALCIUM SERPL-MCNC: 9 MG/DL (ref 8.3–10.1)
CHLORIDE SERPL-SCNC: 107 MMOL/L (ref 100–108)
CO2 SERPL-SCNC: 28 MMOL/L (ref 21–32)
CREAT SERPL-MCNC: 1.09 MG/DL (ref 0.6–1.3)
EOSINOPHIL # BLD AUTO: 0.13 THOUSAND/ΜL (ref 0–0.61)
EOSINOPHIL NFR BLD AUTO: 2 % (ref 0–6)
ERYTHROCYTE [DISTWIDTH] IN BLOOD BY AUTOMATED COUNT: 12.8 % (ref 11.6–15.1)
GFR SERPL CREATININE-BSD FRML MDRD: 67 ML/MIN/1.73SQ M
GLUCOSE P FAST SERPL-MCNC: 108 MG/DL (ref 65–99)
HCT VFR BLD AUTO: 46 % (ref 36.5–49.3)
HGB BLD-MCNC: 14.8 G/DL (ref 12–17)
IMM GRANULOCYTES # BLD AUTO: 0.02 THOUSAND/UL (ref 0–0.2)
IMM GRANULOCYTES NFR BLD AUTO: 0 % (ref 0–2)
LYMPHOCYTES # BLD AUTO: 1.34 THOUSANDS/ΜL (ref 0.6–4.47)
LYMPHOCYTES NFR BLD AUTO: 24 % (ref 14–44)
MCH RBC QN AUTO: 31.1 PG (ref 26.8–34.3)
MCHC RBC AUTO-ENTMCNC: 32.2 G/DL (ref 31.4–37.4)
MCV RBC AUTO: 97 FL (ref 82–98)
MONOCYTES # BLD AUTO: 0.55 THOUSAND/ΜL (ref 0.17–1.22)
MONOCYTES NFR BLD AUTO: 10 % (ref 4–12)
NEUTROPHILS # BLD AUTO: 3.49 THOUSANDS/ΜL (ref 1.85–7.62)
NEUTS SEG NFR BLD AUTO: 64 % (ref 43–75)
NRBC BLD AUTO-RTO: 0 /100 WBCS
P AXIS: 34 DEGREES
PLATELET # BLD AUTO: 156 THOUSANDS/UL (ref 149–390)
PMV BLD AUTO: 9.5 FL (ref 8.9–12.7)
POTASSIUM SERPL-SCNC: 4.1 MMOL/L (ref 3.5–5.3)
PR INTERVAL: 164 MS
QRS AXIS: 37 DEGREES
QRSD INTERVAL: 88 MS
QT INTERVAL: 420 MS
QTC INTERVAL: 436 MS
RBC # BLD AUTO: 4.76 MILLION/UL (ref 3.88–5.62)
SODIUM SERPL-SCNC: 142 MMOL/L (ref 136–145)
T WAVE AXIS: 53 DEGREES
VENTRICULAR RATE: 65 BPM
WBC # BLD AUTO: 5.55 THOUSAND/UL (ref 4.31–10.16)

## 2022-04-08 PROCEDURE — 99204 OFFICE O/P NEW MOD 45 MIN: CPT | Performed by: SURGERY

## 2022-04-08 PROCEDURE — 85025 COMPLETE CBC W/AUTO DIFF WBC: CPT

## 2022-04-08 PROCEDURE — 36415 COLL VENOUS BLD VENIPUNCTURE: CPT

## 2022-04-08 PROCEDURE — 80048 BASIC METABOLIC PNL TOTAL CA: CPT

## 2022-04-08 PROCEDURE — 93010 ELECTROCARDIOGRAM REPORT: CPT | Performed by: INTERNAL MEDICINE

## 2022-04-08 PROCEDURE — 93005 ELECTROCARDIOGRAM TRACING: CPT

## 2022-04-08 PROCEDURE — 71046 X-RAY EXAM CHEST 2 VIEWS: CPT

## 2022-04-08 NOTE — PROGRESS NOTES
Assessment/Plan:     1  Left inguinal hernia  -     Ambulatory Referral to General Surgery  -     Case request operating room: REPAIR HERNIA INGUINAL, HYDROCELECTOMY; Standing  -     CBC and differential; Future  -     EKG 12 lead; Future  -     XR chest pa & lateral; Future; Expected date: 04/08/2022  -     Basic metabolic panel; Future  -     Case request operating room: REPAIR HERNIA INGUINAL, HYDROCELECTOMY    2  Obesity, morbid ([facility] [address] )        We discussed the risks and benefits of open inguinal hernia repair and hydrocelectomy including but not limited to mesh placement to decrease risk of recurrence, risk of bleeding, infection, damage to nerves or other structures, acute and chronic pain, recurrence, reactions to medications  Questions answered and consent signed  Subjective:      Patient ID: Carmita Ching is a 66 y o  male  Triage Notes:    Mr Nataly Diaz is a 65 yo M presenting with a LIH that has been present a few years  Has some intermittent testicular swelling and there is some mild discomfort  Abdominal surgical history includes cholecystectomy and umbilical hernia repair  The following portions of the patient's history were reviewed and updated as appropriate:   He  has a past medical history of Cardiac disease, Fungal dermatitis, Hyperlipidemia, Hypertension, MRSA (methicillin resistant Staphylococcus aureus) infection, Post traumatic stress disorder (PTSD), and Psychiatric disorder    He   Patient Active Problem List    Diagnosis Date Noted    BMI 35 0-35 9,adult 04/26/2021    Erectile dysfunction due to arterial insufficiency 04/26/2021    Obesity, morbid ([address] ) 04/26/2021    Gastroesophageal reflux disease without esophagitis 10/23/2020    SOB (shortness of breath)     BPH with obstruction/lower urinary tract symptoms 05/29/2020    Hematospermia 05/29/2020    Scrotal swelling 05/29/2020    Hydrocele 01/30/2020    Tension headache 09/16/2019    Cervicalgia 09/16/2019    Stenosis of left carotid artery 09/16/2019    Seborrheic keratosis 05/28/2019    Diarrhea 01/09/2019    Multiple allergies 01/09/2019    Elevated blood sugar 04/25/2018    Post traumatic stress disorder 03/06/2016    Mixed hyperlipidemia 03/06/2016    Essential hypertension 03/06/2016     He  has a past surgical history that includes Total knee arthroplasty (Bilateral, 2014); Total hip arthroplasty (Bilateral, 2009, 2010); Hernia repair (N/A, unknown, supraumbilical); CHOLECYSTECTOMY LAPAROSCOPIC (N/A, 3/7/2016); Joint replacement (Bilateral); Cholecystectomy; Colonoscopy; pr colonoscopy flx dx w/collj spec when pfrmd (N/A, 1/9/2018); and Cataract extraction  His family history includes Aortic aneurysm in his father; Coronary artery disease in his father, paternal grandfather, and paternal grandmother; Diabetes in his mother; Heart disease in his father  He  reports that he quit smoking about 16 years ago  His smoking use included cigarettes  He started smoking about 58 years ago  He has a 80 00 pack-year smoking history  He has quit using smokeless tobacco  He reports that he does not drink alcohol and does not use drugs  Current Outpatient Medications on File Prior to Visit   Medication Sig    famotidine (PEPCID) 40 MG tablet Take 1 tablet (40 mg total) by mouth daily before dinner    quinapril (ACCUPRIL) 10 mg tablet TAKE 1 TABLET BY MOUTH EVERY DAY    RESTASIS 0 05 % ophthalmic emulsion INSTILL 1 DROP INTO BOTH EYES TWICE A DAY    simvastatin (ZOCOR) 80 mg tablet Take 80 mg by mouth daily at bedtime      traZODone (DESYREL) 50 mg tablet Take 50 mg by mouth daily at bedtime    albuterol (Ventolin HFA) 90 mcg/act inhaler Inhale 2 puffs every 6 (six) hours as needed for wheezing (Patient not taking: Reported on 11/22/2021 )    alfuzosin (UROXATRAL) 10 mg 24 hr tablet Take 1 tablet (10 mg total) by mouth daily (Patient not taking: Reported on 4/8/2022 )    omeprazole (PriLOSEC) 40 MG capsule Take 1 capsule (40 mg total) by mouth daily (Patient not taking: Reported on 11/22/2021 )    sildenafil (REVATIO) 20 mg tablet Take 3 tablets (60 mg total) by mouth daily as needed (erectile dysfunction)     No current facility-administered medications on file prior to visit  He is allergic to morphine and related       Review of Systems   Constitutional: Negative for appetite change, chills, fever and unexpected weight change  HENT: Negative for hearing loss, sore throat, trouble swallowing and voice change  Eyes: Negative for visual disturbance  Respiratory: Negative for cough, chest tightness, shortness of breath and wheezing  Cardiovascular: Negative for chest pain and palpitations  Gastrointestinal: Negative for abdominal distention and blood in stool  Endocrine: Negative for cold intolerance and heat intolerance  Genitourinary: Positive for scrotal swelling  Negative for difficulty urinating  Musculoskeletal: Negative for gait problem  Skin: Negative for color change and rash  Neurological: Negative for dizziness, speech difficulty, light-headedness and numbness  Hematological: Does not bruise/bleed easily  Psychiatric/Behavioral: Negative for confusion, hallucinations and suicidal ideas  Objective:      /80   Pulse 66   Ht 6' (1 829 m)   Wt 122 kg (268 lb)   SpO2 95%   BMI 36 35 kg/m²     Below is the patient's most recent value for Albumin, ALT, AST, BUN, Calcium, Chloride, Cholesterol, CO2, Creatinine, GFR, Glucose, HDL, Hematocrit, Hemoglobin, Hemoglobin A1C, LDL, Magnesium, Phosphorus, Platelets, Potassium, PSA, Sodium, Triglycerides, and WBC     Lab Results   Component Value Date    ALT 27 03/03/2021    AST 16 03/03/2021    BUN 16 04/08/2022    CALCIUM 9 0 04/08/2022     04/08/2022    CHOL 152 03/21/2017    CO2 28 04/08/2022    CREATININE 1 09 04/08/2022    HDL 43 03/03/2021    HCT 46 0 04/08/2022    HGB 14 8 04/08/2022    HGBA1C 5 6 03/03/2021     04/08/2022 K 4 1 04/08/2022    PSA 0 7 03/03/2021     03/21/2017    TRIG 113 03/03/2021    WBC 5 55 04/08/2022     Note: for a comprehensive list of the patient's lab results, access the Results Review activity  Physical Exam  Vitals and nursing note reviewed  Constitutional:       General: He is not in acute distress  Appearance: He is well-developed  HENT:      Head: Normocephalic and atraumatic  Eyes:      General:         Right eye: No discharge  Left eye: No discharge  Neck:      Vascular: No JVD  Cardiovascular:      Rate and Rhythm: Normal rate and regular rhythm  Pulmonary:      Effort: Pulmonary effort is normal  No respiratory distress  Abdominal:      General: There is no distension  Palpations: Abdomen is soft  Tenderness: There is no abdominal tenderness  There is no guarding  Genitourinary:     Comments: Left IH reducible  +hydrocele/scrotal swelling  Musculoskeletal:         General: Normal range of motion  Cervical back: Normal range of motion and neck supple  Skin:     General: Skin is warm and dry  Neurological:      Mental Status: He is alert and oriented to person, place, and time  Psychiatric:         Mood and Affect: Mood normal          Behavior: Behavior normal          Thought Content:  Thought content normal          Judgment: Judgment normal              Procedures

## 2022-04-08 NOTE — TELEPHONE ENCOUNTER
Good Afternoon Dr. Reyes,    I have a Dr. Yadav patient needs pre-op clearance prior to 4/21/22.  You have a 8:20 on 4/13, are you willing to see this patient ?

## 2022-04-08 NOTE — H&P (VIEW-ONLY)
Assessment/Plan:     1  Left inguinal hernia  -     Ambulatory Referral to General Surgery  -     Case request operating room: REPAIR HERNIA INGUINAL, HYDROCELECTOMY; Standing  -     CBC and differential; Future  -     EKG 12 lead; Future  -     XR chest pa & lateral; Future; Expected date: 04/08/2022  -     Basic metabolic panel; Future  -     Case request operating room: REPAIR HERNIA INGUINAL, HYDROCELECTOMY    2  Obesity, morbid ([facility] [address] )        We discussed the risks and benefits of open inguinal hernia repair and hydrocelectomy including but not limited to mesh placement to decrease risk of recurrence, risk of bleeding, infection, damage to nerves or other structures, acute and chronic pain, recurrence, reactions to medications  Questions answered and consent signed  Subjective:      Patient ID: Karon Ch is a 66 y o  male  Triage Notes:    Mr Ginger Gonzalez is a 67 yo M presenting with a LIH that has been present a few years  Has some intermittent testicular swelling and there is some mild discomfort  Abdominal surgical history includes cholecystectomy and umbilical hernia repair  The following portions of the patient's history were reviewed and updated as appropriate:   He  has a past medical history of Cardiac disease, Fungal dermatitis, Hyperlipidemia, Hypertension, MRSA (methicillin resistant Staphylococcus aureus) infection, Post traumatic stress disorder (PTSD), and Psychiatric disorder    He   Patient Active Problem List    Diagnosis Date Noted    BMI 35 0-35 9,adult 04/26/2021    Erectile dysfunction due to arterial insufficiency 04/26/2021    Obesity, morbid ([address] ) 04/26/2021    Gastroesophageal reflux disease without esophagitis 10/23/2020    SOB (shortness of breath)     BPH with obstruction/lower urinary tract symptoms 05/29/2020    Hematospermia 05/29/2020    Scrotal swelling 05/29/2020    Hydrocele 01/30/2020    Tension headache 09/16/2019    Cervicalgia 09/16/2019    Stenosis of left carotid artery 09/16/2019    Seborrheic keratosis 05/28/2019    Diarrhea 01/09/2019    Multiple allergies 01/09/2019    Elevated blood sugar 04/25/2018    Post traumatic stress disorder 03/06/2016    Mixed hyperlipidemia 03/06/2016    Essential hypertension 03/06/2016     He  has a past surgical history that includes Total knee arthroplasty (Bilateral, 2014); Total hip arthroplasty (Bilateral, 2009, 2010); Hernia repair (N/A, unknown, supraumbilical); CHOLECYSTECTOMY LAPAROSCOPIC (N/A, 3/7/2016); Joint replacement (Bilateral); Cholecystectomy; Colonoscopy; pr colonoscopy flx dx w/collj spec when pfrmd (N/A, 1/9/2018); and Cataract extraction  His family history includes Aortic aneurysm in his father; Coronary artery disease in his father, paternal grandfather, and paternal grandmother; Diabetes in his mother; Heart disease in his father  He  reports that he quit smoking about 16 years ago  His smoking use included cigarettes  He started smoking about 58 years ago  He has a 80 00 pack-year smoking history  He has quit using smokeless tobacco  He reports that he does not drink alcohol and does not use drugs  Current Outpatient Medications on File Prior to Visit   Medication Sig    famotidine (PEPCID) 40 MG tablet Take 1 tablet (40 mg total) by mouth daily before dinner    quinapril (ACCUPRIL) 10 mg tablet TAKE 1 TABLET BY MOUTH EVERY DAY    RESTASIS 0 05 % ophthalmic emulsion INSTILL 1 DROP INTO BOTH EYES TWICE A DAY    simvastatin (ZOCOR) 80 mg tablet Take 80 mg by mouth daily at bedtime      traZODone (DESYREL) 50 mg tablet Take 50 mg by mouth daily at bedtime    albuterol (Ventolin HFA) 90 mcg/act inhaler Inhale 2 puffs every 6 (six) hours as needed for wheezing (Patient not taking: Reported on 11/22/2021 )    alfuzosin (UROXATRAL) 10 mg 24 hr tablet Take 1 tablet (10 mg total) by mouth daily (Patient not taking: Reported on 4/8/2022 )    omeprazole (PriLOSEC) 40 MG capsule Take 1 capsule (40 mg total) by mouth daily (Patient not taking: Reported on 11/22/2021 )    sildenafil (REVATIO) 20 mg tablet Take 3 tablets (60 mg total) by mouth daily as needed (erectile dysfunction)     No current facility-administered medications on file prior to visit  He is allergic to morphine and related       Review of Systems   Constitutional: Negative for appetite change, chills, fever and unexpected weight change  HENT: Negative for hearing loss, sore throat, trouble swallowing and voice change  Eyes: Negative for visual disturbance  Respiratory: Negative for cough, chest tightness, shortness of breath and wheezing  Cardiovascular: Negative for chest pain and palpitations  Gastrointestinal: Negative for abdominal distention and blood in stool  Endocrine: Negative for cold intolerance and heat intolerance  Genitourinary: Positive for scrotal swelling  Negative for difficulty urinating  Musculoskeletal: Negative for gait problem  Skin: Negative for color change and rash  Neurological: Negative for dizziness, speech difficulty, light-headedness and numbness  Hematological: Does not bruise/bleed easily  Psychiatric/Behavioral: Negative for confusion, hallucinations and suicidal ideas  Objective:      /80   Pulse 66   Ht 6' (1 829 m)   Wt 122 kg (268 lb)   SpO2 95%   BMI 36 35 kg/m²     Below is the patient's most recent value for Albumin, ALT, AST, BUN, Calcium, Chloride, Cholesterol, CO2, Creatinine, GFR, Glucose, HDL, Hematocrit, Hemoglobin, Hemoglobin A1C, LDL, Magnesium, Phosphorus, Platelets, Potassium, PSA, Sodium, Triglycerides, and WBC     Lab Results   Component Value Date    ALT 27 03/03/2021    AST 16 03/03/2021    BUN 16 04/08/2022    CALCIUM 9 0 04/08/2022     04/08/2022    CHOL 152 03/21/2017    CO2 28 04/08/2022    CREATININE 1 09 04/08/2022    HDL 43 03/03/2021    HCT 46 0 04/08/2022    HGB 14 8 04/08/2022    HGBA1C 5 6 03/03/2021     04/08/2022 K 4 1 04/08/2022    PSA 0 7 03/03/2021     03/21/2017    TRIG 113 03/03/2021    WBC 5 55 04/08/2022     Note: for a comprehensive list of the patient's lab results, access the Results Review activity  Physical Exam  Vitals and nursing note reviewed  Constitutional:       General: He is not in acute distress  Appearance: He is well-developed  HENT:      Head: Normocephalic and atraumatic  Eyes:      General:         Right eye: No discharge  Left eye: No discharge  Neck:      Vascular: No JVD  Cardiovascular:      Rate and Rhythm: Normal rate and regular rhythm  Pulmonary:      Effort: Pulmonary effort is normal  No respiratory distress  Abdominal:      General: There is no distension  Palpations: Abdomen is soft  Tenderness: There is no abdominal tenderness  There is no guarding  Genitourinary:     Comments: Left IH reducible  +hydrocele/scrotal swelling  Musculoskeletal:         General: Normal range of motion  Cervical back: Normal range of motion and neck supple  Skin:     General: Skin is warm and dry  Neurological:      Mental Status: He is alert and oriented to person, place, and time  Psychiatric:         Mood and Affect: Mood normal          Behavior: Behavior normal          Thought Content:  Thought content normal          Judgment: Judgment normal              Procedures

## 2022-04-08 NOTE — TELEPHONE ENCOUNTER
Dolores from General Surgery called and stated that pt will be going for surgery for REPAIR HERNIA INGUINAL.       Pt was last seen by Dr. Shields on 11/22/21. Please Advise if pt needs an appointment to be cleared for surgery.      Dolores would like a call back at [phone]       Pt will be leaving [facility] on May 13, so surgery is schedule for 4/21/22

## 2022-04-08 NOTE — TELEPHONE ENCOUNTER
Dr Yadav stated the patient will need to be seen for clearance.    Please call patient to schedule appointment.

## 2022-04-13 ENCOUNTER — OFFICE VISIT (OUTPATIENT)
Dept: CARDIOLOGY CLINIC | Facility: CLINIC | Age: 72
End: 2022-04-13
Payer: MEDICARE

## 2022-04-13 VITALS
OXYGEN SATURATION: 96 % | DIASTOLIC BLOOD PRESSURE: 70 MMHG | SYSTOLIC BLOOD PRESSURE: 132 MMHG | RESPIRATION RATE: 16 BRPM | BODY MASS INDEX: 36.16 KG/M2 | HEART RATE: 72 BPM | HEIGHT: 72 IN | WEIGHT: 267 LBS

## 2022-04-13 DIAGNOSIS — I10 HYPERTENSION, UNSPECIFIED TYPE: Primary | ICD-10-CM

## 2022-04-13 PROCEDURE — 99214 OFFICE O/P EST MOD 30 MIN: CPT | Performed by: INTERNAL MEDICINE

## 2022-04-13 NOTE — PROGRESS NOTES
Cardiology Follow Up    Vinayak Robertson  1950  [id number]  [facility] [place]  [place] [address] [place] [address] [address] [place] [address] [phone]    Discussion/Summary:  1  Preop cardiovascular risk assessment  2  Dyslipidemia  3  Hypertension    Patient presents for preoperative cardiovascular risk assessment  Patient  is scheduled for hernia surgery  Activity level: Patient is very active  He is easily able to complete greater than 4 METS of activity  There is no sign or symptoms of acute coronary syndrome, acute heart failure, valvular heart disease or uncontrolled arrhythmias  Patient is at low-to-moderate risk of perioperative cardiovascular complications based on his age, comorbidities, activity level and nature of upcoming surgery  No further work up would meaningfully change his perioperative risk  Blood pressure is well controlled  Continue current medications  Continue with statin therapy    Previous studies were reviewed  Safety measures were reviewed  Questions were entertained and answered  Patient was advised to report any problems requiring medical attention  Follow-up with PCP and appropriate specialist and lab work as discussed  Return for follow up visit as scheduled or earlier, if needed  Thank you for allowing me to participate in the care and evaluation of your patient  Should you have any questions, please feel free to contact me  History of Present Illness:     Vinayak Robertson is a 66 y o  male who presents for follow up for cardiovascular care  Denies chest pain, chest pressure, shortness of breath, dizziness, lightheadedness, presyncope or syncope  Denies orthopnea, PND or lower limb edema  He plays golf once or twice a week  Played 18 holes last Tuesday  No exertional symptoms  He has two flights of stairs at home  No symptoms       He was seen in 2/2020 with atypical chest pain  Had stress test done and subsequently had a cath performed  Had no significant epicardial CAD  Ex smoker  Quit 18 years ago  He is retired  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    Cardiac cath 3/10/20:    CORONARY CIRCULATION:  The coronary circulation is left dominant  Left main: The vessel was normal sized  Angiography showed no evidence of disease  LAD: The vessel was normal sized  Angiography showed minor luminal irregularities  diagonals are small vessel  Circumflex: The vessel was large sized  Angiography showed no evidence of disease  OM1/LPL and LPDA are large caliber long vessel  RCA: The vessel was small (non-dominant)  Angiography showed minor luminal irregularities  CARDIAC STRUCTURES:  There was no aortic stenosis  HEMODYNAMICS:  Hemodynamic assessment demonstrated normal LVEDP  Nuclear 22/26/2020:    IMPRESSIONS: Abnormal study  Small to moderate sized, medium intensity defect involving the basal and mid inferior wall and basal lateral wall with some reversibility which could represent ischemia  Left ventricular systolic function was  mildly reduced, with regional wall motion abnormalities  2/26/2020:  LEFT VENTRICLE:  Ejection fraction was estimated to be 55 %  There were no regional wall motion abnormalities  Doppler parameters were consistent with abnormal left ventricular relaxation (grade 1 diastolic dysfunction)  RIGHT VENTRICLE:  Estimated peak pressure was at least 25 mmHg  MITRAL VALVE:  There was mild annular calcification  There was mild regurgitation  AORTIC VALVE:  There was mild regurgitation    TRICUSPID VALVE:  There was mild to moderate regurgitation        4/8/2020:  ECG: NSR    Patient Active Problem List   Diagnosis    Post traumatic stress disorder    Mixed hyperlipidemia    Essential hypertension    Elevated blood sugar    Diarrhea    Multiple allergies    Seborrheic keratosis    Tension headache    Cervicalgia    Stenosis of left carotid artery    Hydrocele    BPH with obstruction/lower urinary tract symptoms    Hematospermia    Scrotal swelling    SOB (shortness of breath)    Gastroesophageal reflux disease without esophagitis    BMI 35 0-35 9,adult    Erectile dysfunction due to arterial insufficiency    Obesity, morbid ([facility] [address] )     Past Medical History:   Diagnosis Date    Cardiac disease     Fungal dermatitis     Hyperlipidemia     Hypertension     per [employer]: Essential hypertension ith goal blood pressure less than 130/85;  Resolved:17    MRSA (methicillin resistant Staphylococcus aureus) infection     Post traumatic stress disorder (PTSD)     Psychiatric disorder      Social History     Socioeconomic History    Marital status: /Civil Union     Spouse name: Not on file    Number of children: Not on file    Years of education: Not on file    Highest education level: Not on file   Occupational History    Occupation: Retired   Tobacco Use    Smoking status: Former Smoker     Packs/day: 2 00     Years: 40 00     Pack years: 80 00     Types: Cigarettes     Start date:      Quit date:      Years since quittin 2    Smokeless tobacco: Former User   Vaping Use    Vaping Use: Never used   Substance and Sexual Activity    Alcohol use: No    Drug use: No    Sexual activity: Not on file   Other Topics Concern    Not on file   Social History Narrative    Daily caffeine consumption     Social Determinants of Health     Financial Resource Strain: Not on file   Food Insecurity: Not on file   Transportation Needs: Not on file   Physical Activity: Not on file   Stress: Not on file   Social Connections: Not on file   Intimate Partner Violence: Not on file   Housing Stability: Not on file      Family History   Problem Relation Age of Onset    Diabetes Mother     Heart disease Father     Coronary artery disease Father     Aortic aneurysm Father         Ruptured abdominal aortic aneurysm    Coronary artery disease Paternal Grandmother     Coronary artery disease Paternal Grandfather      Past Surgical History:   Procedure Laterality Date    CATARACT EXTRACTION      CHOLECYSTECTOMY      CHOLECYSTECTOMY LAPAROSCOPIC N/A 3/7/2016    Procedure: CHOLECYSTECTOMY LAPAROSCOPIC;  Surgeon: Mik Beasley DO;  [facility];  Service:     COLONOSCOPY      HERNIA REPAIR N/A unknown, supraumbilical    JOINT REPLACEMENT Bilateral     hips and knees    DE COLONOSCOPY FLX DX W/COLLJ SPEC WHEN PFRMD N/A 1/9/2018    Procedure: COLONOSCOPY;  Surgeon: Heriberto Muñoz MD;  Location: [facility]; Service: Gastroenterology    TOTAL HIP ARTHROPLASTY Bilateral 2009, 2010    TOTAL KNEE ARTHROPLASTY Bilateral 2014       Current Outpatient Medications:     alfuzosin (UROXATRAL) 10 mg 24 hr tablet, Take 1 tablet (10 mg total) by mouth daily, Disp: 90 tablet, Rfl: 1    famotidine (PEPCID) 40 MG tablet, Take 1 tablet (40 mg total) by mouth daily before dinner, Disp: 90 tablet, Rfl: 3    quinapril (ACCUPRIL) 10 mg tablet, TAKE 1 TABLET BY MOUTH EVERY DAY, Disp: 90 tablet, Rfl: 0    RESTASIS 0 05 % ophthalmic emulsion, INSTILL 1 DROP INTO BOTH EYES TWICE A DAY, Disp: , Rfl: 3    simvastatin (ZOCOR) 80 mg tablet, Take 80 mg by mouth daily at bedtime  , Disp: , Rfl:     traZODone (DESYREL) 50 mg tablet, Take 50 mg by mouth daily at bedtime, Disp: , Rfl:     albuterol (Ventolin HFA) 90 mcg/act inhaler, Inhale 2 puffs every 6 (six) hours as needed for wheezing (Patient not taking: Reported on 11/22/2021 ), Disp: 18 g, Rfl: 1    omeprazole (PriLOSEC) 40 MG capsule, Take 1 capsule (40 mg total) by mouth daily (Patient not taking: Reported on 11/22/2021 ), Disp: 90 capsule, Rfl: 3    sildenafil (REVATIO) 20 mg tablet, Take 3 tablets (60 mg total) by mouth daily as needed (erectile dysfunction), Disp: 30 tablet, Rfl: 0  Allergies   Allergen Reactions    Morphine And Related GI Intolerance       Labs:  Lab Results Component Value Date    WBC 5 55 04/08/2022    HGB 14 8 04/08/2022    HCT 46 0 04/08/2022    MCV 97 04/08/2022     04/08/2022     Lab Results   Component Value Date    CALCIUM 9 0 04/08/2022     03/21/2017    K 4 1 04/08/2022    CO2 28 04/08/2022     04/08/2022    BUN 16 04/08/2022    CREATININE 1 09 04/08/2022     Lab Results   Component Value Date    HGBA1C 5 6 03/03/2021     Lab Results   Component Value Date    CHOL 152 03/21/2017     Lab Results   Component Value Date    HDL 43 03/03/2021    HDL 48 01/14/2020    HDL 51 10/10/2019     Lab Results   Component Value Date    LDLCALC 89 03/03/2021    LDLCALC 80 01/14/2020    LDLCALC 77 10/10/2019     Lab Results   Component Value Date    TRIG 113 03/03/2021    TRIG 93 01/14/2020    TRIG 80 10/10/2019     No results found for: CHOLHDL    Review of Systems:  Review of Systems   Constitutional: Negative  Negative for activity change, appetite change, fatigue and fever  Respiratory: Negative for chest tightness and shortness of breath  Cardiovascular: Negative for chest pain, palpitations and leg swelling  Gastrointestinal: Negative for nausea and vomiting  Neurological: Negative for dizziness, syncope, weakness and light-headedness  Hematological: Negative for adenopathy  All other systems reviewed and are negative  Physical Exam:  Physical Exam  Vitals and nursing note reviewed  Constitutional:       General: He is not in acute distress  Appearance: Normal appearance  He is not ill-appearing or diaphoretic  HENT:      Head: Normocephalic and atraumatic  Eyes:      Extraocular Movements: Extraocular movements intact  Cardiovascular:      Rate and Rhythm: Normal rate and regular rhythm  Heart sounds: No murmur heard  No friction rub  No gallop  Pulmonary:      Effort: Pulmonary effort is normal  No respiratory distress  Breath sounds: Normal breath sounds  Abdominal:      Palpations: Abdomen is soft  Musculoskeletal:         General: No swelling  Normal range of motion  Skin:     General: Skin is warm and dry  Capillary Refill: Capillary refill takes less than 2 seconds  Coloration: Skin is not pale  Neurological:      General: No focal deficit present  Mental Status: He is alert and oriented to person, place, and time     Psychiatric:         Mood and Affect: Mood normal

## 2022-04-16 DIAGNOSIS — I10 ESSENTIAL HYPERTENSION: ICD-10-CM

## 2022-04-16 RX ORDER — QUINAPRIL 10 MG/1
TABLET ORAL
Qty: 90 TABLET | Refills: 0 | Status: SHIPPED | OUTPATIENT
Start: 2022-04-16 | End: 2022-05-19 | Stop reason: SDUPTHER

## 2022-04-18 RX ORDER — MULTIVITAMIN
1 CAPSULE ORAL DAILY
COMMUNITY

## 2022-04-18 NOTE — PRE-PROCEDURE INSTRUCTIONS
Pre-Surgery Instructions:   Medication Instructions    alfuzosin (UROXATRAL) 10 mg 24 hr tablet take PM before sx    famotidine (PEPCID) 40 MG tablet take pm before sx    Multiple Vitamin (multivitamin) capsule Stop taking 7 days prior to surgery   quinapril (ACCUPRIL) 10 mg tablet Hold day of surgery   RESTASIS 0 05 % ophthalmic emulsion Take day of surgery   sildenafil (REVATIO) 20 mg tablet Hold day of surgery   simvastatin (ZOCOR) 80 mg tablet take pm 4-20-22    traZODone (DESYREL) 50 mg tablet take pm 4-20-22   Pre op and bathing instructions reviewed  Pt has hibiclens  Pt  Verbalized understanding of current visitor restrictions  Pt  Verbalized an understanding of all instructions reviewed and offers no concerns at this time  Instructed to avoid all ASA/NSAIDs and OTC Vit/Supp from now until after surgery per anesthesia guidelines   Tylenol ok prn

## 2022-04-21 ENCOUNTER — ANESTHESIA (OUTPATIENT)
Dept: PERIOP | Facility: HOSPITAL | Age: 72
End: 2022-04-21
Payer: MEDICARE

## 2022-04-21 ENCOUNTER — HOSPITAL ENCOUNTER (OUTPATIENT)
Facility: HOSPITAL | Age: 72
Setting detail: OUTPATIENT SURGERY
Discharge: HOME | End: 2022-04-21
Attending: SURGERY | Admitting: SURGERY
Payer: MEDICARE

## 2022-04-21 ENCOUNTER — ANESTHESIA EVENT (OUTPATIENT)
Dept: PERIOP | Facility: HOSPITAL | Age: 72
End: 2022-04-21
Payer: MEDICARE

## 2022-04-21 VITALS
OXYGEN SATURATION: 93 % | BODY MASS INDEX: 35.77 KG/M2 | HEIGHT: 72 IN | SYSTOLIC BLOOD PRESSURE: 130 MMHG | HEART RATE: 66 BPM | WEIGHT: 264.11 LBS | RESPIRATION RATE: 18 BRPM | DIASTOLIC BLOOD PRESSURE: 70 MMHG | TEMPERATURE: 97.2 F

## 2022-04-21 DIAGNOSIS — K40.90 LEFT INGUINAL HERNIA: ICD-10-CM

## 2022-04-21 PROBLEM — J44.9 CHRONIC OBSTRUCTIVE PULMONARY DISEASE (HCC): Status: ACTIVE | Noted: 2022-04-21

## 2022-04-21 PROCEDURE — 55520 REMOVAL OF SPERM CORD LESION: CPT | Performed by: SURGERY

## 2022-04-21 PROCEDURE — 88302 TISSUE EXAM BY PATHOLOGIST: CPT | Performed by: PATHOLOGY

## 2022-04-21 PROCEDURE — 55520 REMOVAL OF SPERM CORD LESION: CPT | Performed by: PHYSICIAN ASSISTANT

## 2022-04-21 RX ORDER — PROPOFOL 10 MG/ML
INJECTION, EMULSION INTRAVENOUS AS NEEDED
Status: DISCONTINUED | OUTPATIENT
Start: 2022-04-21 | End: 2022-04-21

## 2022-04-21 RX ORDER — MAGNESIUM HYDROXIDE 1200 MG/15ML
LIQUID ORAL AS NEEDED
Status: DISCONTINUED | OUTPATIENT
Start: 2022-04-21 | End: 2022-04-21 | Stop reason: HOSPADM

## 2022-04-21 RX ORDER — HYDROCODONE BITARTRATE AND ACETAMINOPHEN 5; 325 MG/1; MG/1
1 TABLET ORAL EVERY 6 HOURS PRN
Qty: 14 TABLET | Refills: 0 | Status: SHIPPED | OUTPATIENT
Start: 2022-04-21 | End: 2022-05-01

## 2022-04-21 RX ORDER — SODIUM CHLORIDE, SODIUM LACTATE, POTASSIUM CHLORIDE, CALCIUM CHLORIDE 600; 310; 30; 20 MG/100ML; MG/100ML; MG/100ML; MG/100ML
125 INJECTION, SOLUTION INTRAVENOUS CONTINUOUS
Status: DISCONTINUED | OUTPATIENT
Start: 2022-04-21 | End: 2022-04-21 | Stop reason: HOSPADM

## 2022-04-21 RX ORDER — DOCUSATE SODIUM 100 MG/1
100 CAPSULE, LIQUID FILLED ORAL 2 TIMES DAILY
Qty: 10 CAPSULE | Refills: 0 | Status: SHIPPED | OUTPATIENT
Start: 2022-04-21

## 2022-04-21 RX ORDER — CEFAZOLIN SODIUM 2 G/50ML
2000 SOLUTION INTRAVENOUS ONCE
Status: COMPLETED | OUTPATIENT
Start: 2022-04-21 | End: 2022-04-21

## 2022-04-21 RX ORDER — FENTANYL CITRATE 50 UG/ML
INJECTION, SOLUTION INTRAMUSCULAR; INTRAVENOUS AS NEEDED
Status: DISCONTINUED | OUTPATIENT
Start: 2022-04-21 | End: 2022-04-21

## 2022-04-21 RX ORDER — DEXAMETHASONE SODIUM PHOSPHATE 10 MG/ML
INJECTION, SOLUTION INTRAMUSCULAR; INTRAVENOUS AS NEEDED
Status: DISCONTINUED | OUTPATIENT
Start: 2022-04-21 | End: 2022-04-21

## 2022-04-21 RX ORDER — FENTANYL CITRATE/PF 50 MCG/ML
25 SYRINGE (ML) INJECTION
Status: DISCONTINUED | OUTPATIENT
Start: 2022-04-21 | End: 2022-04-21 | Stop reason: HOSPADM

## 2022-04-21 RX ORDER — EPHEDRINE SULFATE 50 MG/ML
INJECTION INTRAVENOUS AS NEEDED
Status: DISCONTINUED | OUTPATIENT
Start: 2022-04-21 | End: 2022-04-21

## 2022-04-21 RX ORDER — OXYCODONE HYDROCHLORIDE 5 MG/1
5 TABLET ORAL EVERY 4 HOURS PRN
Status: DISCONTINUED | OUTPATIENT
Start: 2022-04-21 | End: 2022-04-21 | Stop reason: HOSPADM

## 2022-04-21 RX ORDER — LIDOCAINE HYDROCHLORIDE 10 MG/ML
INJECTION, SOLUTION EPIDURAL; INFILTRATION; INTRACAUDAL; PERINEURAL AS NEEDED
Status: DISCONTINUED | OUTPATIENT
Start: 2022-04-21 | End: 2022-04-21

## 2022-04-21 RX ORDER — MIDAZOLAM HYDROCHLORIDE 2 MG/2ML
INJECTION, SOLUTION INTRAMUSCULAR; INTRAVENOUS AS NEEDED
Status: DISCONTINUED | OUTPATIENT
Start: 2022-04-21 | End: 2022-04-21

## 2022-04-21 RX ORDER — SODIUM CHLORIDE, SODIUM LACTATE, POTASSIUM CHLORIDE, CALCIUM CHLORIDE 600; 310; 30; 20 MG/100ML; MG/100ML; MG/100ML; MG/100ML
20 INJECTION, SOLUTION INTRAVENOUS CONTINUOUS
Status: DISCONTINUED | OUTPATIENT
Start: 2022-04-21 | End: 2022-04-21 | Stop reason: HOSPADM

## 2022-04-21 RX ORDER — ONDANSETRON 2 MG/ML
4 INJECTION INTRAMUSCULAR; INTRAVENOUS ONCE AS NEEDED
Status: DISCONTINUED | OUTPATIENT
Start: 2022-04-21 | End: 2022-04-21 | Stop reason: HOSPADM

## 2022-04-21 RX ORDER — ONDANSETRON 2 MG/ML
INJECTION INTRAMUSCULAR; INTRAVENOUS AS NEEDED
Status: DISCONTINUED | OUTPATIENT
Start: 2022-04-21 | End: 2022-04-21

## 2022-04-21 RX ADMIN — LIDOCAINE HYDROCHLORIDE 50 MG: 10 INJECTION, SOLUTION EPIDURAL; INFILTRATION; INTRACAUDAL; PERINEURAL at 10:19

## 2022-04-21 RX ADMIN — SODIUM CHLORIDE, SODIUM LACTATE, POTASSIUM CHLORIDE, AND CALCIUM CHLORIDE 125 ML/HR: .6; .31; .03; .02 INJECTION, SOLUTION INTRAVENOUS at 09:48

## 2022-04-21 RX ADMIN — DEXAMETHASONE SODIUM PHOSPHATE 10 MG: 10 INJECTION, SOLUTION INTRAMUSCULAR; INTRAVENOUS at 10:25

## 2022-04-21 RX ADMIN — ONDANSETRON 4 MG: 2 INJECTION INTRAMUSCULAR; INTRAVENOUS at 10:25

## 2022-04-21 RX ADMIN — OXYCODONE HYDROCHLORIDE 5 MG: 5 TABLET ORAL at 13:20

## 2022-04-21 RX ADMIN — FENTANYL CITRATE 25 MCG: 50 INJECTION, SOLUTION INTRAMUSCULAR; INTRAVENOUS at 10:25

## 2022-04-21 RX ADMIN — FENTANYL CITRATE 25 MCG: 50 INJECTION, SOLUTION INTRAMUSCULAR; INTRAVENOUS at 11:54

## 2022-04-21 RX ADMIN — FENTANYL CITRATE 25 MCG: 50 INJECTION, SOLUTION INTRAMUSCULAR; INTRAVENOUS at 11:26

## 2022-04-21 RX ADMIN — EPHEDRINE SULFATE 10 MG: 50 INJECTION, SOLUTION INTRAVENOUS at 11:16

## 2022-04-21 RX ADMIN — CEFAZOLIN SODIUM 2000 MG: 2 SOLUTION INTRAVENOUS at 10:07

## 2022-04-21 RX ADMIN — FENTANYL CITRATE 25 MCG: 50 INJECTION, SOLUTION INTRAMUSCULAR; INTRAVENOUS at 10:37

## 2022-04-21 RX ADMIN — MIDAZOLAM HYDROCHLORIDE 2 MG: 1 INJECTION, SOLUTION INTRAMUSCULAR; INTRAVENOUS at 10:12

## 2022-04-21 RX ADMIN — PROPOFOL 200 MG: 10 INJECTION, EMULSION INTRAVENOUS at 10:19

## 2022-04-21 NOTE — INTERVAL H&P NOTE
H&P reviewed  After examining the patient I find no changes in the patients condition since the H&P had been written  Marked and reconsented for open repair to include hydrocelectomy      Vitals:    04/21/22 0932   BP: 147/79   Pulse: 65   Resp: 18   Temp: 97 7 °F (36 5 °C)   SpO2: 95%

## 2022-04-21 NOTE — ANESTHESIA PREPROCEDURE EVALUATION
Procedure:  REPAIR HERNIA INGUINAL (Left Groin)  HYDROCELECTOMY (Left Scrotum)    Relevant Problems   CARDIO   (+) Essential hypertension   (+) Mixed hyperlipidemia      GI/HEPATIC   (+) Gastroesophageal reflux disease without esophagitis      /RENAL   (+) BPH with obstruction/lower urinary tract symptoms      NEURO/PSYCH   (+) Post traumatic stress disorder   (+) Tension headache      PULMONARY   (+) Chronic obstructive pulmonary disease (HCC)      Other   (+) BMI 35 0-35 9,adult   (+) Hydrocele   seen by cardiology  H/o abnormal stress test 2020 with subsequent cath showing no significant CAD    Physical Exam    Airway    Mallampati score: II  TM Distance: >3 FB  Neck ROM: full     Dental   Comment: Denies loose teeth,     Cardiovascular  Cardiovascular exam normal    Pulmonary  Pulmonary exam normal     Other Findings  Portions of exam deferred due to low yield and/or unknown COVID status      Anesthesia Plan  ASA Score- 3     Anesthesia Type- general with ASA Monitors  Additional Monitors:   Airway Plan: LMA  Plan Factors-Exercise tolerance (METS): >4 METS  Chart reviewed  Existing labs reviewed  Patient summary reviewed  Patient is not a current smoker  Induction- intravenous  Postoperative Plan-     Informed Consent- Anesthetic plan and risks discussed with patient  I personally reviewed this patient with the CRNA  Discussed and agreed on the Anesthesia Plan with the CRNA  Manolo Mujica

## 2022-04-21 NOTE — OP NOTE
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]    Procedure(s) (LRB):  EXCISION OF CORD LIPOMA , GROIN EXPLORATION (Left)    Specimen(s):  ID Type Source Tests Collected by Time Destination   1 : cord lipoma  Tissue Lipoma of cord TISSUE EXAM Patti Munoz MD 2022 [id number]        Estimated Blood Loss:   Minimal    Drains:  * No LDAs found *    Anesthesia Type:   General/LMA    Operative Indications:  Left inguinal hernia [K40 90]      Operative Findings:  Very large cord lipoma (<15cm) extending through the inguinal canal and into the scrotum     Complications:   None    Procedure and Technique:    The patient was seen again in the Holding Room  The risks, benefits, complications, treatment options, and expected outcomes were discussed with the patient)  The possibilities of bleeding, infection, hernia recurrence, nerve injury and acute and chronic pain, testicular vessel injury and testicular loss were explained  There was concurrence with the proposed plan, and informed consent was obtained  The site of surgery was properly noted and marked  The patient was taken to the Operating Room, identified as Erick Villagran, and the procedure verified as left inguinal hernia repair with hydrocelectomy and antibiotics were confirmed  A Time Out was held and the above information confirmed  The patient was placed in the supine position and underwent induction of anesthesia, the lower abdomen and groin was prepped and draped in the standard fashion  The pubic tubercle and ASIS were marked and a 6 cm shruthi was made along that line   A mixture of 0 25% Marcaine and 1% lidocaine was used to anesthetize the skin and incision was made with a 15 blade  Dissection was carried through the soft tissue including Campers and Scarpas and the superficial epigastric vein was ligated using hemostats and 2-0 vicryl ties  This was then carried down and the cord structures were encountered before the external oblique which was not seen initially  We proceeded to explore the groin and cord which was difficult due to habitus and the size of the cord lipoma  What was thought to be an inguinal hernia with large hydrocele was a large cord lipoma and extremely small hydrocele with no hernia sac seen  Neither in the direct or indirect position  Thus a mesh was not placed  The inguinal canal was irrigated  The defect was exposed and a piece of 7 5 x 15 bard soft polypropylene mesh was trimmed to size and placed over the defect  2-0 Prolene suture was then used in an interrupted fashion to place the mesh with the suture being sewn from the pubic tubercle inferiorly and superiorly along the canal to a level just beyond the internal ring  Scarpas was closed with interrupted 3-0 vicryl and deep dermals were placed  A 4-0 monocryl was used to run a subcuticular skin suture and covered with histoacryl  Instrument, sponge, and needle counts were correct prior to closure and at the conclusion of the case     I was present for the entire procedure, A qualified resident physician was not available and A physician assistant was required during the procedure for retraction tissue handling,dissection and suturing    Patient Disposition:  PACU  and extubated and stable      SIGNATURE: Magda Payan MD  DATE: April 21, 2022  TIME: 11:48 AM

## 2022-04-21 NOTE — ANESTHESIA POSTPROCEDURE EVALUATION
Post-Op Assessment Note    CV Status:  Stable    Pain management: adequate     Mental Status:  Alert and arousable   Hydration Status:  Euvolemic   PONV Controlled:  Controlled   Airway Patency:  Patent      Post Op Vitals Reviewed: Yes      Staff: CRNA         No complications documented      BP   134/70   Temp 97 7   Pulse 79   Resp 16   SpO2 95

## 2022-04-21 NOTE — DISCHARGE INSTRUCTIONS
After Your Surgery   AMBULATORY CARE:     Call 911 for any of the following:   · You feel lightheaded, short of breath, and have chest pain  · You cough up blood  · You have trouble breathing  Seek care immediately if:   · Your arm or leg feels warm, tender, and painful  It may look swollen and red  · Blood soaks through your bandage  · Your abdomen or groin feels hard and looks bigger than usual      · Your bruise suddenly gets bigger  · Your bowel movements are black, bloody, or tarry-looking  Contact your healthcare provider if:   · You have a fever above 101°F      · You develop a skin rash, hives, or itching  · Your incision is swollen, red, or draining pus or fluid  · You have nausea, or you are vomiting  · You cannot have a bowel movement  · You have trouble urinating  · Your pain does not get better after you take pain medicine  · You have questions or concerns about your condition or care  Medicines: You may need any of the following:  · Prescription pain medicine  may be given  Ask your healthcare provider how to take this medicine safely  Some prescription pain medicines contain acetaminophen  Do not take other medicines that contain acetaminophen without talking to your healthcare provider  Too much acetaminophen may cause liver damage  Prescription pain medicine may cause constipation  Ask your healthcare provider how to prevent or treat constipation  · NSAIDs , such as ibuprofen, help decrease swelling, pain, and fever  This medicine is available with or without a doctor's order  NSAIDs can cause stomach bleeding or kidney problems in certain people  DO NOT TAKE ON AN EMPTY STOMACH  If you take blood thinner medicine, always ask your healthcare provider if NSAIDs are safe for you  Always read the medicine label and follow directions  · Acetaminophen  decreases pain and fever  It is available without a doctor's order   Ask how much to take and how often to take it  Follow directions  Read the labels of all other medicines you are using to see if they also contain acetaminophen, or ask your doctor or pharmacist  Acetaminophen can cause liver damage if not taken correctly  Do not use more than 4 grams (4,000 milligrams) total of acetaminophen in one day  · Take your medicine as directed  Contact your healthcare provider if you think your medicine is not helping or if you have side effects  Tell him or her if you are allergic to any medicine  Keep a list of the medicines, vitamins, and herbs you take  Include the amounts, and when and why you take them  Bring the list or the pill bottles to follow-up visits  Carry your medicine list with you in case of an emergency  Bathing: You can shower in 24 hours  Remove your bandage before you shower  It is normal to see a small amount of blood under the bandage  Carefully wash around your wound  It is okay to let soap and water run over your wound  Do not  scrub your wound  Gently pay your wound dry  Care for your wound as directed: If you have strips of medical tape over your incision, allow them to fall off on their own  It may take 7 to 10 days for them to fall off  Do not put powders, lotions, or creams on your wound  They may cause your wound to get infected  Do not get in a bathtub, swimming pool, or hot tub until your healthcare provider says it is okay  Check your wound every day for signs of infection, such as redness, swelling, or pus  Bruising is normal and expected  Men may have bruising and swelling in the scrotum  Take deep breaths and cough:  Do this 10 times each hour  This will decrease your risk for a lung infection  Take a deep breath and hold it for as long as you can  Let the air out and then cough strongly  Deep breaths help open your airway  You may be given an incentive spirometer to help you take deep breaths  Put the plastic piece in your mouth and take a slow, deep breath  Then let the air out and cough  Repeat these steps 10 times every hour  Use a pillow as a splint:  Press a pillow lightly against your incision when you cough, move, or get out of bed  This may decrease pain or discomfort  You may need another person to help you get in and out of bed, a chair, or off the toilet  Activity:  Get out of bed and walk the day after your surgery  This will help prevent blood clots, move your bowels after surgery, and increase healing  Start with short walks around the house  Gradually walk further each day  Do not play sports for 2 to 3 weeks  Do not lift anything heavier than 10 pounds until your healthcare provider says it is okay  This may put too much pressure on your incision and cause it to come apart  It may also increase your risk for another hernia  Drink liquids as directed:  Liquids may prevent constipation and straining during a bowel movement  This will help prevent pressure on your incision, and prevent another hernia  Ask how much liquid to drink each day and which liquids are best for you  Decrease swelling:   · Apply ice  on your incision for 15 to 20 minutes every hour for the 48 hours  Use an ice pack, or put crushed ice in a plastic bag  Cover it with a towel  Ice helps prevent tissue damage and decreases swelling and pain  · Elevate your scrotum  on a towel  Lie in bed  Roll a small towel and place it under your scrotum  This will help decrease swelling and bruising  ***    Driving:  Do not drive for at least 1 week after surgery  Do not drive if you are taking prescription pain medication  Do not drive until it is comfortable to wear a seatbelt across your abdomen  Ask your healthcare provider when it is safe for you to drive  Return to work or school: You may be able to return to work or school in 50 to 67 hours  You may need to stay out of work if longer you have to lift heavy items at work       Do not smoke:  Nicotine and other chemicals in cigarettes and cigars can prevent your wound from healing  It can also increase your risk for another inguinal hernia  Ask your healthcare provider for information if you currently smoke and need help to quit  E-cigarettes or smokeless tobacco still contain nicotine  Talk to your healthcare provider before you use these products  Follow up with your healthcare provider as directed in 2 - 3 weeks:  Write down your questions so you remember to ask them during your visits  © Copyright [employer] 2022 Information is for End User's use only and may not be sold, redistributed or otherwise used for commercial purposes  All illustrations and images included in CareNotes® are the copyrighted property of A D A M , Inc  or [address] [place], [place] [address]dam[address]   The above information is an  only  It is not intended as medical advice for individual conditions or treatments  Talk to your doctor, nurse or pharmacist before following any medical regimen to see if it is safe and effective for you

## 2022-04-25 ENCOUNTER — TELEPHONE (OUTPATIENT)
Dept: SURGERY | Facility: CLINIC | Age: 72
End: 2022-04-25

## 2022-04-25 NOTE — TELEPHONE ENCOUNTER
Please advise him to use dulcolax 10 mg once and start miralax 17g bid  Both are over the counter  Try to avoid straining a lot  Thanks

## 2022-04-25 NOTE — TELEPHONE ENCOUNTER
Pt called stating that he has not had a bm yet since surgery, he has been taking colace 2 times a day pt complains of abdominal pain, bloating like pain, no nausea or vomiting no fever but has been having night sweats, swollen incisions but no drainage  At this time pt states he does not :think: he has pain but soreness from surgery       Pt is concerned

## 2022-05-02 ENCOUNTER — OFFICE VISIT (OUTPATIENT)
Dept: FAMILY MEDICINE CLINIC | Facility: CLINIC | Age: 72
End: 2022-05-02
Payer: MEDICARE

## 2022-05-02 VITALS
OXYGEN SATURATION: 97 % | RESPIRATION RATE: 18 BRPM | BODY MASS INDEX: 35.41 KG/M2 | HEIGHT: 72 IN | HEART RATE: 57 BPM | WEIGHT: 261.4 LBS | TEMPERATURE: 97.8 F | DIASTOLIC BLOOD PRESSURE: 58 MMHG | SYSTOLIC BLOOD PRESSURE: 110 MMHG

## 2022-05-02 DIAGNOSIS — I10 ESSENTIAL HYPERTENSION: ICD-10-CM

## 2022-05-02 DIAGNOSIS — N43.2 OTHER HYDROCELE: Primary | ICD-10-CM

## 2022-05-02 DIAGNOSIS — E78.2 MIXED HYPERLIPIDEMIA: ICD-10-CM

## 2022-05-02 DIAGNOSIS — J41.0 SIMPLE CHRONIC BRONCHITIS (HCC): ICD-10-CM

## 2022-05-02 PROCEDURE — 99214 OFFICE O/P EST MOD 30 MIN: CPT | Performed by: FAMILY MEDICINE

## 2022-05-02 NOTE — ASSESSMENT & PLAN NOTE
Well controlled, continue simvastatin, he is getting his blood work done routinely by the [facility] Statement Selected

## 2022-05-02 NOTE — PROGRESS NOTES
Assessment/Plan:         Problem List Items Addressed This Visit        Respiratory    Chronic obstructive pulmonary disease ([facility] )       Well controlled, he is no longer smoking  Continue to monitor  Cardiovascular and Mediastinum    Essential hypertension       Controlled, continue the Accupril            Genitourinary    Hydrocele - Primary       Follow-up with surgery on Wednesday for his routine postop visit  Other    Mixed hyperlipidemia       Well controlled, continue simvastatin, he is getting his blood work done routinely by the [place]                 Subjective:      Patient ID: Una Hernandez is a 67 y o  male  Patient comes in today for checkup, since his last visit he did have left groin 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  He does complain of some swelling in the area and in his testicle  He denies any pain in the area  The following portions of the patient's history were reviewed and updated as appropriate:   Past Medical History:  He has a past medical history of [facility] ), A[facility] aneurysm ([facility] ), Cardiac disease, COPD (chronic obstructive pulmonary disease) ([facility] ), Coronary artery disease, DVT (deep venous thrombosis) ([facility] ), Fungal dermatitis, GERD (gastroesophageal reflux disease), Hyperlipidemia, Hypertension, MRSA (methicillin resistant Staphylococcus aureus) infection, Post traumatic stress disorder (PTSD), and Psychiatric disorder  ,  _______________________________________________________________________  Medical Problems:  does not have any pertinent problems on file ,  _______________________________________________________________________  Past Surgical History:   has a past surgical history that includes Total knee arthroplasty (Bilateral, 2014); Total hip arthroplasty (Bilateral, 2009, 2010); Hernia repair (N/A, unknown, supraumbilical); CHOLECYSTECTOMY LAPAROSCOPIC (N/A, 3/7/2016);  Cholecystectomy; Colonoscopy; pr colonoscopy flx dx dyllan/jen spec when pfrmd (N/A, 1/9/2018); Cataract extraction; Joint replacement (Bilateral); and Hernia repair (Left, 4/21/2022)  ,  _______________________________________________________________________  Family History:  family history includes Aortic aneurysm in his father; Coronary artery disease in his father, paternal grandfather, and paternal grandmother; Diabetes in his mother; Heart disease in his father ,  _______________________________________________________________________  Social History:   reports that he quit smoking about 16 years ago  His smoking use included cigarettes  He started smoking about 58 years ago  He has a 80 00 pack-year smoking history  He has quit using smokeless tobacco  He reports that he does not drink alcohol and does not use drugs  ,  _______________________________________________________________________  Allergies:  is allergic to morphine and related     _______________________________________________________________________  Current Outpatient Medications   Medication Sig Dispense Refill    alfuzosin (UROXATRAL) 10 mg 24 hr tablet Take 1 tablet (10 mg total) by mouth daily (Patient taking differently: Take 10 mg by mouth daily after dinner  ) 90 tablet 1    docusate sodium (COLACE) 100 mg capsule Take 1 capsule (100 mg total) by mouth 2 (two) times a day 10 capsule 0    famotidine (PEPCID) 40 MG tablet Take 1 tablet (40 mg total) by mouth daily before dinner 90 tablet 3    Multiple Vitamin (multivitamin) capsule Take 1 capsule by mouth daily      quinapril (ACCUPRIL) 10 mg tablet TAKE 1 TABLET BY MOUTH EVERY DAY (Patient taking differently: Take 10 mg by mouth every morning  ) 90 tablet 0    RESTASIS 0 05 % ophthalmic emulsion INSTILL 1 DROP INTO BOTH EYES TWICE A DAY  3    simvastatin (ZOCOR) 80 mg tablet Take 80 mg by mouth daily at bedtime        traZODone (DESYREL) 50 mg tablet Take 50 mg by mouth daily at bedtime       No current facility-administered medications for this visit      _______________________________________________________________________  Review of Systems   Constitutional: Negative for chills, fatigue and fever  HENT: Negative for congestion, ear pain, hearing loss, postnasal drip, rhinorrhea and sore throat  Eyes: Negative for pain and visual disturbance  Respiratory: Negative for chest tightness, shortness of breath and wheezing  Cardiovascular: Negative for chest pain and leg swelling  Gastrointestinal: Negative for abdominal distention, abdominal pain, constipation, diarrhea and vomiting  Endocrine: Negative for cold intolerance and heat intolerance  Genitourinary: Positive for scrotal swelling  Negative for difficulty urinating, frequency and urgency  Musculoskeletal: Negative for arthralgias and gait problem  Skin: Negative for color change  Neurological: Negative for dizziness, tremors, syncope, numbness and headaches  Hematological: Negative for adenopathy  Psychiatric/Behavioral: Negative for agitation, confusion and sleep disturbance  The patient is not nervous/anxious  Objective:  Vitals:    05/02/22 0905   BP: 110/58   Pulse: 57   Resp: 18   Temp: 97 8 °F (36 6 °C)   SpO2: 97%   Weight: 119 kg (261 lb 6 4 oz)   Height: 6' (1 829 m)     Body mass index is 35 45 kg/m²  Physical Exam  Vitals and nursing note reviewed  Constitutional:       Appearance: He is well-developed  HENT:      Head: Normocephalic  Eyes:      General: No scleral icterus  Conjunctiva/sclera: Conjunctivae normal    Neck:      Thyroid: No thyromegaly  Cardiovascular:      Rate and Rhythm: Normal rate and regular rhythm  Heart sounds: Normal heart sounds  No murmur heard  Pulmonary:      Effort: Pulmonary effort is normal  No respiratory distress  Breath sounds: Normal breath sounds  No wheezing  Abdominal:      General: Bowel sounds are normal  There is no distension  Palpations: Abdomen is soft  Tenderness: There is no abdominal tenderness  Genitourinary:     Comments: Swelling of the left inguinal region and the left scrotum, no tenderness  Incision is clean and dry, no dehiscence  Musculoskeletal:         General: No tenderness  Normal range of motion  Cervical back: Normal range of motion  Lymphadenopathy:      Cervical: No cervical adenopathy  Skin:     General: Skin is warm and dry  Coloration: Skin is not pale  Findings: No rash  Neurological:      Mental Status: He is alert and oriented to person, place, and time  Cranial Nerves: No cranial nerve deficit     Psychiatric:         Behavior: Behavior normal

## 2022-05-04 ENCOUNTER — OFFICE VISIT (OUTPATIENT)
Dept: SURGERY | Facility: CLINIC | Age: 72
End: 2022-05-04

## 2022-05-04 VITALS
DIASTOLIC BLOOD PRESSURE: 70 MMHG | TEMPERATURE: 98 F | WEIGHT: 262 LBS | HEIGHT: 72 IN | SYSTOLIC BLOOD PRESSURE: 122 MMHG | HEART RATE: 73 BPM | OXYGEN SATURATION: 95 % | BODY MASS INDEX: 35.49 KG/M2 | RESPIRATION RATE: 16 BRPM

## 2022-05-04 DIAGNOSIS — Z09 POSTOP CHECK: Primary | ICD-10-CM

## 2022-05-04 PROCEDURE — 99024 POSTOP FOLLOW-UP VISIT: CPT | Performed by: SURGERY

## 2022-05-04 NOTE — PROGRESS NOTES
Assessment/Plan:    Pathology Results:  Final Diagnosis   A  Left cord lipoma (excision):  - Benign mature fibroadipose tissue consistent with lipoma        1  Postop check  -     US scrotum and groin area; Future; Expected date: 05/04/2022      ultrasound to evaluate the groin and scrotal swelling  Possible drainable fluid collection  Subjective:      Patient ID: Elise Omalley is a 67 y o  male  Triage Notes:    Mr Anoop Vasquez is following up after groin exploration and excision of a large cord lipoma  He has a lot of swelling in the groin and scrotum  It is not painful  He is emptying bladder/bowels  The following portions of the patient's history were reviewed and updated as appropriate: allergies, current medications, past family history, past medical history, past social history, past surgical history and problem list     Review of Systems      Objective:      /70   Pulse 73   Temp 98 °F (36 7 °C) (Temporal)   Resp 16   Ht 6' (1 829 m)   Wt 119 kg (262 lb)   SpO2 95%   BMI 35 53 kg/m²     Below is the patient's most recent value for Albumin, ALT, AST, BUN, Calcium, Chloride, Cholesterol, CO2, Creatinine, GFR, Glucose, HDL, Hematocrit, Hemoglobin, Hemoglobin A1C, LDL, Magnesium, Phosphorus, Platelets, Potassium, PSA, Sodium, Triglycerides, and WBC  Lab Results   Component Value Date    ALT 27 03/03/2021    AST 16 03/03/2021    BUN 16 04/08/2022    CALCIUM 9 0 04/08/2022     04/08/2022    CHOL 152 03/21/2017    CO2 28 04/08/2022    CREATININE 1 09 04/08/2022    HDL 43 03/03/2021    HCT 46 0 04/08/2022    HGB 14 8 04/08/2022    HGBA1C 5 6 03/03/2021     04/08/2022    K 4 1 04/08/2022    PSA 0 7 03/03/2021     03/21/2017    TRIG 113 03/03/2021    WBC 5 55 04/08/2022     Note: for a comprehensive list of the patient's lab results, access the Results Review activity  Physical Exam  Vitals and nursing note reviewed     Constitutional:       General: He is not in acute distress  Appearance: He is well-developed  HENT:      Head: Normocephalic and atraumatic  Eyes:      General:         Right eye: No discharge  Left eye: No discharge  Neck:      Vascular: No JVD  Cardiovascular:      Rate and Rhythm: Normal rate  Pulmonary:      Effort: Pulmonary effort is normal    Abdominal:      General: There is no distension  Palpations: Abdomen is soft  Tenderness: There is no abdominal tenderness  There is no guarding  Genitourinary:     Comments: There is swelling and induration of the groin and hemiscrotum  There is no warmth/redness/drainage  Musculoskeletal:         General: Normal range of motion  Cervical back: Normal range of motion and neck supple  Skin:     General: Skin is warm and dry  Neurological:      Mental Status: He is alert and oriented to person, place, and time  Psychiatric:         Behavior: Behavior normal          Thought Content:  Thought content normal              Procedures

## 2022-05-06 ENCOUNTER — TELEPHONE (OUTPATIENT)
Dept: SURGERY | Facility: CLINIC | Age: 72
End: 2022-05-06

## 2022-05-06 NOTE — TELEPHONE ENCOUNTER
Called pt  Left detailed msg of Dr Katilynn Garner response  I also scheudled him to see Marjan next week, per [facility]  For wed 5/11 at 4:30  I did not get to speak to patient to let him know of this appt day & time  Wanted to schd before not available  I left my direct number for him to call back for appt

## 2022-05-06 NOTE — TELEPHONE ENCOUNTER
Pt in on 5/4 saw erick post op    When he was in doc said   "if red, call for antibiotic "     [facility] [place]    "Still having night sweats, low grade fever too"      Call pt with any questions or concerns  (anna are not in today)

## 2022-05-08 ENCOUNTER — HOSPITAL ENCOUNTER (OUTPATIENT)
Dept: ULTRASOUND IMAGING | Facility: HOSPITAL | Age: 72
Discharge: HOME | End: 2022-05-08
Attending: SURGERY
Payer: MEDICARE

## 2022-05-08 DIAGNOSIS — Z98.890 S/P INGUINAL HERNIA REPAIR: ICD-10-CM

## 2022-05-08 DIAGNOSIS — Z87.19 S/P INGUINAL HERNIA REPAIR: ICD-10-CM

## 2022-05-08 PROCEDURE — 76870 US EXAM SCROTUM: CPT

## 2022-05-09 ENCOUNTER — TELEPHONE (OUTPATIENT)
Dept: OTHER | Facility: OTHER | Age: 72
End: 2022-05-09

## 2022-05-09 NOTE — TELEPHONE ENCOUNTER
S/w Dr Timothy Hawkins in Radiology needed to speak to on call for Gen Surgery in regards to post op scan for the patient

## 2022-05-11 ENCOUNTER — OFFICE VISIT (OUTPATIENT)
Dept: SURGERY | Facility: CLINIC | Age: 72
End: 2022-05-11

## 2022-05-11 VITALS
BODY MASS INDEX: 35.21 KG/M2 | HEART RATE: 73 BPM | DIASTOLIC BLOOD PRESSURE: 72 MMHG | RESPIRATION RATE: 16 BRPM | HEIGHT: 72 IN | SYSTOLIC BLOOD PRESSURE: 124 MMHG | WEIGHT: 260 LBS | TEMPERATURE: 98 F | OXYGEN SATURATION: 95 %

## 2022-05-11 DIAGNOSIS — L76.34 POSTOPERATIVE SEROMA OF SUBCUTANEOUS TISSUE AFTER NON-DERMATOLOGIC PROCEDURE: ICD-10-CM

## 2022-05-11 DIAGNOSIS — Z09 POSTOP CHECK: Primary | ICD-10-CM

## 2022-05-11 PROCEDURE — 99024 POSTOP FOLLOW-UP VISIT: CPT | Performed by: SURGERY

## 2022-05-19 DIAGNOSIS — I10 ESSENTIAL HYPERTENSION: ICD-10-CM

## 2022-05-19 RX ORDER — QUINAPRIL 10 MG/1
10 TABLET ORAL DAILY
Qty: 90 TABLET | Refills: 0 | Status: SHIPPED | OUTPATIENT
Start: 2022-05-19

## 2022-05-24 ENCOUNTER — OFFICE VISIT (OUTPATIENT)
Dept: SURGERY | Facility: CLINIC | Age: 72
End: 2022-05-24

## 2022-05-24 VITALS
SYSTOLIC BLOOD PRESSURE: 120 MMHG | TEMPERATURE: 98 F | WEIGHT: 260 LBS | OXYGEN SATURATION: 97 % | RESPIRATION RATE: 16 BRPM | HEIGHT: 72 IN | BODY MASS INDEX: 35.21 KG/M2 | DIASTOLIC BLOOD PRESSURE: 76 MMHG | HEART RATE: 72 BPM

## 2022-05-24 DIAGNOSIS — L76.34 POSTOPERATIVE SEROMA OF SUBCUTANEOUS TISSUE AFTER NON-DERMATOLOGIC PROCEDURE: Primary | ICD-10-CM

## 2022-05-24 DIAGNOSIS — Z09 POSTOP CHECK: ICD-10-CM

## 2022-05-24 PROCEDURE — 99024 POSTOP FOLLOW-UP VISIT: CPT | Performed by: SURGERY

## 2022-06-01 NOTE — PROGRESS NOTES
Assessment/Plan:    Ultrasound Results:  IMPRESSION:     Evidence of inguinal hernia of fat or potentially lipoma of the right spermatic cord not seen on the prior ultrasound      Large complex collection in the left scrotal region measuring up to 8 1 cm could represent hematoma although abscess is not excluded in the proper clinical context      Bilateral testicular flow is noted at this time      Follow-up is advised   1  Postop check    2  Postoperative seroma of subcutaneous tissue after non-dermatologic procedure      I expect this seroma vs hematoma will continue to improve with time  He is not currently experiencing overt signs of infection such as fever/chills/blanching erythema/purulent drainage and thus would prefer to avoid instrumentation at this time  Will see him back in 2 weeks to observe progress  Subjective:      Patient ID: Gabby Hale is a 67 y o  male  Triage Notes:    Mr Reymundo Headley is following up after groin exploration and excision of a large cord lipoma  He has a lot of swelling in the groin and scrotum - he feels that it is mostly unchanged  It is not painful  He is emptying bladder/bowels  The following portions of the patient's history were reviewed and updated as appropriate: allergies, current medications, past family history, past medical history, past social history, past surgical history and problem list     Review of Systems      Objective:      /72   Pulse 73   Temp 98 °F (36 7 °C) (Temporal)   Resp 16   Ht 6' (1 829 m)   Wt 118 kg (260 lb)   SpO2 95%   BMI 35 26 kg/m²     Below is the patient's most recent value for Albumin, ALT, AST, BUN, Calcium, Chloride, Cholesterol, CO2, Creatinine, GFR, Glucose, HDL, Hematocrit, Hemoglobin, Hemoglobin A1C, LDL, Magnesium, Phosphorus, Platelets, Potassium, PSA, Sodium, Triglycerides, and WBC     Lab Results   Component Value Date    ALT 27 03/03/2021    AST 16 03/03/2021    BUN 16 04/08/2022    CALCIUM 9 0 04/08/2022     04/08/2022    CHOL 152 03/21/2017    CO2 28 04/08/2022    CREATININE 1 09 04/08/2022    HDL 43 03/03/2021    HCT 46 0 04/08/2022    HGB 14 8 04/08/2022    HGBA1C 5 6 03/03/2021     04/08/2022    K 4 1 04/08/2022    PSA 0 7 03/03/2021     03/21/2017    TRIG 113 03/03/2021    WBC 5 55 04/08/2022     Note: for a comprehensive list of the patient's lab results, access the Results Review activity  Physical Exam  Vitals and nursing note reviewed  Constitutional:       General: He is not in acute distress  Appearance: He is well-developed  HENT:      Head: Normocephalic and atraumatic  Eyes:      General:         Right eye: No discharge  Left eye: No discharge  Neck:      Vascular: No JVD  Cardiovascular:      Rate and Rhythm: Normal rate  Pulmonary:      Effort: Pulmonary effort is normal    Abdominal:      General: There is no distension  Palpations: Abdomen is soft  Tenderness: There is no abdominal tenderness  There is no guarding  Genitourinary:     Comments: There is swelling and induration of the groin and hemiscrotum - slight incremental improvement  There is no warmth/redness/drainage or echymosis  Musculoskeletal:         General: Normal range of motion  Cervical back: Normal range of motion and neck supple  Skin:     General: Skin is warm and dry  Neurological:      Mental Status: He is alert and oriented to person, place, and time  Psychiatric:         Behavior: Behavior normal          Thought Content:  Thought content normal              Procedures

## 2022-06-05 NOTE — PROGRESS NOTES
Assessment/Plan:     1  Postoperative seroma of subcutaneous tissue after non-dermatologic procedure    2  Postop check      As he remains asymptomatic he is fine to do activity as tolerated  No issue with travel  He can visit on his return  I anticipate the swelling will continue to slowly improve  Subjective:      Patient ID: Relda Litten is a 67 y o  male  Triage Notes:    Mr Amy Patel is following up after groin exploration and excision of a large cord lipoma  He has a lot of swelling in the groin and scrotum - he feels that the groin/incisional swelling has improved and the groin swelling is not much different  It is not painful  He is emptying bladder/bowels  He has a trip coming up  The following portions of the patient's history were reviewed and updated as appropriate: allergies, current medications, past family history, past medical history, past social history, past surgical history and problem list     Review of Systems      Objective:      /76   Pulse 72   Temp 98 °F (36 7 °C) (Temporal)   Resp 16   Ht 6' (1 829 m)   Wt 118 kg (260 lb)   SpO2 97%   BMI 35 26 kg/m²     Below is the patient's most recent value for Albumin, ALT, AST, BUN, Calcium, Chloride, Cholesterol, CO2, Creatinine, GFR, Glucose, HDL, Hematocrit, Hemoglobin, Hemoglobin A1C, LDL, Magnesium, Phosphorus, Platelets, Potassium, PSA, Sodium, Triglycerides, and WBC  Lab Results   Component Value Date    ALT 27 03/03/2021    AST 16 03/03/2021    BUN 16 04/08/2022    CALCIUM 9 0 04/08/2022     04/08/2022    CHOL 152 03/21/2017    CO2 28 04/08/2022    CREATININE 1 09 04/08/2022    HDL 43 03/03/2021    HCT 46 0 04/08/2022    HGB 14 8 04/08/2022    HGBA1C 5 6 03/03/2021     04/08/2022    K 4 1 04/08/2022    PSA 0 7 03/03/2021     03/21/2017    TRIG 113 03/03/2021    WBC 5 55 04/08/2022     Note: for a comprehensive list of the patient's lab results, access the Results Review activity  Physical Exam  Vitals and nursing note reviewed  Constitutional:       General: He is not in acute distress  Appearance: He is well-developed  HENT:      Head: Normocephalic and atraumatic  Eyes:      General:         Right eye: No discharge  Left eye: No discharge  Neck:      Vascular: No JVD  Cardiovascular:      Rate and Rhythm: Normal rate  Pulmonary:      Effort: Pulmonary effort is normal    Abdominal:      General: There is no distension  Palpations: Abdomen is soft  Tenderness: There is no abdominal tenderness  There is no guarding  Genitourinary:     Comments: There is swelling and induration of the groin and hemiscrotum - the groin has improved more than the scrotal swelling  There is no warmth/redness/drainage or echymosis  Musculoskeletal:         General: Normal range of motion  Cervical back: Normal range of motion and neck supple  Skin:     General: Skin is warm and dry  Neurological:      Mental Status: He is alert and oriented to person, place, and time  Psychiatric:         Mood and Affect: Mood normal          Behavior: Behavior normal          Thought Content:  Thought content normal          Judgment: Judgment normal              Procedures

## 2022-07-19 ENCOUNTER — OFFICE VISIT (OUTPATIENT)
Dept: SURGERY | Facility: CLINIC | Age: 72
End: 2022-07-19

## 2022-07-19 VITALS
SYSTOLIC BLOOD PRESSURE: 132 MMHG | RESPIRATION RATE: 16 BRPM | HEART RATE: 60 BPM | BODY MASS INDEX: 35.76 KG/M2 | OXYGEN SATURATION: 98 % | HEIGHT: 72 IN | DIASTOLIC BLOOD PRESSURE: 60 MMHG | TEMPERATURE: 98 F | WEIGHT: 264 LBS

## 2022-07-19 DIAGNOSIS — L76.34 POSTOPERATIVE SEROMA OF SUBCUTANEOUS TISSUE AFTER NON-DERMATOLOGIC PROCEDURE: Primary | ICD-10-CM

## 2022-07-19 PROCEDURE — 99024 POSTOP FOLLOW-UP VISIT: CPT | Performed by: SURGERY

## 2022-07-19 NOTE — PROGRESS NOTES
7/20/2022      Chief Complaint   Patient presents with    Benign Prostatic Hypertrophy     Assessment and Plan    1  Left groin swelling   - S/p left groin exploration and excision of cord lipoma on 4/21/22   - Denies any pain and reports reduction in swelling    2  BPH with LUTS  - Improved with Alfuzosin    - If doing well, can f/u in 1 year for symptom reassessment  If any worsening urinary symptoms, would recommend further evaluation with cystoscopy and TRUS  History of Present Illness  Margot Mcbride is a 67 y o  male here for follow up evaluation of  left groin swelling and lower urinary tract symptoms  Patient was seen in March of this year with left scrotal swelling  An ultrasound was obtained showing a moderate-sized fat containing left inguinal hernia as well as small bilateral hydroceles  He was referred to general surgery  Intraoperative findings were consistent with a cord lipoma which was excised  He reports improvement in pain and swelling  He has chronic lower urinary tract symptoms  Previously had failed Flomax and oxybutynin in the past   He has urinary urgency, frequency, nocturia, and weak urinary stream   He was trialed on alfuzosin at last visit  He wishes to avoid surgical management  He reports the alfuzosin has been somewhat beneficial   Currently denies any significant urinary bother  Review of Systems   Constitutional: Negative for chills and fever  Respiratory: Negative for shortness of breath  Cardiovascular: Negative for chest pain  Gastrointestinal: Negative for abdominal pain  Genitourinary: Negative for difficulty urinating, dysuria, flank pain, frequency, hematuria, scrotal swelling, testicular pain and urgency  Neurological: Negative for dizziness                    Past Medical History  Past Medical History:   Diagnosis Date    [facility] pectoris [facility]     [facility] ([facility] )     under observation    Cardiac disease     COPD [facility]chronic obstructive [facility] disease) ([facility]     Coronary artery disease     DVT (deep venous thrombosis) ([facility] )         Fungal dermatitis     GERD (gastroesophageal reflux disease)     Hyperlipidemia     Hypertension     per [employer]: Essential hypertension ith goal blood pressure less than 130/85; Resolved:17    MRSA (methicillin resistant Staphylococcus aureus) infection     Post traumatic stress disorder (PTSD)     Psychiatric disorder        Past Social History  Past Surgical History:   Procedure Laterality Date    CATARACT EXTRACTION      CHOLECYSTECTOMY      CHOLECYSTECTOMY LAPAROSCOPIC N/A 3/7/2016    Procedure: CHOLECYSTECTOMY LAPAROSCOPIC;  Surgeon: Miya Edwards DO;  [facility];  Service:     COLONOSCOPY      HERNIA REPAIR N/A unknown, supraumbilical    HERNIA REPAIR Left 2022    Procedure: EXCISION OF CORD LIPOMA , GROIN EXPLORATION;  Surgeon: Soy Johnson MD;  Location: [facility];  Service: General    JOINT REPLACEMENT Bilateral     hips and knees    [place] COLONOSCOPY FLX DX W/COLLJ SPEC WHEN PFRMD N/A 2018    Procedure: COLONOSCOPY;  Surgeon: Tess Daniel MD;  Location: [facility];   Service: Gastroenterology    TOTAL HIP ARTHROPLASTY Bilateral ,     TOTAL KNEE ARTHROPLASTY Bilateral      Social History     Tobacco Use   Smoking Status Former Smoker    Packs/day: 2 00    Years: 40 00    Pack years: 80 00    Types: Cigarettes    Start date:     Quit date:     Years since quittin 5   Smokeless Tobacco Former User       Past Family History  Family History   Problem Relation Age of Onset    Diabetes Mother     Heart disease Father     Coronary artery disease Father     Aortic aneurysm Father         Ruptured abdominal aortic aneurysm    Coronary artery disease Paternal Grandmother     Coronary artery disease Paternal Grandfather        Past Social history  Social History     Socioeconomic History    Marital status: /Civil Union     Spouse name: Not on file    Number of children: Not on file    Years of education: Not on file    Highest education level: Not on file   Occupational History    Occupation: Retired   Tobacco Use    Smoking status: Former Smoker     Packs/day: 2 00     Years: 40 00     Pack years: 80 00     Types: Cigarettes     Start date:      Quit date:      Years since quittin 5    Smokeless tobacco: Former User   Vaping Use    Vaping Use: Never used   Substance and Sexual Activity    Alcohol use: No    Drug use: No    Sexual activity: Not on file   Other Topics Concern    Not on file   Social History Narrative    Daily caffeine consumption     Social Determinants of Health     Financial Resource Strain: Not on file   Food Insecurity: Not on file   Transportation Needs: Not on file   Physical Activity: Not on file   Stress: Not on file   Social Connections: Not on file   Intimate Partner Violence: Not on file   Housing Stability: Not on file       Current Medications  Current Outpatient Medications   Medication Sig Dispense Refill    alfuzosin (UROXATRAL) 10 mg 24 hr tablet Take 1 tablet (10 mg total) by mouth daily (Patient taking differently: Take 10 mg by mouth daily after dinner) 90 tablet 1    docusate sodium (COLACE) 100 mg capsule Take 1 capsule (100 mg total) by mouth 2 (two) times a day 10 capsule 0    famotidine (PEPCID) 40 MG tablet Take 1 tablet (40 mg total) by mouth daily before dinner 90 tablet 3    Multiple Vitamin (multivitamin) capsule Take 1 capsule by mouth daily      quinapril (ACCUPRIL) 10 mg tablet Take 1 tablet (10 mg total) by mouth in the morning  90 tablet 0    RESTASIS 0 05 % ophthalmic emulsion INSTILL 1 DROP INTO BOTH EYES TWICE A DAY  3    simvastatin (ZOCOR) 80 mg tablet Take 80 mg by mouth daily at bedtime   traZODone (DESYREL) 50 mg tablet Take 50 mg by mouth daily at bedtime       No current facility-administered medications for this visit  Allergies  Allergies   Allergen Reactions    Morphine And Related GI Intolerance         The following portions of the patient's history were reviewed and updated as appropriate: allergies, current medications, past medical history, past social history, past surgical history and problem list       Vitals  Vitals:    07/20/22 0903   BP: 134/82   Pulse: 69   SpO2: 97%   Weight: 120 kg (264 lb)   Height: 6' (1 829 m)           Physical Exam  Physical Exam  Constitutional:       Appearance: Normal appearance  HENT:      Head: Normocephalic and atraumatic  Right Ear: External ear normal       Left Ear: External ear normal    Eyes:      General: No scleral icterus  Conjunctiva/sclera: Conjunctivae normal    Cardiovascular:      Pulses: Normal pulses  Pulmonary:      Effort: Pulmonary effort is normal    Musculoskeletal:         General: Normal range of motion  Cervical back: Normal range of motion  Neurological:      General: No focal deficit present  Mental Status: He is alert and oriented to person, place, and time  Psychiatric:         Mood and Affect: Mood normal          Behavior: Behavior normal          Thought Content: Thought content normal          Judgment: Judgment normal            Results  No results found for this or any previous visit (from the past 1 hour(s)) ]  Lab Results   Component Value Date    PSA 0 7 03/03/2021    PSA 1 1 10/10/2019    PSA 0 5 04/11/2018     Lab Results   Component Value Date    CALCIUM 9 0 04/08/2022     03/21/2017    K 4 1 04/08/2022    CO2 28 04/08/2022     04/08/2022    BUN 16 04/08/2022    CREATININE 1 09 04/08/2022     Lab Results   Component Value Date    WBC 5 55 04/08/2022    HGB 14 8 04/08/2022    HCT 46 0 04/08/2022    MCV 97 04/08/2022     04/08/2022           Orders  No orders of the defined types were placed in this encounter        Aj Perkins PA-C

## 2022-07-20 ENCOUNTER — OFFICE VISIT (OUTPATIENT)
Dept: UROLOGY | Facility: CLINIC | Age: 72
End: 2022-07-20
Payer: MEDICARE

## 2022-07-20 VITALS
BODY MASS INDEX: 35.76 KG/M2 | SYSTOLIC BLOOD PRESSURE: 134 MMHG | OXYGEN SATURATION: 97 % | DIASTOLIC BLOOD PRESSURE: 82 MMHG | HEIGHT: 72 IN | HEART RATE: 69 BPM | WEIGHT: 264 LBS

## 2022-07-20 DIAGNOSIS — N13.8 BPH WITH OBSTRUCTION/LOWER URINARY TRACT SYMPTOMS: Primary | ICD-10-CM

## 2022-07-20 DIAGNOSIS — N40.1 BPH WITH OBSTRUCTION/LOWER URINARY TRACT SYMPTOMS: Primary | ICD-10-CM

## 2022-07-20 PROCEDURE — 99213 OFFICE O/P EST LOW 20 MIN: CPT | Performed by: PHYSICIAN ASSISTANT

## 2022-07-25 ENCOUNTER — HOSPITAL ENCOUNTER (OUTPATIENT)
Dept: CT IMAGING | Facility: HOSPITAL | Age: 72
Discharge: HOME | End: 2022-07-25
Attending: INTERNAL MEDICINE
Payer: MEDICARE

## 2022-07-25 DIAGNOSIS — Z87.891 FORMER SMOKER: ICD-10-CM

## 2022-07-25 PROCEDURE — 71271 CT THORAX LUNG CANCER SCR C-: CPT

## 2022-08-08 DIAGNOSIS — N52.01 ERECTILE DYSFUNCTION DUE TO ARTERIAL INSUFFICIENCY: Primary | ICD-10-CM

## 2022-08-09 RX ORDER — SILDENAFIL CITRATE 20 MG/1
60 TABLET ORAL DAILY PRN
Qty: 30 TABLET | Refills: 1 | Status: SHIPPED | OUTPATIENT
Start: 2022-08-09

## 2022-08-09 NOTE — TELEPHONE ENCOUNTER
From: Lilian Dickson  To: Office of Nolan Pabon, [address]  Sent: 8/8/2022 5:49 PM EDT  Subject: Medication Renewal Request    Refills have been requested for the following medications:    Other - Sildenafil    Preferred pharmacy: [id number], [address] [place] [address]

## 2022-08-11 ENCOUNTER — OFFICE VISIT (OUTPATIENT)
Dept: PULMONOLOGY | Facility: CLINIC | Age: 72
End: 2022-08-11
Payer: MEDICARE

## 2022-08-11 VITALS
BODY MASS INDEX: 35.76 KG/M2 | HEART RATE: 76 BPM | TEMPERATURE: 97.7 F | DIASTOLIC BLOOD PRESSURE: 80 MMHG | WEIGHT: 264 LBS | OXYGEN SATURATION: 91 % | SYSTOLIC BLOOD PRESSURE: 124 MMHG | HEIGHT: 72 IN

## 2022-08-11 DIAGNOSIS — Z87.891 FORMER SMOKER: ICD-10-CM

## 2022-08-11 DIAGNOSIS — F17.211 CIGARETTE NICOTINE DEPENDENCE IN REMISSION: ICD-10-CM

## 2022-08-11 DIAGNOSIS — E66.9 OBESITY (BMI 30-39.9): ICD-10-CM

## 2022-08-11 DIAGNOSIS — J41.0 SIMPLE CHRONIC BRONCHITIS (HCC): ICD-10-CM

## 2022-08-11 DIAGNOSIS — R06.02 SOB (SHORTNESS OF BREATH): Primary | ICD-10-CM

## 2022-08-11 PROCEDURE — 99214 OFFICE O/P EST MOD 30 MIN: CPT | Performed by: INTERNAL MEDICINE

## 2022-08-11 NOTE — PROGRESS NOTES
Assessment/Plan:   Diagnoses and all orders for this visit:    SOB (shortness of breath)    Simple chronic bronchitis (HCC)    Cigarette nicotine dependence in remission  -     CT lung screening program; Future    Obesity (BMI 30-39  9)    Former smoker      shortness of breath and dyspnea on exertion in this 41-year-old gentleman with greater than 30 pack-year smoking history who quit about 14 years ago likely multifactorial secondary to chronic bronchitis/COPD with likely asthma overlap with his allergies and also likely secondary to his obesity  PFTs with broncho stood bronchodilator lung volumes and DLCO with mild restrictive pattern and mildly decreased DLCO  FEV1 was 77% post bronchodilator response was not perform   CT of the chest from July of 2022 with stable micro atelectasis and scarring in the lingular otherwise normal images and report were reviewed with the patient and the patient's wife who has accompanied this office visit   Is a candidate for lung cancer screening annually ordered for next year   Continue with albuterol MDI 2 puffs 4 times daily as needed states he is not needing it 8 for for a few months now  Follow-up with cardiology as scheduled   Vaccinations up-to-date  I will see him back in 1 year or p r n  earlier as needed  Return in about 1 year (around 8/11/2023)  All questions are answered to the patient's satisfaction and understanding  He verbalizes understanding  He is encouraged to call with any further questions or concerns  Portions of the record may have been created with voice recognition software  Occasional wrong word or "sound a like" substitutions may have occurred due to the inherent limitations of voice recognition software  Read the chart carefully and recognize, using context, where substitutions have occurred      Electronically Signed by Faiza Hale MD    ______________________________________________________________________    Chief Complaint:   Chief Complaint   Patient presents with    Follow-up       Patient ID: Batsheva Gaitan is a 67 y o  y o  male has a past medical history of Angina pectoris ([facility] [address] ), Aortic aneurysm ([facility] [address] ), Cardiac disease, COPD (chronic obstructive pulmonary disease) ([facility] [address] ), Coronary artery disease, DVT (deep venous thrombosis) ([facility] [address] ), Fungal dermatitis, GERD (gastroesophageal reflux disease), Hyperlipidemia, Hypertension, MRSA (methicillin resistant Staphylococcus aureus) infection, Post traumatic stress disorder (PTSD), and Psychiatric disorder  8/11/2022  Patient presents today for follow-up visit  Patient is a very pleasant 67-year-old gentleman with greater than 30 pack-year smoker history, who quit about 14 years ago he states, in December of 2019 he developed chest tightness shortness of breath and wheezing and he states since then on and off has been having some episodes of cough and wheezing   Here for follow-up with CT lung screening      Associated symptoms include a sore throat  Pertinent negatives include no chest pain, fever, headaches or myalgias  Review of Systems   Constitutional: Negative  Negative for appetite change and fever  HENT: Positive for sore throat  Negative for ear pain, postnasal drip, rhinorrhea, sneezing and trouble swallowing  Eyes: Negative  Respiratory: Positive for shortness of breath  Shortness of breath and dyspnea on exertion at baseline no worsening   Cardiovascular: Negative  Negative for chest pain  Gastrointestinal: Negative  Endocrine: Negative  Genitourinary: Negative  Musculoskeletal: Negative  Negative for myalgias  Allergic/Immunologic: Negative  Neurological: Negative  Negative for headaches  Hematological: Negative  Psychiatric/Behavioral: Negative  Smoking history: He reports that he quit smoking about 16 years ago  His smoking use included cigarettes  He started smoking about 58 years ago  He has a 80 00 pack-year smoking history   He has quit using smokeless tobacco     The following portions of the patient's history were reviewed and updated as appropriate: allergies, current medications, past family history, past medical history, past social history, past surgical history and problem list     Immunization History   Administered Date(s) Administered    COVID-19 PFIZER VACCINE 0 3 ML IM 02/01/2021, 02/23/2021, 10/20/2021    INFLUENZA 01/08/2004, 01/01/2005, 10/24/2005, 12/06/2006, 10/29/2007, 10/21/2008, 09/17/2009, 10/05/2010, 11/14/2011, 10/25/2012, 10/23/2013, 03/27/2014, 10/01/2018, 10/15/2018, 10/01/2021    Influenza Quadrivalent Preservative Free 3 years and older IM 10/04/2016    Influenza Split High Dose Preservative Free IM 10/04/2017    Influenza, high dose seasonal 0 7 mL 09/16/2019, 10/23/2020    Pneumococcal 10/19/2004    Pneumococcal Conjugate 13-Valent 08/26/2016, 10/25/2019    Pneumococcal Polysaccharide PPV23 10/17/2018    Td (adult), Unspecified 08/05/2014     Current Outpatient Medications   Medication Sig Dispense Refill    alfuzosin (UROXATRAL) 10 mg 24 hr tablet Take 1 tablet (10 mg total) by mouth daily (Patient taking differently: Take 10 mg by mouth daily after dinner) 90 tablet 1    docusate sodium (COLACE) 100 mg capsule Take 1 capsule (100 mg total) by mouth 2 (two) times a day 10 capsule 0    famotidine (PEPCID) 40 MG tablet Take 1 tablet (40 mg total) by mouth daily before dinner 90 tablet 3    Multiple Vitamin (multivitamin) capsule Take 1 capsule by mouth daily      quinapril (ACCUPRIL) 10 mg tablet Take 1 tablet (10 mg total) by mouth in the morning  90 tablet 0    RESTASIS 0 05 % ophthalmic emulsion INSTILL 1 DROP INTO BOTH EYES TWICE A DAY  3    sildenafil (REVATIO) 20 mg tablet Take 3 tablets (60 mg total) by mouth daily as needed (erectile dysfunction) 30 tablet 1    simvastatin (ZOCOR) 80 mg tablet Take 80 mg by mouth daily at bedtime        traZODone (DESYREL) 50 mg tablet Take 50 mg by mouth daily at bedtime       No current facility-administered medications for this visit  Allergies: Morphine and related    Objective:  Vitals:    08/11/22 0917   BP: 124/80   Pulse: 76   Temp: 97 7 °F (36 5 °C)   SpO2: 91%   Weight: 120 kg (264 lb)   Height: 6' (1 829 m)   Oxygen Therapy  SpO2: 91 %    Wt Readings from Last 3 Encounters:   08/11/22 120 kg (264 lb)   07/20/22 120 kg (264 lb)   07/19/22 120 kg (264 lb)     Body mass index is 35 8 kg/m²  Physical Exam  Vitals and nursing note reviewed  Constitutional:       Appearance: He is well-developed  HENT:      Head: Normocephalic and atraumatic  Eyes:      Conjunctiva/sclera: Conjunctivae normal       Pupils: Pupils are equal, round, and reactive to light  Neck:      Thyroid: No thyromegaly  Vascular: No JVD  Cardiovascular:      Rate and Rhythm: Normal rate and regular rhythm  Heart sounds: Normal heart sounds  No murmur heard  No friction rub  No gallop  Pulmonary:      Effort: Pulmonary effort is normal  No respiratory distress  Breath sounds: Normal breath sounds  No wheezing or rales  Chest:      Chest wall: No tenderness  Musculoskeletal:         General: No tenderness or deformity  Normal range of motion  Cervical back: Normal range of motion and neck supple  Lymphadenopathy:      Cervical: No cervical adenopathy  Skin:     General: Skin is warm and dry  Neurological:      Mental Status: He is alert and oriented to person, place, and time       Answers for HPI/ROS submitted by the patient on 8/8/2022  When did you first notice your symptoms?: more than 1 year ago  How often do your symptoms occur?: rarely  Since you first noticed this problem, how has it changed?: unchanged  Do you have shortness of breath that occurs with effort or exertion?: Yes  Do you have ear congestion?: No  Do you have heartburn?: No  Do you have fatigue?: No  Do you have nasal congestion?: No  Do you have shortness of breath when lying flat?: No  Do you have shortness of breath when you wake up?: No  Do you have sweats?: No  Have you experienced weight loss?: No  Which of the following makes your symptoms worse?: nothing  Which of the following makes your symptoms better?: nothing  Risk factors for lung disease: animal exposure, smoking/tobacco exposure, travel        Diagnostics:  I have personally reviewed pertinent films in PACS  CT lung screening program    Result Date: 7/29/2022  Narrative: CT CHEST LUNG CANCER SCREENING WITHOUT IV CONTRAST INDICATION:   [id number]: Personal history of nicotine dependence  COMPARISON: CT chest 6/29/2021  TECHNIQUE:  Unenhanced CT examination of the chest was performed utilizing a low dose protocol  Reformatted images were created in axial, sagittal, and coronal planes  Radiation dose length product (DLP) for this visit:  336 mGy-cm   This examination, like all CT scans performed in the [facility], was performed utilizing techniques to minimize radiation dose exposure, including the use of iterative reconstruction and automated exposure control  FINDINGS: LARGE AIRWAYS: Large airways are clear with no tracheal or endobronchial lesion  LUNGS:  Subsegmental linear atelectasis/scarring in the lingula  No consolidation or edema  Tiny calcified granuloma in the left upper lobe  PLEURA:  No pleural effusion or pneumothorax  HEART: Normal cardiac size and morphology  Minimal coronary artery calcification  No pericardial effusion or thickening  VESSELS: No significant change in minimal aneurysmal dilatation of the ascending aorta now measuring 42 mm (previously 41 mm)  Mildly dilated proximally artery measuring 32 mm, unchanged  MEDIASTINUM AND ZACHERY:  Within normal limits  CHEST WALL AND LOWER NECK:   Mild bilateral gynecomastia  VISUALIZED STRUCTURES IN THE UPPER ABDOMEN:  Atrophic left hepatic lobe  Cholecystectomy  A gastric fundal diverticulum measures 6 4 x 4 7 cm   OSSEOUS STRUCTURES:  No acute fracture or destructive osseous lesion  Moderate multilevel spondylosis  Vertebral body height and alignment is maintained  Impression: 1  No pulmonary nodules  2   No significant change in a 42 mm ascending aortic aneurysm  Lung-RADS1, negative  Continued annual screening with LDCT in 12 months   Workstation performed: [id number]

## 2022-08-23 NOTE — PROGRESS NOTES
Assessment/Plan:    Pathology Results:  Final Diagnosis   A  Left cord lipoma (excision):  - Benign mature fibroadipose tissue consistent with lipoma      1  Postoperative seroma of subcutaneous tissue after non-dermatologic procedure      He is doing great  No need for additional followup as he has demonstrated marked improvement and has no symptoms  Subjective:      Patient ID: Andrea Choi is a 67 y o  male  Triage Notes:    Mr Brigitte Astorga is following up after groin exploration and excision of a large cord lipoma close to 4 months ago  He initially had a lot of swelling in the groin and scrotum - he feels that the groin/incisional swelling has continued to improve  It is not painful  He is emptying bladder/bowels  He enjoyed his trip and did not have limitations  The following portions of the patient's history were reviewed and updated as appropriate: allergies, current medications, past family history, past medical history, past social history, past surgical history and problem list     Review of Systems   Constitutional: Negative for appetite change, chills, fever and unexpected weight change  HENT: Negative for hearing loss, sore throat, trouble swallowing and voice change  Eyes: Negative for visual disturbance  Respiratory: Negative for cough, chest tightness, shortness of breath and wheezing  Cardiovascular: Negative for chest pain and palpitations  Gastrointestinal: Negative for abdominal distention and blood in stool  Endocrine: Negative for cold intolerance and heat intolerance  Genitourinary: Negative for difficulty urinating  Musculoskeletal: Negative for gait problem  Skin: Negative for color change and rash  Neurological: Negative for dizziness, speech difficulty, light-headedness and numbness  Hematological: Does not bruise/bleed easily  Psychiatric/Behavioral: Negative for confusion, hallucinations and suicidal ideas           Objective:      /60 Pulse 60   Temp 98 °F (36 7 °C) (Temporal)   Resp 16   Ht 6' (1 829 m)   Wt 120 kg (264 lb)   SpO2 98%   BMI 35 80 kg/m²     Below is the patient's most recent value for Albumin, ALT, AST, BUN, Calcium, Chloride, Cholesterol, CO2, Creatinine, GFR, Glucose, HDL, Hematocrit, Hemoglobin, Hemoglobin A1C, LDL, Magnesium, Phosphorus, Platelets, Potassium, PSA, Sodium, Triglycerides, and WBC  Lab Results   Component Value Date    ALT 27 03/03/2021    AST 16 03/03/2021    BUN 16 04/08/2022    CALCIUM 9 0 04/08/2022     04/08/2022    CHOL 152 03/21/2017    CO2 28 04/08/2022    CREATININE 1 09 04/08/2022    HDL 43 03/03/2021    HCT 46 0 04/08/2022    HGB 14 8 04/08/2022    HGBA1C 5 6 03/03/2021     04/08/2022    K 4 1 04/08/2022    PSA 0 7 03/03/2021     03/21/2017    TRIG 113 03/03/2021    WBC 5 55 04/08/2022     Note: for a comprehensive list of the patient's lab results, access the Results Review activity  Physical Exam  Vitals and nursing note reviewed  Constitutional:       General: He is not in acute distress  Appearance: He is well-developed  HENT:      Head: Normocephalic and atraumatic  Eyes:      General:         Right eye: No discharge  Left eye: No discharge  Neck:      Vascular: No JVD  Cardiovascular:      Rate and Rhythm: Normal rate  Pulmonary:      Effort: Pulmonary effort is normal    Abdominal:      General: There is no distension  Palpations: Abdomen is soft  Tenderness: There is no abdominal tenderness  There is no guarding  Genitourinary:     Comments: There is swelling and induration of the groin and hemiscrotum that has almost completely resolved  There is no warmth/redness/drainage or echymosis  Musculoskeletal:         General: Normal range of motion  Cervical back: Normal range of motion and neck supple  Skin:     General: Skin is warm and dry     Neurological:      Mental Status: He is alert and oriented to person, place, and time  Psychiatric:         Mood and Affect: Mood normal          Behavior: Behavior normal          Thought Content:  Thought content normal          Judgment: Judgment normal              Procedures

## 2022-08-31 ENCOUNTER — OFFICE VISIT (OUTPATIENT)
Dept: CARDIOLOGY CLINIC | Facility: CLINIC | Age: 72
End: 2022-08-31
Payer: MEDICARE

## 2022-08-31 VITALS
RESPIRATION RATE: 18 BRPM | HEIGHT: 72 IN | DIASTOLIC BLOOD PRESSURE: 74 MMHG | BODY MASS INDEX: 35.76 KG/M2 | WEIGHT: 264 LBS | OXYGEN SATURATION: 98 % | HEART RATE: 66 BPM | SYSTOLIC BLOOD PRESSURE: 120 MMHG

## 2022-08-31 DIAGNOSIS — I71.9 AORTIC ANEURYSM WITHOUT RUPTURE, UNSPECIFIED PORTION OF AORTA (HCC): ICD-10-CM

## 2022-08-31 DIAGNOSIS — I10 ESSENTIAL HYPERTENSION: Primary | ICD-10-CM

## 2022-08-31 PROCEDURE — 99214 OFFICE O/P EST MOD 30 MIN: CPT | Performed by: INTERNAL MEDICINE

## 2022-08-31 RX ORDER — SIMVASTATIN 40 MG
40 TABLET ORAL
Qty: 90 TABLET | Refills: 3 | Status: SHIPPED | OUTPATIENT
Start: 2022-08-31

## 2022-08-31 NOTE — PROGRESS NOTES
Cardiology Follow Up    Tamiko Rice  1950  [id number]  [facility]  [facility] [address] [place] [address] [phone]    Discussion/Summary:  1  Hypertension  2  Dyslipidemia  3  Mild aortic aneurysm with significant family history    Recent non-contrast lung screenign CT showed slight increase in Aortic aneurysm from 4 1 to 4 2  Patient is concerned  Will check a CT chest abd and pelvis (dedicated study) to evaluate for aneurysm  States father  from an abdominal aneurysm in his 46s  Blood pressure is well controlled  Continue current medications      Continue with statin therapy    Previous studies were reviewed  Safety measures were reviewed  Questions were entertained and answered  Patient was advised to report any problems requiring medical attention  Follow-up with PCP and appropriate specialist and lab work as discussed  Return for follow up visit as scheduled or earlier, if needed  Thank you for allowing me to participate in the care and evaluation of your patient  Should you have any questions, please feel free to contact me  History of Present Illness:     Tamiko Rice is a 67y o  year old man with past medical history of hypertension and dyslipidemia who presents for follow up for cardiovascular care  Denies chest pain, chest pressure, shortness of breath, dizziness, lightheadedness, presyncope or syncope  Denies orthopnea, PND or lower limb edema  Remains very active  Actively golfing  Plays 18 holes with no issues,     Abdominal Ultrasound 3/2021:  No evidence for abdominal aortic aneurysm        Patient Active Problem List   Diagnosis    Post traumatic stress disorder    Mixed hyperlipidemia    Essential hypertension    Elevated blood sugar    Diarrhea    Multiple allergies    Seborrheic keratosis    Tension headache    Cervicalgia    Stenosis of left carotid artery    Hydrocele    BPH with obstruction/lower urinary tract symptoms    Hematospermia    Scrotal swelling    SOB (shortness of breath)    Gastroesophageal reflux disease without esophagitis    BMI 35 0-35 9,adult    Erectile dysfunction due to arterial insufficiency    Obesity, morbid ([place] [address] )    Chronic obstructive pulmonary disease (HCC)     Past Medical History:   Diagnosis Date    Angina pectoris ([place] [address] )     Aortic aneurysm (HCC)     under observation    Cardiac disease     COPD (chronic obstructive pulmonary disease) ([place] [address] )     Coronary artery disease     DVT (deep venous thrombosis) ([place] [address] )         Fungal dermatitis     GERD (gastroesophageal reflux disease)     Hyperlipidemia     Hypertension     per [employer]: Essential hypertension ith goal blood pressure less than 130/85;  Resolved:17    MRSA (methicillin resistant Staphylococcus aureus) infection     Post traumatic stress disorder (PTSD)     Psychiatric disorder      Social History     Socioeconomic History    Marital status: /Civil Union     Spouse name: Not on file    Number of children: Not on file    Years of education: Not on file    Highest education level: Not on file   Occupational History    Occupation: Retired   Tobacco Use    Smoking status: Former Smoker     Packs/day: 2 00     Years: 40 00     Pack years: 80 00     Types: Cigarettes     Start date:      Quit date:      Years since quittin 6    Smokeless tobacco: Former User   Vaping Use    Vaping Use: Never used   Substance and Sexual Activity    Alcohol use: No    Drug use: No    Sexual activity: Not on file   Other Topics Concern    Not on file   Social History Narrative    Daily caffeine consumption     Social Determinants of Health     Financial Resource Strain: Not on file   Food Insecurity: Not on file   Transportation Needs: Not on file   Physical Activity: Not on file   Stress: Not on file   Social Connections: Not on file   Intimate Partner Violence: Not on file   Housing Stability: Not on file      Family History   Problem Relation Age of Onset    Diabetes Mother     Heart disease Father     Coronary artery disease Father     Aortic aneurysm Father         Ruptured abdominal aortic aneurysm    Coronary artery disease Paternal Grandmother     Coronary artery disease Paternal Grandfather      Past Surgical History:   Procedure Laterality Date    CATARACT EXTRACTION      CHOLECYSTECTOMY      CHOLECYSTECTOMY LAPAROSCOPIC N/A 3/7/2016    Procedure: CHOLECYSTECTOMY LAPAROSCOPIC;  Surgeon: Awilda Holt DO;  [facility];  Service:     COLONOSCOPY      HERNIA REPAIR N/A unknown, supraumbilical    HERNIA REPAIR Left 4/21/2022    Procedure: EXCISION OF CORD LIPOMA , GROIN EXPLORATION;  Surgeon: Juaquin Pete MD;  Location: [facility];  Service: General    JOINT REPLACEMENT Bilateral     hips and knees    MT COLONOSCOPY FLX DX W/COLLJ SPEC WHEN PFRMD N/A 1/9/2018    Procedure: COLONOSCOPY;  Surgeon: Christiane Mendenhall MD;  Location: [facility]; Service: Gastroenterology    TOTAL HIP ARTHROPLASTY Bilateral 2009, 2010    TOTAL KNEE ARTHROPLASTY Bilateral 2014       Current Outpatient Medications:     Multiple Vitamin (multivitamin) capsule, Take 1 capsule by mouth daily, Disp: , Rfl:     quinapril (ACCUPRIL) 10 mg tablet, Take 1 tablet (10 mg total) by mouth in the morning , Disp: 90 tablet, Rfl: 0    RESTASIS 0 05 % ophthalmic emulsion, INSTILL 1 DROP INTO BOTH EYES TWICE A DAY, Disp: , Rfl: 3    sildenafil (REVATIO) 20 mg tablet, Take 3 tablets (60 mg total) by mouth daily as needed (erectile dysfunction), Disp: 30 tablet, Rfl: 1    simvastatin (ZOCOR) 80 mg tablet, Take 80 mg by mouth daily at bedtime  , Disp: , Rfl:     traZODone (DESYREL) 50 mg tablet, Take 50 mg by mouth daily at bedtime, Disp: , Rfl:     alfuzosin (UROXATRAL) 10 mg 24 hr tablet, Take 1 tablet (10 mg total) by mouth daily (Patient not taking: Reported on 8/31/2022), Disp: 90 tablet, Rfl: 1    docusate sodium (COLACE) 100 mg capsule, Take 1 capsule (100 mg total) by mouth 2 (two) times a day (Patient not taking: Reported on 8/31/2022), Disp: 10 capsule, Rfl: 0    famotidine (PEPCID) 40 MG tablet, Take 1 tablet (40 mg total) by mouth daily before dinner (Patient not taking: Reported on 8/31/2022), Disp: 90 tablet, Rfl: 3  Allergies   Allergen Reactions    Morphine And Related GI Intolerance       Labs:  Lab Results   Component Value Date    WBC 5 55 04/08/2022    HGB 14 8 04/08/2022    HCT 46 0 04/08/2022    MCV 97 04/08/2022     04/08/2022     Lab Results   Component Value Date    CALCIUM 9 0 04/08/2022     03/21/2017    K 4 1 04/08/2022    CO2 28 04/08/2022     04/08/2022    BUN 16 04/08/2022    CREATININE 1 09 04/08/2022     Lab Results   Component Value Date    HGBA1C 5 6 03/03/2021     Lab Results   Component Value Date    CHOL 152 03/21/2017     Lab Results   Component Value Date    HDL 43 03/03/2021    HDL 48 01/14/2020    HDL 51 10/10/2019     Lab Results   Component Value Date    LDLCALC 89 03/03/2021    LDLCALC 80 01/14/2020    LDLCALC 77 10/10/2019     Lab Results   Component Value Date    TRIG 113 03/03/2021    TRIG 93 01/14/2020    TRIG 80 10/10/2019     No results found for: CHOLHDL    Review of Systems:  Review of Systems   Constitutional: Negative  Negative for activity change, appetite change, fatigue and fever  Respiratory: Negative for chest tightness and shortness of breath  Cardiovascular: Negative for chest pain, palpitations and leg swelling  Gastrointestinal: Negative for nausea and vomiting  Genitourinary: Negative for difficulty urinating  Musculoskeletal: Negative for back pain  Neurological: Negative for dizziness, syncope, weakness and light-headedness  Hematological: Negative for adenopathy  All other systems reviewed and are negative        Physical Exam:  Physical Exam  Vitals and nursing note reviewed  Constitutional:       General: He is not in acute distress  Appearance: Normal appearance  He is not ill-appearing or diaphoretic  HENT:      Head: Normocephalic and atraumatic  Eyes:      Extraocular Movements: Extraocular movements intact  Cardiovascular:      Rate and Rhythm: Normal rate and regular rhythm  Heart sounds: No murmur heard  No friction rub  No gallop  Pulmonary:      Effort: Pulmonary effort is normal  No respiratory distress  Breath sounds: Normal breath sounds  Abdominal:      General: There is no distension  Palpations: Abdomen is soft  Musculoskeletal:         General: No swelling  Normal range of motion  Skin:     General: Skin is warm and dry  Capillary Refill: Capillary refill takes less than 2 seconds  Coloration: Skin is not pale  Neurological:      General: No focal deficit present  Mental Status: He is alert and oriented to person, place, and time     Psychiatric:         Mood and Affect: Mood normal

## 2022-09-07 ENCOUNTER — TELEPHONE (OUTPATIENT)
Dept: CARDIOLOGY CLINIC | Facility: CLINIC | Age: 72
End: 2022-09-07

## 2022-09-07 DIAGNOSIS — R06.02 SOB (SHORTNESS OF BREATH): Primary | ICD-10-CM

## 2022-09-07 NOTE — TELEPHONE ENCOUNTER
Spoke with pt. Pt verbally understood that the order is in and to please get it done 1-2 weeks prior to test

## 2022-09-07 NOTE — TELEPHONE ENCOUNTER
Patient called and said that he is going for CT scan on 11/14 pt was told that he need blood work prior to appointment. Pt would like an order to have his labs placed in chart.         Please call pt and inform if labs would be placed in chart.  # [phone]

## 2022-09-23 DIAGNOSIS — I10 ESSENTIAL HYPERTENSION: ICD-10-CM

## 2022-09-23 RX ORDER — QUINAPRIL 10 MG/1
10 TABLET ORAL DAILY
Qty: 90 TABLET | Refills: 0 | Status: SHIPPED | OUTPATIENT
Start: 2022-09-23

## 2022-10-02 DIAGNOSIS — N13.8 BPH WITH OBSTRUCTION/LOWER URINARY TRACT SYMPTOMS: ICD-10-CM

## 2022-10-02 DIAGNOSIS — N40.1 BPH WITH OBSTRUCTION/LOWER URINARY TRACT SYMPTOMS: ICD-10-CM

## 2022-10-03 RX ORDER — ALFUZOSIN HYDROCHLORIDE 10 MG/1
TABLET, EXTENDED RELEASE ORAL
Qty: 90 TABLET | Refills: 1 | Status: SHIPPED | OUTPATIENT
Start: 2022-10-03

## 2022-10-10 ENCOUNTER — TELEMEDICINE (OUTPATIENT)
Dept: FAMILY MEDICINE CLINIC | Facility: CLINIC | Age: 72
End: 2022-10-10
Payer: MEDICARE

## 2022-10-10 DIAGNOSIS — U07.1 COVID-19: Primary | ICD-10-CM

## 2022-10-10 PROCEDURE — 99213 OFFICE O/P EST LOW 20 MIN: CPT | Performed by: FAMILY MEDICINE

## 2022-10-10 RX ORDER — NIRMATRELVIR AND RITONAVIR 300-100 MG
3 KIT ORAL 2 TIMES DAILY
Qty: 30 TABLET | Refills: 0 | Status: SHIPPED | OUTPATIENT
Start: 2022-10-10 | End: 2022-10-15

## 2022-10-10 NOTE — PROGRESS NOTES
COVID-19 Outpatient Progress Note    Assessment/Plan:    Problem List Items Addressed This Visit    None     Visit Diagnoses     COVID-19    -  Primary    Relevant Medications    nirmatrelvir & ritonavir (Paxlovid, 300/100,) tablet therapy pack        Disposition:     I have spent 15 minutes directly with the patient  Greater than 50% of this time was spent in counseling/coordination of care regarding: diagnostic results, instructions for management and impressions  Stop simvastatin while on the Paxlovid  Encounter provider: Freddy Soto DO     Provider located at: [facility] [address] [facility] [address] [facility] [facility]  [address] [facility] [address]     Recent Visits  No visits were found meeting these conditions  Showing recent visits within past 7 days and meeting all other requirements  Today's Visits  Date Type Provider Dept   10/10/22 Telemedicine Freddy Soto DO [facility] 8 today's visits and meeting all other requirements  Future Appointments  No visits were found meeting these conditions  Showing future appointments within next 150 days and meeting all other requirements     This virtual check-in was done via [employer] [address] and patient was informed that this is a secure, HIPAA-compliant platform  He agrees to proceed  Patient agrees to participate in a virtual check in via telephone or video visit instead of presenting to the office to address urgent/immediate medical needs  Patient is aware this is a billable service  He acknowledged consent and understanding of privacy and security of the video platform  The patient has agreed to participate and understands they can discontinue the visit at any time  After connecting through [facility], the patient was identified by name and date of birth   Lara Gresham was informed that this was a telemedicine visit and that the exam was being conducted confidentially over secure lines  My office door was closed  No one else was in the room  Natalie Wilson acknowledged consent and understanding of privacy and security of the telemedicine visit  I informed the patient that I have reviewed his record in Epic and presented the opportunity for him to ask any questions regarding the visit today  The patient agreed to participate  Verification of patient location:  Patient is located in the following state in which I hold an active license: [place]    Subjective:   Natalie Wilson is a 67 y o  male who is concerned about COVID-19  Patient's symptoms include fever, rhinorrhea, sore throat and cough  Patient denies shortness of breath  - Date of symptom onset: 10/6/2022      COVID-19 vaccination status: Fully vaccinated with booster    Exposure:   Contact with a person who is under investigation (PUI) for or who is positive for COVID-19 within the last 14 days?: Yes    Lab Results   Component Value Date    SARSCOV2 Negative 09/02/2021    SARSCOV2 Not Detected 10/27/2020    [address] [facility] Not Detected 02/09/2022       Review of Systems   Constitutional: Positive for fever  HENT: Positive for rhinorrhea and sore throat  Respiratory: Positive for cough  Negative for shortness of breath        Current Outpatient Medications on File Prior to Visit   Medication Sig   • alfuzosin (UROXATRAL) 10 mg 24 hr tablet TAKE 1 TABLET BY MOUTH EVERY DAY   • docusate sodium (COLACE) 100 mg capsule Take 1 capsule (100 mg total) by mouth 2 (two) times a day (Patient not taking: Reported on 8/31/2022)   • famotidine (PEPCID) 40 MG tablet Take 1 tablet (40 mg total) by mouth daily before dinner (Patient not taking: Reported on 8/31/2022)   • Multiple Vitamin (multivitamin) capsule Take 1 capsule by mouth daily   • quinapril (ACCUPRIL) 10 mg tablet Take 1 tablet (10 mg total) by mouth daily   • RESTASIS 0 05 % ophthalmic emulsion INSTILL 1 DROP INTO BOTH EYES TWICE A DAY   • sildenafil (REVATIO) 20 mg tablet Take 3 tablets (60 mg total) by mouth daily as needed (erectile dysfunction)   • simvastatin (ZOCOR) 40 mg tablet Take 1 tablet (40 mg total) by mouth daily at bedtime   • traZODone (DESYREL) 50 mg tablet Take 50 mg by mouth daily at bedtime       Objective: There were no vitals taken for this visit  Physical Exam  Constitutional:       Appearance: He is well-developed  HENT:      Head: Normocephalic  Pulmonary:      Effort: Pulmonary effort is normal    Neurological:      Mental Status: He is alert and oriented to person, place, and time  Psychiatric:         Behavior: Behavior normal          Thought Content:  Thought content normal          Judgment: Judgment normal        Erickson Morrow,

## 2022-10-26 ENCOUNTER — APPOINTMENT (OUTPATIENT)
Dept: LAB | Facility: CLINIC | Age: 72
End: 2022-10-26

## 2022-10-26 DIAGNOSIS — R06.02 SOB (SHORTNESS OF BREATH): ICD-10-CM

## 2022-10-26 LAB
ANION GAP SERPL CALCULATED.3IONS-SCNC: 6 MMOL/L (ref 4–13)
BUN SERPL-MCNC: 14 MG/DL (ref 5–25)
CALCIUM SERPL-MCNC: 9 MG/DL (ref 8.3–10.1)
CHLORIDE SERPL-SCNC: 109 MMOL/L (ref 96–108)
CO2 SERPL-SCNC: 26 MMOL/L (ref 21–32)
CREAT SERPL-MCNC: 1.1 MG/DL (ref 0.6–1.3)
GFR SERPL CREATININE-BSD FRML MDRD: 66 ML/MIN/1.73SQ M
GLUCOSE P FAST SERPL-MCNC: 113 MG/DL (ref 65–99)
POTASSIUM SERPL-SCNC: 3.9 MMOL/L (ref 3.5–5.3)
SODIUM SERPL-SCNC: 141 MMOL/L (ref 135–147)

## 2022-11-07 ENCOUNTER — TELEPHONE (OUTPATIENT)
Dept: CARDIOLOGY CLINIC | Facility: CLINIC | Age: 72
End: 2022-11-07

## 2022-11-07 NOTE — TELEPHONE ENCOUNTER
Patient Thom Oquendo called the office today with a couple of questions regarding some upcoming testing he is having done   His first question being "Does the blood work I had done show that my kidney's can handle the dye needed for the contrast"  Second question being "Will Dr Cheung check out my echo results after the test on the 14th"    Please give patient a call back  [phone]

## 2022-11-07 NOTE — TELEPHONE ENCOUNTER
Called patient to advise him per Monique that kidney labs are good to proceed with test   As well as Dr Everardo Martinez reading the echo when it completed      Patient confirmed and understood

## 2022-11-07 NOTE — TELEPHONE ENCOUNTER
Pls call  1- kidneys are good to have the test  2- yes dr Demetra Martinez will review echo when its complete

## 2022-11-14 ENCOUNTER — HOSPITAL ENCOUNTER (OUTPATIENT)
Dept: CT IMAGING | Facility: HOSPITAL | Age: 72
Discharge: HOME | End: 2022-11-14
Attending: INTERNAL MEDICINE

## 2022-11-14 DIAGNOSIS — I71.9 AORTIC ANEURYSM WITHOUT RUPTURE, UNSPECIFIED PORTION OF AORTA (HCC): ICD-10-CM

## 2022-11-14 RX ADMIN — IOHEXOL 100 ML: 350 INJECTION, SOLUTION INTRAVENOUS at 15:56

## 2022-12-12 ENCOUNTER — TELEPHONE (OUTPATIENT)
Dept: GASTROENTEROLOGY | Facility: CLINIC | Age: 72
End: 2022-12-12

## 2022-12-13 DIAGNOSIS — I10 ESSENTIAL HYPERTENSION: ICD-10-CM

## 2022-12-13 RX ORDER — QUINAPRIL 10 MG/1
10 TABLET ORAL DAILY
Qty: 90 TABLET | Refills: 0 | Status: SHIPPED | OUTPATIENT
Start: 2022-12-13

## 2022-12-22 ENCOUNTER — RA CDI HCC (OUTPATIENT)
Dept: OTHER | Facility: HOSPITAL | Age: 72
End: 2022-12-22

## 2022-12-22 NOTE — PROGRESS NOTES
[address]  coding opportunities       Chart reviewed, no opportunity found: CHART REVIEWED, NO OPPORTUNITY FOUND        Patients Insurance     Medicare Insurance: Medicare

## 2022-12-30 ENCOUNTER — OFFICE VISIT (OUTPATIENT)
Dept: FAMILY MEDICINE CLINIC | Facility: CLINIC | Age: 72
End: 2022-12-30

## 2022-12-30 VITALS
HEART RATE: 72 BPM | WEIGHT: 267 LBS | HEIGHT: 72 IN | BODY MASS INDEX: 36.16 KG/M2 | TEMPERATURE: 96.6 F | OXYGEN SATURATION: 96 % | SYSTOLIC BLOOD PRESSURE: 108 MMHG | DIASTOLIC BLOOD PRESSURE: 74 MMHG

## 2022-12-30 DIAGNOSIS — Z00.00 HEALTH CARE MAINTENANCE: Primary | ICD-10-CM

## 2022-12-30 DIAGNOSIS — Z12.5 SCREENING PSA (PROSTATE SPECIFIC ANTIGEN): ICD-10-CM

## 2022-12-30 DIAGNOSIS — N52.01 ERECTILE DYSFUNCTION DUE TO ARTERIAL INSUFFICIENCY: ICD-10-CM

## 2022-12-30 DIAGNOSIS — E78.2 MIXED HYPERLIPIDEMIA: ICD-10-CM

## 2022-12-30 DIAGNOSIS — I71.21 ANEURYSM OF ASCENDING AORTA WITHOUT RUPTURE: ICD-10-CM

## 2022-12-30 DIAGNOSIS — I10 ESSENTIAL HYPERTENSION: ICD-10-CM

## 2022-12-30 PROBLEM — N50.89 SCROTAL SWELLING: Status: RESOLVED | Noted: 2020-05-29 | Resolved: 2022-12-30

## 2022-12-30 PROBLEM — R19.7 DIARRHEA: Status: RESOLVED | Noted: 2019-01-09 | Resolved: 2022-12-30

## 2022-12-30 PROBLEM — N43.3 HYDROCELE: Status: RESOLVED | Noted: 2020-01-30 | Resolved: 2022-12-30

## 2022-12-30 RX ORDER — TADALAFIL 20 MG/1
20 TABLET ORAL DAILY PRN
Qty: 10 TABLET | Refills: 0 | Status: SHIPPED | OUTPATIENT
Start: 2022-12-30

## 2022-12-30 NOTE — PROGRESS NOTES
Assessment and Plan:     Problem List Items Addressed This Visit        Cardiovascular and Mediastinum    Essential hypertension     Controlled, continue quinapril         Relevant Orders    CBC    Erectile dysfunction due to arterial insufficiency     Switch to Cialis per his wishes  Relevant Medications    tadalafil (CIALIS) 20 MG tablet    Aneurysm of ascending aorta without rupture     He is up-to-date with his CAT scan, continue to monitor            Other    Mixed hyperlipidemia     Controlled, continue simvastatin, check CMP, lipid profile in 6 months  Relevant Orders    Comprehensive metabolic panel    Lipid panel    BMI 35 0-35 9,adult   Other Visit Diagnoses     Health care maintenance    -  Primary    Screening PSA (prostate specific antigen)        Relevant Orders    PSA, Total Screen        BMI Counseling: Body mass index is 36 21 kg/m²  The BMI is above normal  Nutrition recommendations include decreasing portion sizes and encouraging healthy choices of fruits and vegetables  Exercise recommendations include moderate physical activity 150 minutes/week  No pharmacotherapy was ordered  Rationale for BMI follow-up plan is due to patient being overweight or obese  Depression Screening and Follow-up Plan: Patient was screened for depression during today's encounter  They screened negative with a PHQ-2 score of 2  Preventive health issues were discussed with patient, and age appropriate screening tests were ordered as noted in patient's After Visit Summary  Personalized health advice and appropriate referrals for health education or preventive services given if needed, as noted in patient's After Visit Summary  History of Present Illness:     Patient presents for a Medicare Wellness Visit    Patient was in today for checkup, he did have his CAT scan done for his ascending aortic aneurysm which showed no change  He states he otherwise been doing well       Patient Care Team:  Tessa Pedro DO as PCP - Whitney Smith MD as Endoscopist     Review of Systems:     Review of Systems   Constitutional: Negative for chills, fatigue and fever  HENT: Negative for congestion, ear pain, hearing loss, postnasal drip, rhinorrhea and sore throat  Eyes: Negative for pain and visual disturbance  Respiratory: Negative for chest tightness, shortness of breath and wheezing  Cardiovascular: Negative for chest pain and leg swelling  Gastrointestinal: Negative for abdominal distention, abdominal pain, constipation, diarrhea and vomiting  Endocrine: Negative for cold intolerance and heat intolerance  Genitourinary: Negative for difficulty urinating, frequency and urgency  Musculoskeletal: Negative for arthralgias and gait problem  Skin: Negative for color change  Neurological: Negative for dizziness, tremors, syncope, numbness and headaches  Hematological: Negative for adenopathy  Psychiatric/Behavioral: Negative for agitation, confusion and sleep disturbance  The patient is not nervous/anxious           Problem List:     Patient Active Problem List   Diagnosis   • Post traumatic stress disorder   • Mixed hyperlipidemia   • Essential hypertension   • Elevated blood sugar   • Multiple allergies   • Seborrheic keratosis   • Tension headache   • Cervicalgia   • Stenosis of left carotid artery   • BPH with obstruction/lower urinary tract symptoms   • Hematospermia   • SOB (shortness of breath)   • Gastroesophageal reflux disease without esophagitis   • BMI 35 0-35 9,adult   • Erectile dysfunction due to arterial insufficiency   • Obesity, morbid (HCC)   • Chronic obstructive pulmonary disease (HCC)   • Aneurysm of ascending aorta without rupture      Past Medical and Surgical History:     Past Medical History:   Diagnosis Date   • Angina pectoris ([facility] [address] )    • Aortic aneurysm (HCC)     under observation   • Cardiac disease    • COPD (chronic obstructive pulmonary disease) [facility])    • Coronary artery disease    • DVT (deep venous thrombosis) ([facility] [address] )     2015   • Fungal dermatitis    • GERD (gastroesophageal reflux disease)    • Hyperlipidemia    • Hypertension     per [employer]: Essential hypertension ith goal blood pressure less than 130/85; Resolved:4/20/17   • MRSA (methicillin resistant Staphylococcus aureus) infection    • Post traumatic stress disorder (PTSD)    • Psychiatric disorder      Past Surgical History:   Procedure Laterality Date   • CATARACT EXTRACTION     • CHOLECYSTECTOMY     • CHOLECYSTECTOMY LAPAROSCOPIC N/A 3/7/2016    Procedure: CHOLECYSTECTOMY LAPAROSCOPIC;  Surgeon: Erick Shultz DO;  Location: [facility];  Service:    • COLONOSCOPY     • HERNIA REPAIR N/A unknown, supraumbilical   • HERNIA REPAIR Left 4/21/2022    Procedure: EXCISION OF CORD LIPOMA , GROIN EXPLORATION;  Surgeon: Nathan Grande MD;  Location: [facility];  Service: General   • JOINT REPLACEMENT Bilateral     hips and knees   • NV COLONOSCOPY FLX DX W/COLLJ SPEC WHEN PFRMD N/A 1/9/2018    Procedure: COLONOSCOPY;  Surgeon: Lorri Kwon MD;  Location: [facility];   Service: Gastroenterology   • TOTAL HIP ARTHROPLASTY Bilateral 2009, 2010   • TOTAL KNEE ARTHROPLASTY Bilateral 2014      Family History:     Family History   Problem Relation Age of Onset   • Diabetes Mother    • Heart disease Father    • Coronary artery disease Father    • Aortic aneurysm Father         Ruptured abdominal aortic aneurysm   • Coronary artery disease Paternal Grandmother    • Coronary artery disease Paternal Grandfather       Social History:     Social History     Socioeconomic History   • Marital status: /Civil Union     Spouse name: None   • Number of children: None   • Years of education: None   • Highest education level: None   Occupational History   • Occupation: Retired   Tobacco Use   • Smoking status: Former     Packs/day: 2 00     Years: 40 00     Pack years: 80 00     Types: Cigarettes     Start date:      Quit date: 2006     Years since quittin 0   • Smokeless tobacco: Former   Vaping Use   • Vaping Use: Never used   Substance and Sexual Activity   • Alcohol use: No   • Drug use: No   • Sexual activity: None   Other Topics Concern   • None   Social History Narrative    Daily caffeine consumption     Social Determinants of Health     Financial Resource Strain: Low Risk    • Difficulty of Paying Living Expenses: Not very hard   Food Insecurity: Not on file   Transportation Needs: No Transportation Needs   • Lack of Transportation (Medical): No   • Lack of Transportation (Non-Medical): No   Physical Activity: Not on file   Stress: Not on file   Social Connections: Not on file   Intimate Partner Violence: Not on file   Housing Stability: Not on file      Medications and Allergies:     Current Outpatient Medications   Medication Sig Dispense Refill   • alfuzosin (UROXATRAL) 10 mg 24 hr tablet TAKE 1 TABLET BY MOUTH EVERY DAY 90 tablet 1   • Multiple Vitamin (multivitamin) capsule Take 1 capsule by mouth daily     • quinapril (ACCUPRIL) 10 mg tablet Take 1 tablet (10 mg total) by mouth daily 90 tablet 0   • RESTASIS 0 05 % ophthalmic emulsion INSTILL 1 DROP INTO BOTH EYES TWICE A DAY  3   • simvastatin (ZOCOR) 40 mg tablet Take 1 tablet (40 mg total) by mouth daily at bedtime 90 tablet 3   • tadalafil (CIALIS) 20 MG tablet Take 1 tablet (20 mg total) by mouth daily as needed for erectile dysfunction 10 tablet 0   • traZODone (DESYREL) 50 mg tablet Take 50 mg by mouth daily at bedtime       No current facility-administered medications for this visit       Allergies   Allergen Reactions   • Morphine And Related GI Intolerance      Immunizations:     Immunization History   Administered Date(s) Administered   • COVID-19 PFIZER VACCINE 0 3 ML IM 2021, 2021, 10/20/2021   • INFLUENZA 2004, 2005, 10/24/2005, 2006, 10/29/2007, 10/21/2008, 2009, 10/05/2010, 2011, 10/25/2012, 10/23/2013, 03/27/2014, 10/01/2018, 10/15/2018, 10/01/2021, 10/24/2022   • Influenza Quadrivalent Preservative Free 3 years and older IM 10/04/2016   • Influenza Split High Dose Preservative Free IM 10/04/2017   • Influenza, high dose seasonal 0 7 mL 09/16/2019, 10/23/2020   • Pneumococcal 10/19/2004   • Pneumococcal Conjugate 13-Valent 08/26/2016, 10/25/2019   • Pneumococcal Polysaccharide PPV23 10/17/2018   • Td (adult), Unspecified 08/05/2014      Health Maintenance:         Topic Date Due   • Colorectal Cancer Screening  02/14/2027   • Hepatitis C Screening  Discontinued         Topic Date Due   • Hepatitis A Vaccine (1 of 2 - Risk 2-dose series) Never done   • Hepatitis B Vaccine (1 of 3 - Risk 3-dose series) Never done   • COVID-19 Vaccine (4 - Booster for Pfizer series) 12/15/2021      Medicare Screening Tests and Risk Assessments:     Jackie Salinas is here for his Subsequent Wellness visit  Last Medicare Wellness visit information reviewed, patient interviewed and updates made to the record today  Health Risk Assessment:   Patient rates overall health as good  Patient feels that their physical health rating is same  Patient is very satisfied with their life  Eyesight was rated as same  Hearing was rated as same  Patient feels that their emotional and mental health rating is same  Patients states they are sometimes angry  Patient states they are never, rarely unusually tired/fatigued  Pain experienced in the last 7 days has been none  Patient states that he has experienced no weight loss or gain in last 6 months  Depression Screening:   PHQ-2 Score: 2      Fall Risk Screening: In the past year, patient has experienced: no history of falling in past year      Home Safety:  Patient does not have trouble with stairs inside or outside of their home  Patient has working smoke alarms and has working carbon monoxide detector  Home safety hazards include: none       Nutrition:   Current diet is Regular  Medications:   Patient is currently taking over-the-counter supplements  OTC medications include: see medication list  Patient is able to manage medications  Activities of Daily Living (ADLs)/Instrumental Activities of Daily Living (IADLs):   Walk and transfer into and out of bed and chair?: Yes  Dress and groom yourself?: Yes    Bathe or shower yourself?: Yes    Feed yourself? Yes  Do your laundry/housekeeping?: No  Manage your money, pay your bills and track your expenses?: No  Make your own meals?: No    Do your own shopping?: No    Previous Hospitalizations:   Any hospitalizations or ED visits within the last 12 months?: No      Advance Care Planning:   Living will: No    Durable POA for healthcare: No    Advanced directive: No    Five wishes given: Yes      Cognitive Screening:   Provider or family/friend/caregiver concerned regarding cognition?: No    PREVENTIVE SCREENINGS      Cardiovascular Screening:    General: Screening Not Indicated and History Lipid Disorder      Diabetes Screening:     General: Screening Current      Colorectal Cancer Screening:     General: Screening Current      Prostate Cancer Screening:      Due for: PSA      Osteoporosis Screening:    General: Screening Not Indicated      Abdominal Aortic Aneurysm (AAA) Screening:    Risk factors include: age between 73-67 yo and tobacco use        Lung Cancer Screening:     General: Screening Not Indicated      Hepatitis C Screening:    General: Risks and Benefits Discussed    Screening, Brief Intervention, and Referral to Treatment (SBIRT)    Screening  Typical number of drinks in a day: 0  Typical number of drinks in a week: 0  Interpretation: Low risk drinking behavior      AUDIT-C Screenin) How often did you have a drink containing alcohol in the past year? never  2) How many drinks did you have on a typical day when you were drinking in the past year? 0  3) How often did you have 6 or more drinks on one occasion in the past year? never    AUDIT-C Score: 0  Interpretation: Score 0-3 (male): Negative screen for alcohol misuse    Single Item Drug Screening:  How often have you used an illegal drug (including marijuana) or a prescription medication for non-medical reasons in the past year? never    Single Item Drug Screen Score: 0  Interpretation: Negative screen for possible drug use disorder    Other Counseling Topics:   Car/seat belt/driving safety  No results found  Physical Exam:     /74 (BP Location: Left arm, Patient Position: Sitting, Cuff Size: Large)   Pulse 72   Temp (!) 96 6 °F (35 9 °C)   Ht 6' (1 829 m)   Wt 121 kg (267 lb)   SpO2 96%   BMI 36 21 kg/m²     Physical Exam  Constitutional:       Appearance: He is well-developed  HENT:      Head: Normocephalic  Right Ear: External ear normal       Left Ear: External ear normal       Nose: Nose normal    Eyes:      Conjunctiva/sclera: Conjunctivae normal       Pupils: Pupils are equal, round, and reactive to light  Neck:      Thyroid: No thyromegaly  Cardiovascular:      Rate and Rhythm: Normal rate and regular rhythm  Heart sounds: Normal heart sounds  Pulmonary:      Effort: Pulmonary effort is normal       Breath sounds: Normal breath sounds  Abdominal:      General: Bowel sounds are normal       Palpations: Abdomen is soft  Musculoskeletal:         General: Normal range of motion  Cervical back: Normal range of motion  Skin:     General: Skin is warm and dry  Neurological:      Mental Status: He is alert and oriented to person, place, and time     Psychiatric:         Behavior: Behavior normal           Luvenia Fenton, DO

## 2022-12-30 NOTE — PATIENT INSTRUCTIONS
Medicare Preventive Visit Patient Instructions  Thank you for completing your Welcome to Medicare Visit or Medicare Annual Wellness Visit today  Your next wellness visit will be due in one year (12/31/2023)  The screening/preventive services that you may require over the next 5-10 years are detailed below  Some tests may not apply to you based off risk factors and/or age  Screening tests ordered at today's visit but not completed yet may show as past due  Also, please note that scanned in results may not display below  Preventive Screenings:  Service Recommendations Previous Testing/Comments   Colorectal Cancer Screening  · Colonoscopy    · Fecal Occult Blood Test (FOBT)/Fecal Immunochemical Test (FIT)  · Fecal DNA/Cologuard Test  · Flexible Sigmoidoscopy Age: 39-70 years old   Colonoscopy: every 10 years (May be performed more frequently if at higher risk)  OR  FOBT/FIT: every 1 year  OR  Cologuard: every 3 years  OR  Sigmoidoscopy: every 5 years  Screening may be recommended earlier than age 39 if at higher risk for colorectal cancer  Also, an individualized decision between you and your healthcare provider will decide whether screening between the ages of 74-80 would be appropriate   Colonoscopy: 02/14/2022  FOBT/FIT: Not on file  Cologuard: Not on file  Sigmoidoscopy: Not on file    Screening Current     Prostate Cancer Screening Individualized decision between patient and health care provider in men between ages of 53-78   Medicare will cover every 12 months beginning on the day after your 50th birthday PSA: 0 7 ng/mL           Hepatitis C Screening Once for adults born between 1945 and 1965  More frequently in patients at high risk for Hepatitis C Hep C Antibody: Not on file        Diabetes Screening 1-2 times per year if you're at risk for diabetes or have pre-diabetes Fasting glucose: 113 mg/dL (10/26/2022)  A1C: 5 6 % (3/3/2021)  Screening Current   Cholesterol Screening Once every 5 years if you don't have a lipid disorder  May order more often based on risk factors  Lipid panel: 03/03/2021  Screening Not Indicated  History Lipid Disorder      Other Preventive Screenings Covered by Medicare:  1  Abdominal Aortic Aneurysm (AAA) Screening: covered once if your at risk  You're considered to be at risk if you have a family history of AAA or a male between the age of 73-68 who smoking at least 100 cigarettes in your lifetime  2  Lung Cancer Screening: covers low dose CT scan once per year if you meet all of the following conditions: (1) Age 50-69; (2) No signs or symptoms of lung cancer; (3) Current smoker or have quit smoking within the last 15 years; (4) You have a tobacco smoking history of at least 20 pack years (packs per day x number of years you smoked); (5) You get a written order from a healthcare provider  3  Glaucoma Screening: covered annually if you're considered high risk: (1) You have diabetes OR (2) Family history of glaucoma OR (3)  aged 48 and older OR (3)  American aged 72 and older  3  Osteoporosis Screening: covered every 2 years if you meet one of the following conditions: (1) Have a vertebral abnormality; (2) On glucocorticoid therapy for more than 3 months; (3) Have primary hyperparathyroidism; (4) On osteoporosis medications and need to assess response to drug therapy  5  HIV Screening: covered annually if you're between the age of 12-76  Also covered annually if you are younger than 13 and older than 72 with risk factors for HIV infection  For pregnant patients, it is covered up to 3 times per pregnancy      Immunizations:  Immunization Recommendations   Influenza Vaccine Annual influenza vaccination during flu season is recommended for all persons aged >= 6 months who do not have contraindications   Pneumococcal Vaccine   * Pneumococcal conjugate vaccine = PCV13 (Prevnar 13), PCV15 (Vaxneuvance), PCV20 (Prevnar 20)  * Pneumococcal polysaccharide vaccine = PPSV23 (Pneumovax) Adults 2364 years old: 1-3 doses may be recommended based on certain risk factors  Adults 72 years old: 1-2 doses may be recommended based off what pneumonia vaccine you previously received   Hepatitis B Vaccine 3 dose series if at intermediate or high risk (ex: diabetes, end stage renal disease, liver disease)   Tetanus (Td) Vaccine - COST NOT COVERED BY MEDICARE PART B Following completion of primary series, a booster dose should be given every 10 years to maintain immunity against tetanus  Td may also be given as tetanus wound prophylaxis  Tdap Vaccine - COST NOT COVERED BY MEDICARE PART B Recommended at least once for all adults  For pregnant patients, recommended with each pregnancy  Shingles Vaccine (Shingrix) - COST NOT COVERED BY MEDICARE PART B  2 shot series recommended in those aged 48 and above     Health Maintenance Due:      Topic Date Due   • Colorectal Cancer Screening  02/14/2027   • Hepatitis C Screening  Discontinued     Immunizations Due:      Topic Date Due   • Hepatitis A Vaccine (1 of 2 - Risk 2-dose series) Never done   • Hepatitis B Vaccine (1 of 3 - Risk 3-dose series) Never done   • COVID-19 Vaccine (4 - Booster for Pfizer series) 12/15/2021     Advance Directives   What are advance directives? Advance directives are legal documents that state your wishes and plans for medical care  These plans are made ahead of time in case you lose your ability to make decisions for yourself  Advance directives can apply to any medical decision, such as the treatments you want, and if you want to donate organs  What are the types of advance directives? There are many types of advance directives, and each state has rules about how to use them  You may choose a combination of any of the following:  · Living will: This is a written record of the treatment you want  You can also choose which treatments you do not want, which to limit, and which to stop at a certain time   This includes surgery, medicine, IV fluid, and tube feedings  · Durable power of  for healthcare [facility]): This is a written record that states who you want to make healthcare choices for you when you are unable to make them for yourself  This person, called a proxy, is usually a family member or a friend  You may choose more than 1 proxy  · Do not resuscitate (DNR) order:  A DNR order is used in case your heart stops beating or you stop breathing  It is a request not to have certain forms of treatment, such as CPR  A DNR order may be included in other types of advance directives  · Medical directive: This covers the care that you want if you are in a coma, near death, or unable to make decisions for yourself  You can list the treatments you want for each condition  Treatment may include pain medicine, surgery, blood transfusions, dialysis, IV or tube feedings, and a ventilator (breathing machine)  · Values history: This document has questions about your views, beliefs, and how you feel and think about life  This information can help others choose the care that you would choose  Why are advance directives important? An advance directive helps you control your care  Although spoken wishes may be used, it is better to have your wishes written down  Spoken wishes can be misunderstood, or not followed  Treatments may be given even if you do not want them  An advance directive may make it easier for your family to make difficult choices about your care  Weight Management   Why it is important to manage your weight:  Being overweight increases your risk of health conditions such as heart disease, high blood pressure, type 2 diabetes, and certain types of cancer  It can also increase your risk for osteoarthritis, sleep apnea, and other respiratory problems  Aim for a slow, steady weight loss  Even a small amount of weight loss can lower your risk of health problems    How to lose weight safely:  A safe and healthy way to lose weight is to eat fewer calories and get regular exercise  You can lose up about 1 pound a week by decreasing the number of calories you eat by 500 calories each day  Healthy meal plan for weight management:  A healthy meal plan includes a variety of foods, contains fewer calories, and helps you stay healthy  A healthy meal plan includes the following:  · Eat whole-grain foods more often  A healthy meal plan should contain fiber  Fiber is the part of grains, fruits, and vegetables that is not broken down by your body  Whole-grain foods are healthy and provide extra fiber in your diet  Some examples of whole-grain foods are whole-wheat breads and pastas, oatmeal, brown rice, and bulgur  · Eat a variety of vegetables every day  Include dark, leafy greens such as spinach, kale, sina greens, and mustard greens  Eat yellow and orange vegetables such as carrots, sweet potatoes, and winter squash  · Eat a variety of fruits every day  Choose fresh or canned fruit (canned in its own juice or light syrup) instead of juice  Fruit juice has very little or no fiber  · Eat low-fat dairy foods  Drink fat-free (skim) milk or 1% milk  Eat fat-free yogurt and low-fat cottage cheese  Try low-fat cheeses such as mozzarella and other reduced-fat cheeses  · Choose meat and other protein foods that are low in fat  Choose beans or other legumes such as split peas or lentils  Choose fish, skinless poultry (chicken or turkey), or lean cuts of red meat (beef or pork)  Before you cook meat or poultry, cut off any visible fat  · Use less fat and oil  Try baking foods instead of frying them  Add less fat, such as margarine, sour cream, regular salad dressing and mayonnaise to foods  Eat fewer high-fat foods  Some examples of high-fat foods include french fries, doughnuts, ice cream, and cakes  · Eat fewer sweets  Limit foods and drinks that are high in sugar   This includes candy, cookies, regular soda, and sweetened drinks  Exercise:  Exercise at least 30 minutes per day on most days of the week  Some examples of exercise include walking, biking, dancing, and swimming  You can also fit in more physical activity by taking the stairs instead of the elevator or parking farther away from stores  Ask your healthcare provider about the best exercise plan for you  © Copyright [employer] 2018 Information is for End User's use only and may not be sold, redistributed or otherwise used for commercial purposes   All illustrations and images included in CareNotes® are the copyrighted property of A D A M , Inc  or [address] [place] [address] [employer]

## 2023-02-21 ENCOUNTER — TELEMEDICINE (OUTPATIENT)
Dept: FAMILY MEDICINE CLINIC | Facility: CLINIC | Age: 73
End: 2023-02-21

## 2023-02-21 VITALS — HEIGHT: 72 IN | BODY MASS INDEX: 36.16 KG/M2 | WEIGHT: 267 LBS

## 2023-02-21 DIAGNOSIS — J01.90 ACUTE NON-RECURRENT SINUSITIS, UNSPECIFIED LOCATION: Primary | ICD-10-CM

## 2023-02-21 DIAGNOSIS — J06.9 UPPER RESPIRATORY TRACT INFECTION, UNSPECIFIED TYPE: ICD-10-CM

## 2023-02-21 RX ORDER — AZITHROMYCIN 250 MG/1
TABLET, FILM COATED ORAL
Qty: 6 TABLET | Refills: 0 | Status: SHIPPED | OUTPATIENT
Start: 2023-02-21 | End: 2023-02-25

## 2023-02-21 NOTE — PROGRESS NOTES
COVID-19 Outpatient Progress Note    Assessment/Plan:    Problem List Items Addressed This Visit    None  Visit Diagnoses     Acute non-recurrent sinusitis, unspecified location    -  Primary    Relevant Medications    azithromycin (ZITHROMAX) 250 mg tablet    Upper respiratory tract infection, unspecified type        Relevant Medications    azithromycin (ZITHROMAX) 250 mg tablet         Disposition:     After clarifying the patient's history, my suspicion for COVID-19 infection is very low  I have spent 6 minutes directly with the patient  Encounter provider: Tesfaye Lisa DO     Provider located at: [address]  [address] [facility],[address]  [place] [address] [facility]  [phone]     Recent Visits  No visits were found meeting these conditions  Showing recent visits within past 7 days and meeting all other requirements  Today's Visits  Date Type Provider Dept   02/21/23 Telemedicine Tesfaye Lisa DO [facility] today's visits and meeting all other requirements  Future Appointments  No visits were found meeting these conditions  Showing future appointments within next 150 days and meeting all other requirements       Patient agrees to participate in a virtual check in via telephone or video visit instead of presenting to the office to address urgent/immediate medical needs  Patient is aware this is a billable service  He acknowledged consent and understanding of privacy and security of the video platform  The patient has agreed to participate and understands they can discontinue the visit at any time  After connecting through [facility], the patient was identified by name and date of birth  [facility] was informed that this was a telemedicine visit and that the exam was being conducted confidentially over secure lines  My office door was closed  No one else was in the room   [facility] acknowledged consent and understanding of privacy and security of the telemedicine visit  I informed the patient that I have reviewed his record in Epic and presented the opportunity for him to ask any questions regarding the visit today  The patient agreed to participate  Verification of patient location:  Patient is located in the following state in which I hold an active license: [place]    Subjective:   Ziggy Garcia is a 67 y o  male who is concerned about COVID-19  Patient's symptoms include fatigue, nasal congestion, sore throat, cough and headache (resolved now)  Patient denies fever, chills, malaise, rhinorrhea, anosmia, loss of taste, shortness of breath, chest tightness, abdominal pain, nausea, vomiting, diarrhea and myalgias  - Date of symptom onset: 2/15/2023      COVID-19 vaccination status: Fully vaccinated with booster    Exposure:   Contact with a person who is under investigation (PUI) for or who is positive for COVID-19 within the last 14 days?: No    Hospitalized recently for fever and/or lower respiratory symptoms?: No      Currently a healthcare worker that is involved in direct patient care?: No      Works in a special setting where the risk of COVID-19 transmission may be high? (this may include long-term care, correctional and residential facilities; homeless shelters; assisted-living facilities and group homes ): No      Resident in a special setting where the risk of COVID-19 transmission may be high? (this may include long-term care, correctional and residential facilities; homeless shelters; assisted-living facilities and group homes ): No      COVID test at home yesterday was negative  No known sick contacts  Has been using Mucinex with mild improvement, helped with sleep and cough       Lab Results   Component Value Date    SARSCOV2 Negative 09/02/2021    SARSCOV2 Not Detected 10/27/2020    SARSCORONAVI Not Detected 02/09/2022       Review of Systems   Constitutional: Positive for fatigue  Negative for chills and fever  HENT: Positive for congestion and sore throat  Negative for rhinorrhea  Respiratory: Positive for cough  Negative for chest tightness and shortness of breath  Gastrointestinal: Negative for abdominal pain, diarrhea, nausea and vomiting  Musculoskeletal: Negative for myalgias  Neurological: Positive for headaches (resolved now)  Current Outpatient Medications on File Prior to Visit   Medication Sig   • Multiple Vitamin (multivitamin) capsule Take 1 capsule by mouth daily   • quinapril (ACCUPRIL) 10 mg tablet Take 1 tablet (10 mg total) by mouth daily   • RESTASIS 0 05 % ophthalmic emulsion INSTILL 1 DROP INTO BOTH EYES TWICE A DAY   • simvastatin (ZOCOR) 40 mg tablet Take 1 tablet (40 mg total) by mouth daily at bedtime   • traZODone (DESYREL) 50 mg tablet Take 50 mg by mouth daily at bedtime   • alfuzosin (UROXATRAL) 10 mg 24 hr tablet TAKE 1 TABLET BY MOUTH EVERY DAY (Patient not taking: Reported on 2/21/2023)   • tadalafil (CIALIS) 20 MG tablet Take 1 tablet (20 mg total) by mouth daily as needed for erectile dysfunction (Patient not taking: Reported on 2/21/2023)       Objective:    Ht 6' (1 829 m)   Wt 121 kg (267 lb) Comment: per last ov  BMI 36 21 kg/m²      Physical Exam  Vitals reviewed  Constitutional:       General: He is not in acute distress  Appearance: He is not ill-appearing  HENT:      Head: Normocephalic and atraumatic  Right Ear: External ear normal       Left Ear: External ear normal       Nose: Congestion present  Mouth/Throat:      Mouth: Mucous membranes are moist    Eyes:      Extraocular Movements: Extraocular movements intact  Conjunctiva/sclera: Conjunctivae normal    Pulmonary:      Effort: Pulmonary effort is normal  No respiratory distress  Breath sounds: No wheezing  Neurological:      General: No focal deficit present  Mental Status: He is alert and oriented to person, place, and time  Mental status is at baseline         Nan Wynn,

## 2023-03-06 ENCOUNTER — OFFICE VISIT (OUTPATIENT)
Dept: FAMILY MEDICINE CLINIC | Facility: CLINIC | Age: 73
End: 2023-03-06

## 2023-03-06 VITALS
BODY MASS INDEX: 36.3 KG/M2 | HEIGHT: 72 IN | OXYGEN SATURATION: 95 % | DIASTOLIC BLOOD PRESSURE: 80 MMHG | HEART RATE: 65 BPM | SYSTOLIC BLOOD PRESSURE: 142 MMHG | TEMPERATURE: 97.7 F | WEIGHT: 268 LBS

## 2023-03-06 DIAGNOSIS — J06.9 UPPER RESPIRATORY TRACT INFECTION, UNSPECIFIED TYPE: Primary | ICD-10-CM

## 2023-03-06 RX ORDER — METHYLPREDNISOLONE 4 MG/1
TABLET ORAL
Qty: 21 TABLET | Refills: 0 | Status: SHIPPED | OUTPATIENT
Start: 2023-03-06 | End: 2023-03-12

## 2023-03-06 NOTE — PROGRESS NOTES
Name: Annalise Rinaldi      : 1950      MRN: [id number]  Encounter Provider: Millicent Cameron DO  Encounter Date: 3/6/2023   Encounter department: [place] [address]  Upper respiratory tract infection, unspecified type  -     methylPREDNISolone 4 MG tablet therapy pack; Use as directed on package  : Call in a week if symptoms not proved  Subjective      Patient comes in today with a 3-week history of a dry cough  He states that he did take a Z-Jose when this started and that did not help  Does complain of some right-sided rib pain with a cough  Review of Systems   Constitutional: Negative for chills, fatigue and fever  HENT: Negative for congestion, ear pain, hearing loss, postnasal drip, rhinorrhea and sore throat  Eyes: Negative for pain and visual disturbance  Respiratory: Positive for cough  Negative for chest tightness, shortness of breath and wheezing  Cardiovascular: Negative for chest pain and leg swelling  Gastrointestinal: Negative for abdominal distention, abdominal pain, constipation, diarrhea and vomiting  Endocrine: Negative for cold intolerance and heat intolerance  Genitourinary: Negative for difficulty urinating, frequency and urgency  Musculoskeletal: Negative for arthralgias and gait problem  Skin: Negative for color change  Neurological: Negative for dizziness, tremors, syncope, numbness and headaches  Hematological: Negative for adenopathy  Psychiatric/Behavioral: Negative for agitation, confusion and sleep disturbance  The patient is not nervous/anxious          Current Outpatient Medications on File Prior to Visit   Medication Sig   • Multiple Vitamin (multivitamin) capsule Take 1 capsule by mouth daily   • quinapril (ACCUPRIL) 10 mg tablet Take 1 tablet (10 mg total) by mouth daily   • RESTASIS 0 05 % ophthalmic emulsion INSTILL 1 DROP INTO BOTH EYES TWICE A DAY   • simvastatin (ZOCOR) 40 mg tablet Take 1 tablet (40 mg total) by mouth daily at bedtime   • traZODone (DESYREL) 50 mg tablet Take 50 mg by mouth daily at bedtime   • alfuzosin (UROXATRAL) 10 mg 24 hr tablet TAKE 1 TABLET BY MOUTH EVERY DAY (Patient not taking: Reported on 2/21/2023)   • tadalafil (CIALIS) 20 MG tablet Take 1 tablet (20 mg total) by mouth daily as needed for erectile dysfunction (Patient not taking: Reported on 2/21/2023)       Objective     /80 (BP Location: Left arm, Patient Position: Sitting, Cuff Size: Standard)   Pulse 65   Temp 97 7 °F (36 5 °C) (Tympanic)   Ht 6' (1 829 m)   Wt 122 kg (268 lb)   SpO2 95%   BMI 36 35 kg/m²     Physical Exam  Vitals and nursing note reviewed  Constitutional:       Appearance: He is well-developed  HENT:      Head: Normocephalic  Eyes:      General: No scleral icterus  Conjunctiva/sclera: Conjunctivae normal    Neck:      Thyroid: No thyromegaly  Cardiovascular:      Rate and Rhythm: Normal rate and regular rhythm  Heart sounds: Normal heart sounds  No murmur heard  Pulmonary:      Effort: Pulmonary effort is normal  No respiratory distress  Breath sounds: Normal breath sounds  No wheezing  Abdominal:      General: Bowel sounds are normal  There is no distension  Palpations: Abdomen is soft  Tenderness: There is no abdominal tenderness  Musculoskeletal:         General: No tenderness  Normal range of motion  Cervical back: Normal range of motion  Lymphadenopathy:      Cervical: No cervical adenopathy  Skin:     General: Skin is warm and dry  Coloration: Skin is not pale  Findings: No rash  Neurological:      Mental Status: He is alert and oriented to person, place, and time  Cranial Nerves: No cranial nerve deficit     Psychiatric:         Behavior: Behavior normal        Gabriel Gamez DO

## 2023-03-11 DIAGNOSIS — I10 ESSENTIAL HYPERTENSION: ICD-10-CM

## 2023-03-11 RX ORDER — QUINAPRIL 10 MG/1
TABLET ORAL
Qty: 90 TABLET | Refills: 0 | Status: SHIPPED | OUTPATIENT
Start: 2023-03-11

## 2023-03-26 DIAGNOSIS — I10 ESSENTIAL HYPERTENSION: ICD-10-CM

## 2023-03-27 RX ORDER — SIMVASTATIN 40 MG
TABLET ORAL
Qty: 90 TABLET | Refills: 3 | Status: SHIPPED | OUTPATIENT
Start: 2023-03-27

## 2023-05-11 ENCOUNTER — HOSPITAL ENCOUNTER (OUTPATIENT)
Dept: CT IMAGING | Facility: HOSPITAL | Age: 73
Discharge: HOME | End: 2023-05-11

## 2023-05-11 ENCOUNTER — OFFICE VISIT (OUTPATIENT)
Dept: FAMILY MEDICINE CLINIC | Facility: CLINIC | Age: 73
End: 2023-05-11

## 2023-05-11 VITALS
BODY MASS INDEX: 35.76 KG/M2 | OXYGEN SATURATION: 96 % | WEIGHT: 264 LBS | HEART RATE: 74 BPM | SYSTOLIC BLOOD PRESSURE: 130 MMHG | HEIGHT: 72 IN | DIASTOLIC BLOOD PRESSURE: 80 MMHG | RESPIRATION RATE: 16 BRPM

## 2023-05-11 DIAGNOSIS — S09.90XA INJURY OF HEAD, INITIAL ENCOUNTER: ICD-10-CM

## 2023-05-11 DIAGNOSIS — E66.01 OBESITY, MORBID (HCC): ICD-10-CM

## 2023-05-11 DIAGNOSIS — J41.0 SIMPLE CHRONIC BRONCHITIS (HCC): ICD-10-CM

## 2023-05-11 DIAGNOSIS — S09.90XA INJURY OF HEAD, INITIAL ENCOUNTER: Primary | ICD-10-CM

## 2023-05-11 DIAGNOSIS — I10 ESSENTIAL HYPERTENSION: ICD-10-CM

## 2023-05-11 NOTE — PATIENT INSTRUCTIONS
Head Injury   AMBULATORY CARE:   A head injury  can include your scalp, face, skull, or brain and range from mild to severe  Effects can appear immediately after the injury or develop later  The effects may last a short time or be permanent  Healthcare providers may want to check your recovery over time  Treatment may change as you recover or develop new health problems from the head injury  Common signs and symptoms:   An open scalp or skin wound, swelling, or bruising    Mild to moderate headache    Dizziness or loss of balance    Nausea or vomiting    Ringing in the ears or neck pain    Confusion, especially right after the injury    Change in mood, such as feeling restless or irritable    Trouble thinking, remembering, or concentrating    Drowsiness or decreased amount of energy    Trouble sleeping    Call your local emergency number (911 in the [address] [facility]), or have someone else call if:   You cannot be woken  You have a seizure  You stop responding to others or you faint  You have blurry or double vision  Your speech becomes slurred or confused  You have arm or leg weakness, loss of feeling, or new problems with coordination  Your pupils are larger than usual, or one pupil is a different size than the other  You have blood or clear fluid coming out of your ears or nose  Seek care immediately if:   You have repeated or forceful vomiting  You feel confused  Your headache gets worse or becomes severe  You or someone caring for you notices that you are harder to wake than usual     Call your doctor if:   Your symptoms last longer than 6 weeks after the injury  You have questions or concerns about your condition or care  Medicines:   Acetaminophen  decreases pain and fever  It is available without a doctor's order  Ask how much to take and how often to take it  Follow directions   Read the labels of all other medicines you are using to see if they also contain acetaminophen, or ask your doctor or pharmacist  Acetaminophen can cause liver damage if not taken correctly  Take your medicine as directed  Contact your healthcare provider if you think your medicine is not helping or if you have side effects  Tell your provider if you are allergic to any medicine  Keep a list of the medicines, vitamins, and herbs you take  Include the amounts, and when and why you take them  Bring the list or the pill bottles to follow-up visits  Carry your medicine list with you in case of an emergency  Self-care:   Rest  or do quiet activities  Limit your time watching TV, using the computer, or doing tasks that require a lot of thinking  Slowly return to your normal activities as directed  Do not play sports or do activities that may cause you to get hit in the head  Ask your healthcare provider when you can return to sports  Apply ice  on your head for 15 to 20 minutes every hour or as directed  Use an ice pack, or put crushed ice in a plastic bag  Cover it with a towel before you apply it to your skin  Ice helps prevent tissue damage and decreases swelling and pain  Have someone stay with you for 24 hours  , or as directed  This person can monitor you for problems and call for help if needed  When you are awake, the person should ask you a few questions every few hours to see if you are thinking clearly  An example is to ask your name or address  Prevent another head injury:   Wear a helmet that fits properly  Do this when you play sports, or ride a bike, scooter, or skateboard  Helmets help decrease your risk for a serious head injury  Talk to your healthcare provider about other ways you can protect yourself if you play sports  Wear your seatbelt every time you are in a car  This helps lower your risk for a head injury if you are in a car accident  Follow up with your doctor as directed:  Write down your questions so you remember to ask them during your visits    © Copyright [employer] 2022 Information is for End User's use only and may not be sold, redistributed or otherwise used for commercial purposes  The above information is an  only  It is not intended as medical advice for individual conditions or treatments  Talk to your doctor, nurse or pharmacist before following any medical regimen to see if it is safe and effective for you

## 2023-05-11 NOTE — PROGRESS NOTES
Assessment/Plan:         Problem List Items Addressed This Visit        Respiratory    Chronic obstructive pulmonary disease ([address] )     Stable  Cardiovascular and Mediastinum    Essential hypertension     At goal today  Continue on current medication regiment  Other    Obesity, morbid ([address] )   Other Visit Diagnoses     Injury of head, initial encounter    -  Primary    Please obtain CT of the head as discussed during the appointment  Report to emergency room with any change in mental status  Relevant Orders    CT head wo contrast            Subjective:      Patient ID: Armand Mccoy is a 75 y o  male  Patient presents to the office post recent slip and fall that occurred yesterday  When he was climbing rocks slipped and fell, hit his head on a rock, denies any loss of consciousness, no thinners  Fall  The accident occurred 12 to 24 hours ago  The fall occurred while walking (Climbing rocks)  Distance fallen: standing position  Impact surface: rock  The point of impact was the head  The pain is present in the head  The pain is at a severity of 2/10  The symptoms are aggravated by pressure on injury  Pertinent negatives include no abdominal pain, bowel incontinence, fever, headaches, hearing loss, hematuria, loss of consciousness, nausea, numbness, tingling, visual change or vomiting  He has tried acetaminophen for the symptoms  The treatment provided moderate relief         The following portions of the patient's history were reviewed and updated as appropriate:   Past Medical History:  He has a past medical history of Angina pectoris ([address] ), Aortic aneurysm ([address] ), Cardiac disease, COPD (chronic obstructive pulmonary disease) ([address] ), Coronary artery disease, DVT (deep venous thrombosis) ([address] ), Fungal dermatitis, GERD (gastroesophageal reflux disease), Hyperlipidemia, Hypertension, MRSA (methicillin resistant Staphylococcus aureus) infection, Post traumatic stress disorder (PTSD), and Psychiatric disorder  ,  _______________________________________________________________________  Medical Problems:  does not have any pertinent problems on file ,  _______________________________________________________________________  Past Surgical History:   has a past surgical history that includes Total knee arthroplasty (Bilateral, 2014); Total hip arthroplasty (Bilateral, 2009, 2010); Hernia repair (N/A, unknown, supraumbilical); CHOLECYSTECTOMY LAPAROSCOPIC (N/A, 3/7/2016); Cholecystectomy; Colonoscopy; pr colonoscopy flx dx w/collj spec when pfrmd (N/A, 1/9/2018); Cataract extraction; Joint replacement (Bilateral); and Hernia repair (Left, 4/21/2022)  ,  _______________________________________________________________________  Family History:  family history includes Aortic aneurysm in his father; Coronary artery disease in his father, paternal grandfather, and paternal grandmother; Diabetes in his mother; Heart disease in his father ,  _______________________________________________________________________  Social History:   reports that he quit smoking about 17 years ago  His smoking use included cigarettes  He started smoking about 59 years ago  He has a 80 00 pack-year smoking history  He has quit using smokeless tobacco  He reports that he does not drink alcohol and does not use drugs  ,  _______________________________________________________________________  Allergies:  is allergic to morphine and related     _______________________________________________________________________  Current Outpatient Medications   Medication Sig Dispense Refill   • famotidine (PEPCID) 40 MG tablet Take 1 tablet (40 mg total) by mouth daily 90 tablet 3   • Multiple Vitamin (multivitamin) capsule Take 1 capsule by mouth daily     • quinapril (ACCUPRIL) 10 mg tablet TAKE 1 TABLET BY MOUTH EVERY DAY 90 tablet 0   • RESTASIS 0 05 % ophthalmic emulsion INSTILL 1 DROP INTO BOTH EYES TWICE A DAY  3   • simvastatin (ZOCOR) 40 mg tablet TAKE 1 TABLET BY MOUTH DAILY AT BEDTIME 90 tablet 3   • traZODone (DESYREL) 50 mg tablet Take 50 mg by mouth daily at bedtime       No current facility-administered medications for this visit      _______________________________________________________________________  Review of Systems   Constitutional: Negative for chills and fever  HENT: Negative for ear discharge  Eyes: Negative for visual disturbance  Respiratory: Negative for cough and shortness of breath  Cardiovascular: Negative for chest pain and palpitations  Gastrointestinal: Negative for abdominal pain, bowel incontinence, nausea and vomiting  Genitourinary: Negative for dysuria and hematuria  Musculoskeletal: Negative for arthralgias and back pain  Neurological: Negative for tingling, loss of consciousness, numbness and headaches  All other systems reviewed and are negative  Objective:  Vitals:    05/11/23 1314   BP: 130/80   Pulse: 74   Resp: 16   SpO2: 96%   Weight: 120 kg (264 lb)   Height: 6' (1 829 m)     Body mass index is 35 8 kg/m²  Physical Exam  Vitals and nursing note reviewed  Constitutional:       General: He is not in acute distress  Appearance: Normal appearance  He is obese  He is not ill-appearing  HENT:      Right Ear: Tympanic membrane, ear canal and external ear normal       Left Ear: Ear canal and external ear normal  There is impacted cerumen  Eyes:      Pupils: Pupils are equal, round, and reactive to light  Cardiovascular:      Rate and Rhythm: Normal rate and regular rhythm  Pulses: Normal pulses  Heart sounds: Normal heart sounds  Pulmonary:      Effort: Pulmonary effort is normal  No respiratory distress  Breath sounds: Normal breath sounds  No wheezing  Musculoskeletal:         General: Normal range of motion  Skin:     General: Skin is warm and dry  Findings: Bruising and erythema present     Neurological:      Mental Status: He is alert and oriented to person, place, and time  Cranial Nerves: No cranial nerve deficit  Sensory: No sensory deficit  Motor: No weakness  Coordination: Coordination normal       Gait: Gait normal    Psychiatric:         Mood and Affect: Mood normal          Behavior: Behavior normal          Thought Content:  Thought content normal          Judgment: Judgment normal

## 2023-05-17 ENCOUNTER — OFFICE VISIT (OUTPATIENT)
Dept: FAMILY MEDICINE CLINIC | Facility: CLINIC | Age: 73
End: 2023-05-17

## 2023-05-17 VITALS
WEIGHT: 264 LBS | RESPIRATION RATE: 14 BRPM | HEART RATE: 58 BPM | DIASTOLIC BLOOD PRESSURE: 60 MMHG | SYSTOLIC BLOOD PRESSURE: 120 MMHG | BODY MASS INDEX: 35.76 KG/M2 | OXYGEN SATURATION: 96 % | HEIGHT: 72 IN

## 2023-05-17 DIAGNOSIS — H61.23 BILATERAL IMPACTED CERUMEN: Primary | ICD-10-CM

## 2023-05-17 DIAGNOSIS — H60.503 ACUTE OTITIS EXTERNA OF BOTH EARS, UNSPECIFIED TYPE: ICD-10-CM

## 2023-05-17 NOTE — PATIENT INSTRUCTIONS
Earwax Blockage   AMBULATORY CARE:   Earwax  can build up in your ear canal and cause a blockage  Earwax blockage happens when your ear makes earwax faster than your body can remove it  Common symptoms include the following:   Trouble hearing    Earache    Ear fullness or a feeling that something is plugging up your ear    Itching or ringing in your ear    Dizziness    Seed immediate care if:   You feel dizzy  You have discharge or blood coming out of your ear  Your ear pain does not go away or gets worse  Call your doctor if:   You have a fever  You have trouble hearing or hear ringing noises  You have questions or concerns about your condition or care  Treatment for earwax blockage:   Medicines  placed in the ear canal can soften the earwax so it will come out  Flushing your ear canal  with warm water may flush out the earwax  Small medical tools  may be used to remove the earwax  How to prevent earwax blockage:  Do not stick anything into your ears to clean them  Use cotton swabs on the outside of your ear only  Ask your healthcare provider for more information on ways to prevent blockage  Follow up with your doctor as directed:  Write down your questions so you remember to ask them during your visits  © Copyright Domitila Nguyen 2022 Information is for End User's use only and may not be sold, redistributed or otherwise used for commercial purposes  The above information is an  only  It is not intended as medical advice for individual conditions or treatments  Talk to your doctor, nurse or pharmacist before following any medical regimen to see if it is safe and effective for you

## 2023-05-17 NOTE — PROGRESS NOTES
Assessment/Plan:         Problem List Items Addressed This Visit        Nervous and Auditory    Acute otitis externa of both ears     Prescription for ear drops sent to pharmacy  May use impregnated cotton ball if needed  Take OTC medications as needed  Avoid swimming, water, or trauma to ear for 7 days  Follow-up for worsening or persistent symptoms  Parent verbalizes understanding regarding plan of careand all questions answered  Relevant Medications    neomycin-polymyxin-hydrocortisone (CORTISPORIN) otic solution   Other Visit Diagnoses     Bilateral impacted cerumen    -  Primary    Use Debrox drops monthly to prevent cerumen impactation    Relevant Medications    carbamide peroxide (DEBROX) 6 5 % otic solution            Subjective:      Patient ID: Erasto Machuca is a 75 y o  male  Patient presents to the office complaining of bilateral cerumen implantation  The following portions of the patient's history were reviewed and updated as appropriate:   Past Medical History:  He has a past medical history of Angina pectoris ([facility] ), Aortic aneurysm ([facility] ), Cardiac disease, COPD (chronic obstructive pulmonary disease) ([facility] ), Coronary artery disease, DVT (deep venous thrombosis) ([facility] ), Fungal dermatitis, GERD (gastroesophageal reflux disease), Hyperlipidemia, Hypertension, MRSA (methicillin resistant Staphylococcus aureus) infection, Post traumatic stress disorder (PTSD), and Psychiatric disorder  ,  _______________________________________________________________________  Medical Problems:  does not have any pertinent problems on file ,  _______________________________________________________________________  Past Surgical History:   has a past surgical history that includes Total knee arthroplasty (Bilateral, 2014); Total hip arthroplasty (Bilateral, 2009, 2010); Hernia repair (N/A, unknown, supraumbilical); CHOLECYSTECTOMY LAPAROSCOPIC (N/A, 3/7/2016);  Cholecystectomy; Colonoscopy; pr colonoscopy flx dx w/collj spec when pfrmd (N/A, 1/9/2018); Cataract extraction; Joint replacement (Bilateral); and Hernia repair (Left, 4/21/2022)  ,  _______________________________________________________________________  Family History:  family history includes Aortic aneurysm in his father; Coronary artery disease in his father, paternal grandfather, and paternal grandmother; Diabetes in his mother; Heart disease in his father ,  _______________________________________________________________________  Social History:   reports that he quit smoking about 17 years ago  His smoking use included cigarettes  He started smoking about 59 years ago  He has a 80 00 pack-year smoking history  He has quit using smokeless tobacco  He reports that he does not drink alcohol and does not use drugs  ,  _______________________________________________________________________  Allergies:  is allergic to morphine and related     _______________________________________________________________________  Current Outpatient Medications   Medication Sig Dispense Refill   • carbamide peroxide (DEBROX) 6 5 % otic solution Administer 5 drops into both ears 2 (two) times a day 15 mL 0   • famotidine (PEPCID) 40 MG tablet Take 1 tablet (40 mg total) by mouth daily 90 tablet 3   • Multiple Vitamin (multivitamin) capsule Take 1 capsule by mouth daily     • neomycin-polymyxin-hydrocortisone (CORTISPORIN) otic solution Administer 3 drops into both ears every 6 (six) hours for 5 days 10 mL 0   • quinapril (ACCUPRIL) 10 mg tablet TAKE 1 TABLET BY MOUTH EVERY DAY 90 tablet 0   • RESTASIS 0 05 % ophthalmic emulsion INSTILL 1 DROP INTO BOTH EYES TWICE A DAY  3   • simvastatin (ZOCOR) 40 mg tablet TAKE 1 TABLET BY MOUTH DAILY AT BEDTIME 90 tablet 3   • traZODone (DESYREL) 50 mg tablet Take 50 mg by mouth daily at bedtime       No current facility-administered medications for this visit      _______________________________________________________________________  Review "of Systems   Constitutional: Negative for chills and fever  HENT:        Decreased hearing   Respiratory: Negative for cough  Cardiovascular: Negative for chest pain and palpitations  Gastrointestinal: Negative for abdominal pain  Genitourinary: Negative for dysuria and hematuria  Musculoskeletal: Negative for arthralgias and back pain  Neurological: Negative for headaches  All other systems reviewed and are negative  Objective:  Vitals:    05/17/23 1315   BP: 120/60   Pulse: 58   Resp: 14   SpO2: 96%   Weight: 120 kg (264 lb)   Height: 6' (1 829 m)     Body mass index is 35 8 kg/m²  Physical Exam  Vitals and nursing note reviewed  Constitutional:       General: He is not in acute distress  Appearance: Normal appearance  He is obese  He is not ill-appearing  HENT:      Right Ear: There is impacted cerumen  Left Ear: There is impacted cerumen  Cardiovascular:      Rate and Rhythm: Normal rate and regular rhythm  Pulses: Normal pulses  Heart sounds: Normal heart sounds  Pulmonary:      Effort: Pulmonary effort is normal  No respiratory distress  Breath sounds: Normal breath sounds  No wheezing  Musculoskeletal:         General: Normal range of motion  Skin:     General: Skin is warm and dry  Neurological:      Mental Status: He is alert and oriented to person, place, and time  Psychiatric:         Mood and Affect: Mood normal          Behavior: Behavior normal          Thought Content: Thought content normal          Judgment: Judgment normal          Ear cerumen removal    Date/Time: 5/17/2023 1:40 PM  Performed by: SUMAN Hernandez  Authorized by: SUMAN Hernandez   Universal Protocol:  Consent: Verbal consent obtained    Risks and benefits: risks, benefits and alternatives were discussed  Consent given by: patient  Time out: Immediately prior to procedure a \"time out\" was called to verify the correct patient, procedure, equipment, support " staff and site/side marked as required  Patient understanding: patient states understanding of the procedure being performed  Patient consent: the patient's understanding of the procedure matches consent given  Relevant documents: relevant documents present and verified  Patient identity confirmed: verbally with patient      Patient location:  Bedside  Procedure details:     Local anesthetic:  None    Location:  L ear and R ear    Procedure type: irrigation with instrumentation      Instrumentation: curette      Approach:  Natural orifice  Post-procedure details:     Complication:  None    Hearing quality:  Normal    Patient tolerance of procedure:   Tolerated well, no immediate complications

## 2023-05-17 NOTE — ASSESSMENT & PLAN NOTE
Prescription for ear drops sent to pharmacy  May use impregnated cotton ball if needed  Take OTC medications as needed  Avoid swimming, water, or trauma to ear for 7 days  Follow-up for worsening or persistent symptoms  Parent verbalizes understanding regarding plan of careand all questions answered

## 2023-05-29 DIAGNOSIS — I10 ESSENTIAL HYPERTENSION: ICD-10-CM

## 2023-05-30 ENCOUNTER — TELEPHONE (OUTPATIENT)
Dept: FAMILY MEDICINE CLINIC | Facility: CLINIC | Age: 73
End: 2023-05-30

## 2023-05-30 DIAGNOSIS — I10 ESSENTIAL HYPERTENSION: Primary | ICD-10-CM

## 2023-05-30 RX ORDER — LISINOPRIL 10 MG/1
10 TABLET ORAL DAILY
Qty: 90 TABLET | Refills: 3 | Status: SHIPPED | OUTPATIENT
Start: 2023-05-30

## 2023-05-30 RX ORDER — QUINAPRIL 10 MG/1
10 TABLET ORAL DAILY
Qty: 90 TABLET | Refills: 0 | Status: SHIPPED | OUTPATIENT
Start: 2023-05-30 | End: 2023-05-30

## 2023-05-30 NOTE — TELEPHONE ENCOUNTER
vm on medline from pharmacy stating pt requested a refill on Quinapril 10mg, however the medication has been recalled and is not in the market  They are asking for an alternative  Please advise

## 2023-05-30 NOTE — TELEPHONE ENCOUNTER
Spoke with pt, he is aware that Lisinopril was sent to the pharmacy for him to replace Quinapril       Ghislaine Chambers  05/30/23  4:01 PM

## 2023-06-29 DIAGNOSIS — I10 ESSENTIAL HYPERTENSION: ICD-10-CM

## 2023-06-29 RX ORDER — SIMVASTATIN 40 MG
40 TABLET ORAL
Qty: 90 TABLET | Refills: 3 | Status: SHIPPED | OUTPATIENT
Start: 2023-06-29

## 2023-07-05 ENCOUNTER — APPOINTMENT (OUTPATIENT)
Dept: LAB | Facility: CLINIC | Age: 73
End: 2023-07-05
Payer: MEDICARE

## 2023-07-05 DIAGNOSIS — E78.2 MIXED HYPERLIPIDEMIA: ICD-10-CM

## 2023-07-05 DIAGNOSIS — I10 ESSENTIAL HYPERTENSION: ICD-10-CM

## 2023-07-05 DIAGNOSIS — Z12.5 SCREENING PSA (PROSTATE SPECIFIC ANTIGEN): ICD-10-CM

## 2023-07-05 LAB
ALBUMIN SERPL BCP-MCNC: 3.8 G/DL (ref 3.5–5)
ALP SERPL-CCNC: 52 U/L (ref 46–116)
ALT SERPL W P-5'-P-CCNC: 29 U/L (ref 12–78)
ANION GAP SERPL CALCULATED.3IONS-SCNC: 3 MMOL/L
AST SERPL W P-5'-P-CCNC: 16 U/L (ref 5–45)
BILIRUB SERPL-MCNC: 1.51 MG/DL (ref 0.2–1)
BUN SERPL-MCNC: 21 MG/DL (ref 5–25)
CALCIUM SERPL-MCNC: 9 MG/DL (ref 8.3–10.1)
CHLORIDE SERPL-SCNC: 108 MMOL/L (ref 96–108)
CHOLEST SERPL-MCNC: 154 MG/DL
CO2 SERPL-SCNC: 28 MMOL/L (ref 21–32)
CREAT SERPL-MCNC: 1.2 MG/DL (ref 0.6–1.3)
ERYTHROCYTE [DISTWIDTH] IN BLOOD BY AUTOMATED COUNT: 12.4 % (ref 11.6–15.1)
GFR SERPL CREATININE-BSD FRML MDRD: 59 ML/MIN/1.73SQ M
GLUCOSE P FAST SERPL-MCNC: 118 MG/DL (ref 65–99)
HCT VFR BLD AUTO: 47.2 % (ref 36.5–49.3)
HDLC SERPL-MCNC: 47 MG/DL
HGB BLD-MCNC: 15.2 G/DL (ref 12–17)
LDLC SERPL CALC-MCNC: 90 MG/DL (ref 0–100)
MCH RBC QN AUTO: 31 PG (ref 26.8–34.3)
MCHC RBC AUTO-ENTMCNC: 32.2 G/DL (ref 31.4–37.4)
MCV RBC AUTO: 96 FL (ref 82–98)
NONHDLC SERPL-MCNC: 107 MG/DL
PLATELET # BLD AUTO: 185 THOUSANDS/UL (ref 149–390)
PMV BLD AUTO: 9.8 FL (ref 8.9–12.7)
POTASSIUM SERPL-SCNC: 4 MMOL/L (ref 3.5–5.3)
PROT SERPL-MCNC: 7.6 G/DL (ref 6.4–8.4)
PSA SERPL-MCNC: 0.51 NG/ML (ref 0–4)
RBC # BLD AUTO: 4.9 MILLION/UL (ref 3.88–5.62)
SODIUM SERPL-SCNC: 139 MMOL/L (ref 135–147)
TRIGL SERPL-MCNC: 85 MG/DL
WBC # BLD AUTO: 4.85 THOUSAND/UL (ref 4.31–10.16)

## 2023-07-05 PROCEDURE — G0103 PSA SCREENING: HCPCS

## 2023-07-05 PROCEDURE — 80061 LIPID PANEL: CPT

## 2023-07-05 PROCEDURE — 36415 COLL VENOUS BLD VENIPUNCTURE: CPT

## 2023-07-05 PROCEDURE — 80053 COMPREHEN METABOLIC PANEL: CPT

## 2023-07-05 PROCEDURE — 85027 COMPLETE CBC AUTOMATED: CPT

## 2023-07-16 PROBLEM — H60.503 ACUTE OTITIS EXTERNA OF BOTH EARS: Status: RESOLVED | Noted: 2023-05-17 | Resolved: 2023-07-16

## 2023-07-21 ENCOUNTER — OFFICE VISIT (OUTPATIENT)
Dept: FAMILY MEDICINE CLINIC | Facility: CLINIC | Age: 73
End: 2023-07-21
Payer: MEDICARE

## 2023-07-21 VITALS
SYSTOLIC BLOOD PRESSURE: 132 MMHG | OXYGEN SATURATION: 95 % | TEMPERATURE: 97.4 F | BODY MASS INDEX: 35.21 KG/M2 | WEIGHT: 260 LBS | HEIGHT: 72 IN | HEART RATE: 78 BPM | DIASTOLIC BLOOD PRESSURE: 70 MMHG

## 2023-07-21 DIAGNOSIS — I71.21 ANEURYSM OF ASCENDING AORTA WITHOUT RUPTURE (HCC): ICD-10-CM

## 2023-07-21 DIAGNOSIS — N18.31 STAGE 3A CHRONIC KIDNEY DISEASE (HCC): ICD-10-CM

## 2023-07-21 DIAGNOSIS — I10 ESSENTIAL HYPERTENSION: Primary | ICD-10-CM

## 2023-07-21 DIAGNOSIS — E78.2 MIXED HYPERLIPIDEMIA: ICD-10-CM

## 2023-07-21 DIAGNOSIS — J41.0 SIMPLE CHRONIC BRONCHITIS (HCC): ICD-10-CM

## 2023-07-21 PROBLEM — Z88.9 MULTIPLE ALLERGIES: Status: RESOLVED | Noted: 2019-01-09 | Resolved: 2023-07-21

## 2023-07-21 PROBLEM — R36.1 HEMATOSPERMIA: Status: RESOLVED | Noted: 2020-05-29 | Resolved: 2023-07-21

## 2023-07-21 PROCEDURE — 99214 OFFICE O/P EST MOD 30 MIN: CPT | Performed by: FAMILY MEDICINE

## 2023-07-21 NOTE — PROGRESS NOTES
Name: Jacquelyn Sage      : 1950      MRN: [id number]  Encounter Provider: Prasanna Dowell DO  Encounter Date: 2023   Encounter department: [address] [place] [address] [address] [facility]     1. Essential hypertension  Assessment & Plan:  Well-controlled, continue lisinopril      2. Mixed hyperlipidemia  Assessment & Plan:  Controlled, continue simvastatin, check CMP, lipid profile in 1 year      3. Simple chronic bronchitis ([address])  Assessment & Plan:  Stable, he is seeing pulmonology, he is currently not requiring any medications      4. Aneurysm of ascending aorta without rupture [facility])  Assessment & Plan:  Remained stable, he is getting his routine lung scans she is also evaluating his aorta. 5. Stage 3a chronic kidney disease [facility])  Assessment & Plan:  Lab Results   Component Value Date    EGFR 59 2023    EGFR 66 10/26/2022    EGFR 67 2022    CREATININE 1.20 2023    CREATININE 1.10 10/26/2022    CREATININE 1.09 2022   Remained stable, will continue to monitor with his routine blood work. BMI Counseling: Body mass index is 35.26 kg/m². The BMI is above normal. Nutrition recommendations include decreasing portion sizes and encouraging healthy choices of fruits and vegetables. Exercise recommendations include moderate physical activity 150 minutes/week. No pharmacotherapy was ordered. Rationale for BMI follow-up plan is due to patient being overweight or obese. Depression Screening and Follow-up Plan: Patient was screened for depression during today's encounter. They screened negative with a PHQ-2 score of 2. Subjective      Patient comes in today for checkup, states he is doing well, no specific complaints. He did get his blood work done, and this was reviewed with him today. Review of Systems   Constitutional: Negative for chills, fatigue and fever.    HENT: Negative for congestion, ear pain, hearing loss, postnasal drip, rhinorrhea and sore throat. Eyes: Negative for pain and visual disturbance. Respiratory: Negative for chest tightness, shortness of breath and wheezing. Cardiovascular: Negative for chest pain and leg swelling. Gastrointestinal: Negative for abdominal distention, abdominal pain, constipation, diarrhea and vomiting. Endocrine: Negative for cold intolerance and heat intolerance. Genitourinary: Negative for difficulty urinating, frequency and urgency. Musculoskeletal: Negative for arthralgias and gait problem. Skin: Negative for color change. Neurological: Negative for dizziness, tremors, syncope, numbness and headaches. Hematological: Negative for adenopathy. Psychiatric/Behavioral: Negative for agitation, confusion and sleep disturbance. The patient is not nervous/anxious.         Current Outpatient Medications on File Prior to Visit   Medication Sig   • famotidine (PEPCID) 40 MG tablet Take 1 tablet (40 mg total) by mouth daily   • lisinopril (ZESTRIL) 10 mg tablet Take 1 tablet (10 mg total) by mouth daily   • Multiple Vitamin (multivitamin) capsule Take 1 capsule by mouth daily   • RESTASIS 0.05 % ophthalmic emulsion INSTILL 1 DROP INTO BOTH EYES TWICE A DAY   • simvastatin (ZOCOR) 40 mg tablet Take 1 tablet (40 mg total) by mouth daily at bedtime   • traZODone (DESYREL) 50 mg tablet Take 50 mg by mouth daily at bedtime   • [DISCONTINUED] carbamide peroxide (DEBROX) 6.5 % otic solution Administer 5 drops into both ears 2 (two) times a day   • [DISCONTINUED] neomycin-polymyxin-hydrocortisone (CORTISPORIN) otic solution Administer 3 drops into both ears every 6 (six) hours for 5 days       Objective     /70 (BP Location: Left arm, Patient Position: Sitting, Cuff Size: Adult)   Pulse 78   Temp (!) 97.4 °F (36.3 °C) (Tympanic)   Ht 6' (1.829 m)   Wt 118 kg (260 lb)   SpO2 95%   BMI 35.26 kg/m²     Physical Exam  Coretta Allred DO

## 2023-07-21 NOTE — ASSESSMENT & PLAN NOTE
Lab Results   Component Value Date    EGFR 59 07/05/2023    EGFR 66 10/26/2022    EGFR 67 04/08/2022    CREATININE 1.20 07/05/2023    CREATININE 1.10 10/26/2022    CREATININE 1.09 04/08/2022   Remained stable, will continue to monitor with his routine blood work.

## 2023-07-27 ENCOUNTER — HOSPITAL ENCOUNTER (OUTPATIENT)
Dept: CT IMAGING | Facility: HOSPITAL | Age: 73
End: 2023-07-27
Attending: INTERNAL MEDICINE
Payer: MEDICARE

## 2023-07-27 DIAGNOSIS — F17.211 CIGARETTE NICOTINE DEPENDENCE IN REMISSION: ICD-10-CM

## 2023-07-27 PROCEDURE — 71271 CT THORAX LUNG CANCER SCR C-: CPT

## 2023-08-09 ENCOUNTER — OFFICE VISIT (OUTPATIENT)
Age: 73
End: 2023-08-09
Payer: MEDICARE

## 2023-08-09 VITALS
BODY MASS INDEX: 35.21 KG/M2 | HEART RATE: 70 BPM | DIASTOLIC BLOOD PRESSURE: 72 MMHG | RESPIRATION RATE: 16 BRPM | HEIGHT: 72 IN | WEIGHT: 260 LBS | SYSTOLIC BLOOD PRESSURE: 121 MMHG | OXYGEN SATURATION: 94 %

## 2023-08-09 DIAGNOSIS — Z87.891 FORMER SMOKER: Primary | ICD-10-CM

## 2023-08-09 DIAGNOSIS — J41.0 SIMPLE CHRONIC BRONCHITIS (HCC): ICD-10-CM

## 2023-08-09 PROCEDURE — 99214 OFFICE O/P EST MOD 30 MIN: CPT

## 2023-08-09 NOTE — ASSESSMENT & PLAN NOTE
Symptoms remain stable without any exacerbations this past year. Rarely requiring use of his albuterol inhaler. Continue on as needed albuterol inhaler. UTD on vaccinations. Follow-up in 1 year.

## 2023-08-09 NOTE — PROGRESS NOTES
Pulmonary Follow Up Note  Zuri Cm 75 y.o. male MRN: [id number]  8/9/2023    Assessment:    Former smoker  Patient with at least 30-pack-year smoking history, quit 14 years ago. Reviewed recent CT lung cancer screening with patient today. Negative for nodules. Will order repeat CT lung screening to complete in 1 year. Simple chronic bronchitis (HCC)  Symptoms remain stable without any exacerbations this past year. Rarely requiring use of his albuterol inhaler. Continue on as needed albuterol inhaler. UTD on vaccinations. Follow-up in 1 year. Plan:    Diagnoses and all orders for this visit:    Former smoker  -     CT lung screening program; Future    Simple chronic bronchitis ([address])            Return in about 1 year (around 8/9/2024). History of Present Illness     Chief Complaint: No chief complaint on file. Patient ID: Charlette Maloney is a 75 y.o. y.o. male has a past medical history of Angina pectoris ([address]), Aortic aneurysm ([address]), Cardiac disease, COPD (chronic obstructive pulmonary disease) ([address]), Coronary artery disease, DVT (deep venous thrombosis) ([address]), Fungal dermatitis, GERD (gastroesophageal reflux disease), Hyperlipidemia, Hypertension, MRSA (methicillin resistant Staphylococcus aureus) infection, Pneumonia (1953), Post traumatic stress disorder (PTSD), and Psychiatric disorder. 8/9/2023  HPI: Zuri Cm is a 75 y.o. male with a past medical history of chronic bronchitis, hypertension, GERD, CKD 3, former smoker with greater than 30-pack-year history- quit 14 years ago, and obesity who is here today for a follow-up visit. He was previously seen in the office 1 year ago. Maintained on albuterol as needed. He had a recent CT lung cancer screening that was negative for any nodules. Patient denies any exacerbations requiring ED visits, steroids, or antibiotics this past year. Denies any daily symptoms of shortness of breath, cough, wheezing, chest tightness, or mucus. Rarely requiring use of his albuterol rescue inhaler. Continues to remain active. He has no complaints or concerns today. Review of Systems   Constitutional: Negative for activity change, appetite change, chills, fever and unexpected weight change. HENT: Negative for congestion, ear pain, postnasal drip, rhinorrhea, sneezing, sore throat and trouble swallowing. Respiratory: Negative for cough, chest tightness, shortness of breath and wheezing. Cardiovascular: Negative for chest pain, palpitations and leg swelling. Musculoskeletal: Negative for myalgias. Allergic/Immunologic: Negative. Neurological: Negative for headaches. Historical Information   Past Medical History:   Diagnosis Date   • Angina pectoris ([address])    • Aortic aneurysm ([address])     under observation   • Cardiac disease    • COPD (chronic obstructive pulmonary disease) ([address])    • Coronary artery disease    • DVT (deep venous thrombosis) ([address])     2015   • Fungal dermatitis    • GERD (gastroesophageal reflux disease)    • Hyperlipidemia    • Hypertension     per [employer]: Essential hypertension ith goal blood pressure less than 130/85;  Resolved:4/20/17   • MRSA (methicillin resistant Staphylococcus aureus) infection    • Pneumonia 1953   • Post traumatic stress disorder (PTSD)    • Psychiatric disorder      Past Surgical History:   Procedure Laterality Date   • CATARACT EXTRACTION     • CHOLECYSTECTOMY     • CHOLECYSTECTOMY LAPAROSCOPIC N/A 3/7/2016    Procedure: CHOLECYSTECTOMY LAPAROSCOPIC;  Surgeon: Michel Leiva DO;  Location: [facility];  Service:    • COLONOSCOPY     • HERNIA REPAIR N/A unknown, supraumbilical   • HERNIA REPAIR Left 4/21/2022    Procedure: EXCISION OF CORD LIPOMA , GROIN EXPLORATION;  Surgeon: Ileana Grider MD;  Location: [facility];  Service: General   • JOINT REPLACEMENT Bilateral     hips and knees   • HI COLONOSCOPY FLX DX W/COLLJ SPEC WHEN PFRMD N/A 1/9/2018    Procedure: COLONOSCOPY;  Surgeon: Aftab Chen MD;  Location: [facility]; Service: Gastroenterology   • TOTAL HIP ARTHROPLASTY Bilateral 2009, 2010   • TOTAL KNEE ARTHROPLASTY Bilateral 2014     Family History   Problem Relation Age of Onset   • Diabetes Mother    • Heart disease Father    • Coronary artery disease Father    • Aortic aneurysm Father         Ruptured abdominal aortic aneurysm   • Coronary artery disease Paternal Grandmother    • Coronary artery disease Paternal Grandfather        Smoking history: He reports that he quit smoking about 13 years ago. His smoking use included cigarettes. He started smoking about 59 years ago. He has a 80.00 pack-year smoking history.  He has quit using smokeless tobacco.    Occupational History:     Immunization History   Administered Date(s) Administered   • COVID-19 PFIZER VACCINE 0.3 ML IM 02/01/2021, 02/23/2021, 10/20/2021   • INFLUENZA 01/08/2004, 01/01/2005, 10/24/2005, 12/06/2006, 10/29/2007, 10/21/2008, 09/17/2009, 10/05/2010, 11/14/2011, 10/25/2012, 10/23/2013, 03/27/2014, 10/01/2018, 10/15/2018, 10/01/2021, 10/24/2022   • Influenza Quadrivalent Preservative Free 3 years and older IM 10/04/2016   • Influenza Split High Dose Preservative Free IM 10/04/2017   • Influenza, high dose seasonal 0.7 mL 09/16/2019, 10/23/2020   • Pneumococcal 10/19/2004   • Pneumococcal Conjugate 13-Valent 08/26/2016, 10/25/2019   • Pneumococcal Polysaccharide PPV23 10/17/2018   • Td (adult), Unspecified 08/05/2014       Meds/Allergies     Current Outpatient Medications:   •  famotidine (PEPCID) 40 MG tablet, Take 1 tablet (40 mg total) by mouth daily, Disp: 90 tablet, Rfl: 3  •  lisinopril (ZESTRIL) 10 mg tablet, Take 1 tablet (10 mg total) by mouth daily, Disp: 90 tablet, Rfl: 3  •  Multiple Vitamin (multivitamin) capsule, Take 1 capsule by mouth daily, Disp: , Rfl:   •  RESTASIS 0.05 % ophthalmic emulsion, INSTILL 1 DROP INTO BOTH EYES TWICE A DAY, Disp: , Rfl: 3  •  simvastatin (ZOCOR) 40 mg tablet, Take 1 tablet (40 mg total) by mouth daily at bedtime, Disp: 90 tablet, Rfl: 3  •  traZODone (DESYREL) 50 mg tablet, Take 50 mg by mouth daily at bedtime, Disp: , Rfl:   Allergies: Allergies   Allergen Reactions   • Morphine And Related GI Intolerance         Vitals:  Vitals:    08/09/23 0837   BP: 121/72   BP Location: Right arm   Patient Position: Sitting   Cuff Size: Large   Pulse: 70   Resp: 16   SpO2: 94%   Weight: 118 kg (260 lb)   Height: 6' (1.829 m)   Oxygen Therapy  SpO2: 94 %  Oxygen Therapy: None (Room air)  . Wt Readings from Last 3 Encounters:   08/09/23 118 kg (260 lb)   07/21/23 118 kg (260 lb)   05/17/23 120 kg (264 lb)     Body mass index is 35.26 kg/m². Physical Exam  Vitals and nursing note reviewed. Constitutional:       General: He is not in acute distress. Appearance: Normal appearance. He is well-developed. Cardiovascular:      Rate and Rhythm: Normal rate and regular rhythm. Heart sounds: Normal heart sounds. No murmur heard. Pulmonary:      Effort: Pulmonary effort is normal. No respiratory distress. Breath sounds: Normal breath sounds. No decreased breath sounds, wheezing, rhonchi or rales. Musculoskeletal:         General: No swelling. Right lower leg: No edema. Left lower leg: No edema. Psychiatric:         Mood and Affect: Mood and affect normal.         Behavior: Behavior normal. Behavior is cooperative. Labs: I have personally reviewed pertinent lab results.   Lab Results   Component Value Date    WBC 4.85 07/05/2023    HGB 15.2 07/05/2023    HCT 47.2 07/05/2023    MCV 96 07/05/2023     07/05/2023     Lab Results   Component Value Date    CALCIUM 9.0 07/05/2023     03/21/2017    K 4.0 07/05/2023    CO2 28 07/05/2023     07/05/2023    BUN 21 07/05/2023    CREATININE 1.20 07/05/2023     Lab Results   Component Value Date    IGE 49.4 07/06/2020     Lab Results   Component Value Date    ALT 29 07/05/2023    AST 16 07/05/2023 ALKPHOS 52 07/05/2023    BILITOT 1.0 03/21/2017       Imaging and other studies: I have personally reviewed pertinent reports and I have personally reviewed pertinent films in PACS     CT lung screening 7/27/2023  Lungs are clear. No nodules and/or definitely benign nodules. Pulmonary function testing:     Pulmonary Functions Testing Results: 7/06/2020    FEV1/FVC ratio 104%    FEV1 77% predicted  FVC 72% predicted  response to bronchodilators- not performed  TLC 74 % predicted  RV 85 % predicted  DLCO corrected for hemoglobin 67 % predicted    Impression: Spirometry demonstrates restrictive pattern with decreased FVC and FEV1 and a normal ratio. Lung volumes mildly decreased. DLCO mildly decreased. Flow volume curve demonstrates mild restrictive pattern.

## 2023-08-09 NOTE — ASSESSMENT & PLAN NOTE
Patient with at least 30-pack-year smoking history, quit 14 years ago. Reviewed recent CT lung cancer screening with patient today. Negative for nodules. Will order repeat CT lung screening to complete in 1 year.

## 2023-08-24 ENCOUNTER — TELEPHONE (OUTPATIENT)
Dept: CARDIOLOGY CLINIC | Facility: CLINIC | Age: 73
End: 2023-08-24

## 2023-08-24 NOTE — TELEPHONE ENCOUNTER
----- Message from Lula Smith sent at 8/23/2023  7:27 PM EDT -----  Regarding: Appt  Contact: [phone]  Do I have a follow up appointment any time soon   Lula Smith   1950

## 2023-12-06 DIAGNOSIS — I10 ESSENTIAL HYPERTENSION: ICD-10-CM

## 2023-12-07 DIAGNOSIS — I10 ESSENTIAL HYPERTENSION: ICD-10-CM

## 2023-12-07 RX ORDER — SIMVASTATIN 40 MG
40 TABLET ORAL
Qty: 90 TABLET | Refills: 3 | Status: SHIPPED | OUTPATIENT
Start: 2023-12-07

## 2023-12-07 RX ORDER — LISINOPRIL 10 MG/1
10 TABLET ORAL DAILY
Qty: 90 TABLET | Refills: 0 | Status: SHIPPED | OUTPATIENT
Start: 2023-12-07

## 2024-01-18 ENCOUNTER — RA CDI HCC (OUTPATIENT)
Dept: OTHER | Facility: HOSPITAL | Age: 74
End: 2024-01-18

## 2024-01-19 ENCOUNTER — OFFICE VISIT (OUTPATIENT)
Dept: FAMILY MEDICINE CLINIC | Facility: CLINIC | Age: 74
End: 2024-01-19
Payer: MEDICARE

## 2024-01-19 VITALS
TEMPERATURE: 95.8 F | OXYGEN SATURATION: 96 % | WEIGHT: 264 LBS | DIASTOLIC BLOOD PRESSURE: 76 MMHG | SYSTOLIC BLOOD PRESSURE: 132 MMHG | BODY MASS INDEX: 35.76 KG/M2 | HEART RATE: 62 BPM | HEIGHT: 72 IN

## 2024-01-19 DIAGNOSIS — N18.31 STAGE 3A CHRONIC KIDNEY DISEASE (HCC): ICD-10-CM

## 2024-01-19 DIAGNOSIS — I71.21 ANEURYSM OF ASCENDING AORTA WITHOUT RUPTURE (HCC): Primary | ICD-10-CM

## 2024-01-19 DIAGNOSIS — Z00.00 HEALTH CARE MAINTENANCE: ICD-10-CM

## 2024-01-19 PROBLEM — J41.0 SIMPLE CHRONIC BRONCHITIS (HCC): Status: RESOLVED | Noted: 2023-08-09 | Resolved: 2024-01-19

## 2024-01-19 PROBLEM — J44.9 CHRONIC OBSTRUCTIVE PULMONARY DISEASE (HCC): Status: RESOLVED | Noted: 2022-04-21 | Resolved: 2024-01-19

## 2024-01-19 PROBLEM — E66.01 OBESITY, MORBID (HCC): Status: RESOLVED | Noted: 2021-04-26 | Resolved: 2024-01-19

## 2024-01-19 PROCEDURE — G0439 PPPS, SUBSEQ VISIT: HCPCS | Performed by: FAMILY MEDICINE

## 2024-01-19 PROCEDURE — 99213 OFFICE O/P EST LOW 20 MIN: CPT | Performed by: FAMILY MEDICINE

## 2024-01-19 NOTE — PATIENT INSTRUCTIONS
Medicare Preventive Visit Patient Instructions  Thank you for completing your Welcome to Medicare Visit or Medicare Annual Wellness Visit today. Your next wellness visit will be due in one year (1/19/2025).  The screening/preventive services that you may require over the next 5-10 years are detailed below. Some tests may not apply to you based off risk factors and/or age. Screening tests ordered at today's visit but not completed yet may show as past due. Also, please note that scanned in results may not display below.  Preventive Screenings:  Service Recommendations Previous Testing/Comments   Colorectal Cancer Screening  Colonoscopy    Fecal Occult Blood Test (FOBT)/Fecal Immunochemical Test (FIT)  Fecal DNA/Cologuard Test  Flexible Sigmoidoscopy Age: 45-75 years old   Colonoscopy: every 10 years (May be performed more frequently if at higher risk)  OR  FOBT/FIT: every 1 year  OR  Cologuard: every 3 years  OR  Sigmoidoscopy: every 5 years  Screening may be recommended earlier than age 45 if at higher risk for colorectal cancer. Also, an individualized decision between you and your healthcare provider will decide whether screening between the ages of 76-85 would be appropriate. Colonoscopy: 02/14/2022  FOBT/FIT: Not on file  Cologuard: Not on file  Sigmoidoscopy: Not on file    Screening Current     Prostate Cancer Screening Individualized decision between patient and health care provider in men between ages of 55-69   Medicare will cover every 12 months beginning on the day after your 50th birthday PSA: 0.51 ng/mL     Screening Current     Hepatitis C Screening Once for adults born between 1945 and 1965  More frequently in patients at high risk for Hepatitis C Hep C Antibody: Not on file        Diabetes Screening 1-2 times per year if you're at risk for diabetes or have pre-diabetes Fasting glucose: 118 mg/dL (7/5/2023)  A1C: 5.6 % (3/3/2021)  Screening Current   Cholesterol Screening Once every 5 years if you  don't have a lipid disorder. May order more often based on risk factors. Lipid panel: 07/05/2023  Screening Not Indicated  History Lipid Disorder      Other Preventive Screenings Covered by Medicare:  Abdominal Aortic Aneurysm (AAA) Screening: covered once if your at risk. You're considered to be at risk if you have a family history of AAA or a male between the age of 65-75 who smoking at least 100 cigarettes in your lifetime.  Lung Cancer Screening: covers low dose CT scan once per year if you meet all of the following conditions: (1) Age 55-77; (2) No signs or symptoms of lung cancer; (3) Current smoker or have quit smoking within the last 15 years; (4) You have a tobacco smoking history of at least 20 pack years (packs per day x number of years you smoked); (5) You get a written order from a healthcare provider.  Glaucoma Screening: covered annually if you're considered high risk: (1) You have diabetes OR (2) Family history of glaucoma OR (3)  aged 50 and older OR (4)  American aged 65 and older  Osteoporosis Screening: covered every 2 years if you meet one of the following conditions: (1) Have a vertebral abnormality; (2) On glucocorticoid therapy for more than 3 months; (3) Have primary hyperparathyroidism; (4) On osteoporosis medications and need to assess response to drug therapy.  HIV Screening: covered annually if you're between the age of 15-65. Also covered annually if you are younger than 15 and older than 65 with risk factors for HIV infection. For pregnant patients, it is covered up to 3 times per pregnancy.    Immunizations:  Immunization Recommendations   Influenza Vaccine Annual influenza vaccination during flu season is recommended for all persons aged >= 6 months who do not have contraindications   Pneumococcal Vaccine   * Pneumococcal conjugate vaccine = PCV13 (Prevnar 13), PCV15 (Vaxneuvance), PCV20 (Prevnar 20)  * Pneumococcal polysaccharide vaccine = PPSV23 (Pneumovax)  Adults 19-63 yo with certain risk factors or if 65+ yo  If never received any pneumonia vaccine: recommend Prevnar 20 (PCV20)  Give PCV20 if previously received 1 dose of PCV13 or PPSV23   Hepatitis B Vaccine 3 dose series if at intermediate or high risk (ex: diabetes, end stage renal disease, liver disease)   Respiratory syncytial virus (RSV) Vaccine - COVERED BY MEDICARE PART D  * RSVPreF3 (Arexvy) CDC recommends that adults 60 years of age and older may receive a single dose of RSV vaccine using shared clinical decision-making (SCDM)   Tetanus (Td) Vaccine - COST NOT COVERED BY MEDICARE PART B Following completion of primary series, a booster dose should be given every 10 years to maintain immunity against tetanus. Td may also be given as tetanus wound prophylaxis.   Tdap Vaccine - COST NOT COVERED BY MEDICARE PART B Recommended at least once for all adults. For pregnant patients, recommended with each pregnancy.   Shingles Vaccine (Shingrix) - COST NOT COVERED BY MEDICARE PART B  2 shot series recommended in those 19 years and older who have or will have weakened immune systems or those 50 years and older     Health Maintenance Due:      Topic Date Due   • Colorectal Cancer Screening  02/14/2027   • Hepatitis C Screening  Discontinued   • Lung Cancer Screening  Discontinued     Immunizations Due:      Topic Date Due   • Hepatitis A Vaccine (1 of 2 - Risk 2-dose series) Never done   • COVID-19 Vaccine (4 - 2023-24 season) 09/01/2023     Advance Directives   What are advance directives?  Advance directives are legal documents that state your wishes and plans for medical care. These plans are made ahead of time in case you lose your ability to make decisions for yourself. Advance directives can apply to any medical decision, such as the treatments you want, and if you want to donate organs.   What are the types of advance directives?  There are many types of advance directives, and each state has rules about how to  use them. You may choose a combination of any of the following:  Living will:  This is a written record of the treatment you want. You can also choose which treatments you do not want, which to limit, and which to stop at a certain time. This includes surgery, medicine, IV fluid, and tube feedings.   Durable power of  for healthcare (DPAHC):  This is a written record that states who you want to make healthcare choices for you when you are unable to make them for yourself. This person, called a proxy, is usually a family member or a friend. You may choose more than 1 proxy.  Do not resuscitate (DNR) order:  A DNR order is used in case your heart stops beating or you stop breathing. It is a request not to have certain forms of treatment, such as CPR. A DNR order may be included in other types of advance directives.  Medical directive:  This covers the care that you want if you are in a coma, near death, or unable to make decisions for yourself. You can list the treatments you want for each condition. Treatment may include pain medicine, surgery, blood transfusions, dialysis, IV or tube feedings, and a ventilator (breathing machine).  Values history:  This document has questions about your views, beliefs, and how you feel and think about life. This information can help others choose the care that you would choose.  Why are advance directives important?  An advance directive helps you control your care. Although spoken wishes may be used, it is better to have your wishes written down. Spoken wishes can be misunderstood, or not followed. Treatments may be given even if you do not want them. An advance directive may make it easier for your family to make difficult choices about your care.   Weight Management   Why it is important to manage your weight:  Being overweight increases your risk of health conditions such as heart disease, high blood pressure, type 2 diabetes, and certain types of cancer. It can also  increase your risk for osteoarthritis, sleep apnea, and other respiratory problems. Aim for a slow, steady weight loss. Even a small amount of weight loss can lower your risk of health problems.  How to lose weight safely:  A safe and healthy way to lose weight is to eat fewer calories and get regular exercise. You can lose up about 1 pound a week by decreasing the number of calories you eat by 500 calories each day.   Healthy meal plan for weight management:  A healthy meal plan includes a variety of foods, contains fewer calories, and helps you stay healthy. A healthy meal plan includes the following:  Eat whole-grain foods more often.  A healthy meal plan should contain fiber. Fiber is the part of grains, fruits, and vegetables that is not broken down by your body. Whole-grain foods are healthy and provide extra fiber in your diet. Some examples of whole-grain foods are whole-wheat breads and pastas, oatmeal, brown rice, and bulgur.  Eat a variety of vegetables every day.  Include dark, leafy greens such as spinach, kale, sina greens, and mustard greens. Eat yellow and orange vegetables such as carrots, sweet potatoes, and winter squash.   Eat a variety of fruits every day.  Choose fresh or canned fruit (canned in its own juice or light syrup) instead of juice. Fruit juice has very little or no fiber.  Eat low-fat dairy foods.  Drink fat-free (skim) milk or 1% milk. Eat fat-free yogurt and low-fat cottage cheese. Try low-fat cheeses such as mozzarella and other reduced-fat cheeses.  Choose meat and other protein foods that are low in fat.  Choose beans or other legumes such as split peas or lentils. Choose fish, skinless poultry (chicken or turkey), or lean cuts of red meat (beef or pork). Before you cook meat or poultry, cut off any visible fat.   Use less fat and oil.  Try baking foods instead of frying them. Add less fat, such as margarine, sour cream, regular salad dressing and mayonnaise to foods. Eat  fewer high-fat foods. Some examples of high-fat foods include french fries, doughnuts, ice cream, and cakes.  Eat fewer sweets.  Limit foods and drinks that are high in sugar. This includes candy, cookies, regular soda, and sweetened drinks.  Exercise:  Exercise at least 30 minutes per day on most days of the week. Some examples of exercise include walking, biking, dancing, and swimming. You can also fit in more physical activity by taking the stairs instead of the elevator or parking farther away from stores. Ask your healthcare provider about the best exercise plan for you.      © Copyright [employer] 2018 Information is for End User's use only and may not be sold, redistributed or otherwise used for commercial purposes. All illustrations and images included in CareNotes® are the copyrighted property of [employer] or [employer]

## 2024-01-19 NOTE — PROGRESS NOTES
Assessment and Plan:     Problem List Items Addressed This Visit     BMI 35.0-35.9,adult    Aneurysm of ascending aorta without rupture (HCC) - Primary     He is current with his screening CAT scans, he is due for this in July.  It is already ordered from his pulmonologist who follows his lung nodules.         Stage 3a chronic kidney disease (HCC)     Lab Results   Component Value Date    EGFR 59 07/05/2023    EGFR 66 10/26/2022    EGFR 67 04/08/2022    CREATININE 1.20 07/05/2023    CREATININE 1.10 10/26/2022    CREATININE 1.09 04/08/2022   [facility], [facility] on a [facility] basis.        Other Visit Diagnoses     Health care maintenance                 Preventive health issues were discussed with patient, and age appropriate screening tests were ordered as noted in patient's After Visit Summary.  Personalized health advice and appropriate referrals for health education or preventive services given if needed, as noted in patient's After Visit Summary.     History of Present Illness:     Patient presents for a Medicare Wellness Visit    Patient comes in today for checkup, states he is doing well.  [facility] however he forgot to bring it with him today.  He states that his hemoglobin A1c he does remember was 5.6.       Patient Care Team:  Hardy Doshi DO as PCP - General  Dakota Colmenares MD as Endoscopist     Review of Systems:     Review of Systems   Constitutional:  Negative for chills, fatigue and fever.   HENT:  Negative for congestion, ear pain, hearing loss, postnasal drip, rhinorrhea and sore throat.    Eyes:  Negative for pain and visual disturbance.   Respiratory:  Negative for chest tightness, shortness of breath and wheezing.    Cardiovascular:  Negative for chest pain and leg swelling.   Gastrointestinal:  Negative for abdominal distention, abdominal pain, constipation, diarrhea and vomiting.   Endocrine: Negative for cold  intolerance and heat intolerance.   Genitourinary:  Negative for difficulty urinating, frequency and urgency.   Musculoskeletal:  Negative for arthralgias and gait problem.   Skin:  Negative for color change.   Neurological:  Negative for dizziness, tremors, syncope, numbness and headaches.   Hematological:  Negative for adenopathy.   Psychiatric/Behavioral:  Negative for agitation, confusion and sleep disturbance. The patient is not nervous/anxious.         Problem List:     Patient Active Problem List   Diagnosis   • Post traumatic stress disorder   • Mixed hyperlipidemia   • Essential hypertension   • Elevated blood sugar   • Seborrheic keratosis   • Tension headache   • Cervicalgia   • Stenosis of left carotid artery   • BPH with obstruction/lower urinary tract symptoms   • SOB (shortness of breath)   • Gastroesophageal reflux disease without esophagitis   • BMI 35.0-35.9,adult   • Erectile dysfunction due to arterial insufficiency   • Aneurysm of ascending aorta without rupture ([facility])   • Stage 3a chronic kidney disease ([facility])   • Former smoker      Past Medical and Surgical History:     Past Medical History:   Diagnosis Date   • Angina pectoris ([facility])    • Aortic aneurysm ([facility])     under observation   • Cardiac disease    • COPD (chronic obstructive pulmonary disease) ([facility])    • Coronary artery disease    • DVT (deep venous thrombosis) ([facility])     2015   • Fungal dermatitis    • GERD (gastroesophageal reflux disease)    • Hyperlipidemia    • Hypertension     per [employer]: Essential hypertension ith goal blood pressure less than 130/85; Resolved:4/20/17   • MRSA (methicillin resistant Staphylococcus aureus) infection    • Pneumonia 1953   • Post traumatic stress disorder (PTSD)    • Psychiatric disorder      Past Surgical History:   Procedure Laterality Date   • CATARACT EXTRACTION     • CHOLECYSTECTOMY     • CHOLECYSTECTOMY LAPAROSCOPIC N/A 3/7/2016    Procedure: CHOLECYSTECTOMY LAPAROSCOPIC;  Surgeon: Conrad  DO Efren;  Location: [facility];  Service:    • COLONOSCOPY     • HERNIA REPAIR N/A unknown, supraumbilical   • HERNIA REPAIR Left 2022    Procedure: EXCISION OF CORD LIPOMA , GROIN EXPLORATION;  Surgeon: Horacio Phillip MD;  Location: [facility];  Service: General   • JOINT REPLACEMENT Bilateral     hips and knees   • SC COLONOSCOPY FLX DX W/COLLJ SPEC WHEN PFRMD N/A 2018    Procedure: COLONOSCOPY;  Surgeon: Dakota Colmenares MD;  Location: [facility];  Service: Gastroenterology   • TOTAL HIP ARTHROPLASTY Bilateral ,    • TOTAL KNEE ARTHROPLASTY Bilateral       Family History:     Family History   Problem Relation Age of Onset   • Diabetes Mother    • Heart disease Father    • Coronary artery disease Father    • Aortic aneurysm Father         Ruptured abdominal aortic aneurysm   • Coronary artery disease Paternal Grandmother    • Coronary artery disease Paternal Grandfather       Social History:     Social History     Socioeconomic History   • Marital status: /Civil Union     Spouse name: None   • Number of children: None   • Years of education: None   • Highest education level: None   Occupational History   • Occupation: Retired   Tobacco Use   • Smoking status: Former     Current packs/day: 0.00     Average packs/day: 2.0 packs/day for 46.0 years (92.0 ttl pk-yrs)     Types: Cigarettes     Start date:      Quit date:      Years since quittin.0   • Smokeless tobacco: Former   Vaping Use   • Vaping status: Never Used   Substance and Sexual Activity   • Alcohol use: Not Currently     Alcohol/week: 10.0 standard drinks of alcohol     Types: 10 Cans of beer per week   • Drug use: Never   • Sexual activity: Not Currently     Partners: Female     Birth control/protection: Other   Other Topics Concern   • None   Social History Narrative    Daily caffeine consumption     Social Determinants of Health     Financial Resource Strain: Low Risk  (2024)    Overall Financial  Resource Strain (CARDIA)    • Difficulty of Paying Living Expenses: Not hard at all   Food Insecurity: Not on file   Transportation Needs: No Transportation Needs (1/19/2024)    PRAPARE - Transportation    • Lack of Transportation (Medical): No    • Lack of Transportation (Non-Medical): No   Physical Activity: Not on file   Stress: Not on file   Social Connections: Not on file   Intimate Partner Violence: Not on file   Housing Stability: Not on file      Medications and Allergies:     Current Outpatient Medications   Medication Sig Dispense Refill   • lisinopril (ZESTRIL) 10 mg tablet Take 1 tablet (10 mg total) by mouth daily 90 tablet 0   • Multiple Vitamin (multivitamin) capsule Take 1 capsule by mouth daily     • RESTASIS 0.05 % ophthalmic emulsion INSTILL 1 DROP INTO BOTH EYES TWICE A DAY  3   • simvastatin (ZOCOR) 40 mg tablet Take 1 tablet (40 mg total) by mouth daily at bedtime 90 tablet 3   • traZODone (DESYREL) 50 mg tablet Take 50 mg by mouth daily at bedtime       No current facility-administered medications for this visit.     Allergies   Allergen Reactions   • Morphine And Related GI Intolerance      Immunizations:     Immunization History   Administered Date(s) Administered   • COVID-19 PFIZER VACCINE 0.3 ML IM 02/01/2021, 02/23/2021, 10/20/2021   • INFLUENZA 01/08/2004, 01/01/2005, 10/24/2005, 12/06/2006, 10/29/2007, 10/21/2008, 09/17/2009, 10/05/2010, 11/14/2011, 10/25/2012, 10/23/2013, 03/27/2014, 10/01/2018, 10/15/2018, 10/01/2021, 10/24/2022   • Influenza Quadrivalent Preservative Free 3 years and older IM 10/04/2016   • Influenza Split High Dose Preservative Free IM 10/04/2017   • Influenza, high dose seasonal 0.7 mL 09/16/2019, 10/23/2020   • Pneumococcal 10/19/2004   • Pneumococcal Conjugate 13-Valent 08/26/2016, 10/25/2019   • Pneumococcal Polysaccharide PPV23 10/17/2018   • Td (adult), Unspecified 08/05/2014      Health Maintenance:         Topic Date Due   • Colorectal Cancer Screening   02/14/2027   • Hepatitis C Screening  Discontinued   • Lung Cancer Screening  Discontinued         Topic Date Due   • Hepatitis A Vaccine (1 of 2 - Risk 2-dose series) Never done   • COVID-19 Vaccine (4 - 2023-24 season) 09/01/2023      Medicare Screening Tests and Risk Assessments:     Artur is here for his Subsequent Wellness visit. Last Medicare Wellness visit information reviewed, patient interviewed and updates made to the record today.      Health Risk Assessment:   Patient rates overall health as good. Patient feels that their physical health rating is same. Patient is satisfied with their life. Eyesight was rated as same. Hearing was rated as same. Patient feels that their emotional and mental health rating is same. Patients states they are sometimes angry. Patient states they are sometimes unusually tired/fatigued. Pain experienced in the last 7 days has been none. Patient states that he has experienced no weight loss or gain in last 6 months.     Depression Screening:   PHQ-2 Score: 1      Fall Risk Screening:   In the past year, patient has experienced: no history of falling in past year      Home Safety:  Patient does not have trouble with stairs inside or outside of their home. Patient has working smoke alarms and has working carbon monoxide detector. Home safety hazards include: none.     Nutrition:   Current diet is Regular.     Medications:   Patient is not currently taking any over-the-counter supplements. Patient is able to manage medications.     Activities of Daily Living (ADLs)/Instrumental Activities of Daily Living (IADLs):   Walk and transfer into and out of bed and chair?: Yes  Dress and groom yourself?: Yes    Bathe or shower yourself?: Yes    Feed yourself? Yes  Do your laundry/housekeeping?: Yes  Manage your money, pay your bills and track your expenses?: Yes  Make your own meals?: Yes    Do your own shopping?: Yes    Previous Hospitalizations:   Any hospitalizations or ED visits within  the last 12 months?: No      Advance Care Planning:   Living will: No    Durable POA for healthcare: No    Advanced directive: No    Advanced directive counseling given: Yes      Cognitive Screening:   Provider or family/friend/caregiver concerned regarding cognition?: No    PREVENTIVE SCREENINGS      Cardiovascular Screening:    General: Screening Not Indicated and History Lipid Disorder      Diabetes Screening:     General: Screening Current      Colorectal Cancer Screening:     General: Screening Current      Prostate Cancer Screening:    General: Screening Current      Osteoporosis Screening:    General: Screening Not Indicated      Abdominal Aortic Aneurysm (AAA) Screening:    Risk factors include: age between 65-76 yo and tobacco use        General: History AAA      Lung Cancer Screening:     General: Screening Current      Hepatitis C Screening:    General: Screening Not Indicated      Preventive Screening Comments: [facility], he is up-to-date with his follow-up for his aneurysm.    Screening, Brief Intervention, and Referral to Treatment (SBIRT)    Screening  Typical number of drinks in a day: 0  Typical number of drinks in a week: 0  Interpretation: Low risk drinking behavior.    Single Item Drug Screening:  How often have you used an illegal drug (including marijuana) or a prescription medication for non-medical reasons in the past year? never    Single Item Drug Screen Score: 0  Interpretation: Negative screen for possible drug use disorder    Other Counseling Topics:   Car/seat belt/driving safety.     No results found.     Physical Exam:     /76 (BP Location: Left arm, Patient Position: Sitting, Cuff Size: Large)   Pulse 62   Temp (!) 95.8 °F (35.4 °C) (Tympanic)   Ht 6' (1.829 m)   Wt 120 kg (264 lb)   SpO2 96%   BMI 35.80 kg/m²     Physical Exam  Constitutional:       Appearance: He is well-developed.   HENT:      Head: Normocephalic.      Right Ear:  External ear normal.      Left Ear: External ear normal.      Nose: Nose normal.   Eyes:      Extraocular Movements: Extraocular movements intact.      Conjunctiva/sclera: Conjunctivae normal.      Pupils: Pupils are equal, round, and reactive to light.   Neck:      Thyroid: No thyromegaly.   Cardiovascular:      Rate and Rhythm: Normal rate and regular rhythm.      Heart sounds: Normal heart sounds.   Pulmonary:      Effort: Pulmonary effort is normal.      Breath sounds: Normal breath sounds.   Abdominal:      General: Bowel sounds are normal.      Palpations: Abdomen is soft.   Musculoskeletal:         General: Normal range of motion.      Cervical back: Normal range of motion.   Skin:     General: Skin is warm and dry.   Neurological:      Mental Status: He is alert and oriented to person, place, and time.   Psychiatric:         Mood and Affect: Mood normal.         Behavior: Behavior normal.          Hardy Doshi DO

## 2024-01-19 NOTE — ASSESSMENT & PLAN NOTE
He is current with his screening CAT scans, he is due for this in July.  It is already ordered from his pulmonologist who follows his lung nodules.

## 2024-01-19 NOTE — ASSESSMENT & PLAN NOTE
Lab Results   Component Value Date    EGFR 59 07/05/2023    EGFR 66 10/26/2022    EGFR 67 04/08/2022    CREATININE 1.20 07/05/2023    CREATININE 1.10 10/26/2022    CREATININE 1.09 04/08/2022   [facility], this is [facility] on a yearly basis.

## 2024-02-07 ENCOUNTER — OFFICE VISIT (OUTPATIENT)
Dept: CARDIOLOGY CLINIC | Facility: CLINIC | Age: 74
End: 2024-02-07
Payer: MEDICARE

## 2024-02-07 VITALS
HEART RATE: 67 BPM | OXYGEN SATURATION: 94 % | BODY MASS INDEX: 36.3 KG/M2 | SYSTOLIC BLOOD PRESSURE: 129 MMHG | RESPIRATION RATE: 21 BRPM | DIASTOLIC BLOOD PRESSURE: 70 MMHG | WEIGHT: 268 LBS | HEIGHT: 72 IN

## 2024-02-07 DIAGNOSIS — I10 ESSENTIAL HYPERTENSION: ICD-10-CM

## 2024-02-07 DIAGNOSIS — I71.21 ANEURYSM OF ASCENDING AORTA WITHOUT RUPTURE (HCC): Primary | ICD-10-CM

## 2024-02-07 PROCEDURE — 99214 OFFICE O/P EST MOD 30 MIN: CPT | Performed by: INTERNAL MEDICINE

## 2024-02-16 DIAGNOSIS — I10 ESSENTIAL HYPERTENSION: ICD-10-CM

## 2024-02-16 RX ORDER — LISINOPRIL 10 MG/1
10 TABLET ORAL DAILY
Qty: 90 TABLET | Refills: 0 | Status: SHIPPED | OUTPATIENT
Start: 2024-02-16

## 2024-02-16 RX ORDER — SIMVASTATIN 40 MG
40 TABLET ORAL
Qty: 90 TABLET | Refills: 3 | Status: SHIPPED | OUTPATIENT
Start: 2024-02-16

## 2024-03-11 ENCOUNTER — HOSPITAL ENCOUNTER (OUTPATIENT)
Dept: ULTRASOUND IMAGING | Facility: HOSPITAL | Age: 74
Discharge: HOME | End: 2024-03-11
Attending: FAMILY MEDICINE
Payer: MEDICARE

## 2024-03-11 ENCOUNTER — OFFICE VISIT (OUTPATIENT)
Dept: FAMILY MEDICINE CLINIC | Facility: CLINIC | Age: 74
End: 2024-03-11
Payer: MEDICARE

## 2024-03-11 VITALS
SYSTOLIC BLOOD PRESSURE: 116 MMHG | HEART RATE: 65 BPM | OXYGEN SATURATION: 97 % | WEIGHT: 270 LBS | HEIGHT: 72 IN | DIASTOLIC BLOOD PRESSURE: 76 MMHG | TEMPERATURE: 96.9 F | BODY MASS INDEX: 36.57 KG/M2

## 2024-03-11 DIAGNOSIS — N50.812 LEFT TESTICULAR PAIN: Primary | ICD-10-CM

## 2024-03-11 DIAGNOSIS — R10.32 LEFT GROIN PAIN: ICD-10-CM

## 2024-03-11 DIAGNOSIS — N50.812 LEFT TESTICULAR PAIN: ICD-10-CM

## 2024-03-11 PROCEDURE — 76870 US EXAM SCROTUM: CPT

## 2024-03-11 PROCEDURE — G2211 COMPLEX E/M VISIT ADD ON: HCPCS | Performed by: FAMILY MEDICINE

## 2024-03-11 PROCEDURE — 99214 OFFICE O/P EST MOD 30 MIN: CPT | Performed by: FAMILY MEDICINE

## 2024-03-11 NOTE — PROGRESS NOTES
Assessment/Plan:    No problem-specific Assessment & Plan notes found for this encounter.     Diagnoses and all orders for this visit:    Left testicular pain  -     US scrotum and groin area; Future    Left groin pain  -     US scrotum and groin area; Future        Subjective:      Patient ID: Artur Del Toro Jr. is a 73 y.o. male.    HPI    Patient states that he has had left sided groin and testicular pain. Denies swelling, redness or tenderness to touch. Reports that this has been going on for about 2 months. States that this had been worse the last few days but today there is no pain. Denies any urinary symptoms. States that he had BPH and has difficulty starting at times but there has been no changes.     Was driving back from [place] over the weekend. States that since being back, the pain has improved.     History of left sided groin surgery with lipoma removal about 2 years ago.     The following portions of the patient's history were reviewed and updated as appropriate: allergies, current medications, past family history, past medical history, past social history, past surgical history, and problem list.    Review of Systems      Objective:  /76 (BP Location: Left arm, Patient Position: Sitting, Cuff Size: Large)   Pulse 65   Temp (!) 96.9 °F (36.1 °C) (Tympanic)   Ht 6' (1.829 m)   Wt 122 kg (270 lb)   SpO2 97%   BMI 36.62 kg/m²      Physical Exam  Vitals reviewed.   Constitutional:       General: He is not in acute distress.     Appearance: Normal appearance. He is not ill-appearing.   HENT:      Head: Normocephalic and atraumatic.      Right Ear: External ear normal.      Left Ear: External ear normal.      Nose: Nose normal.      Mouth/Throat:      Mouth: Mucous membranes are moist.   Eyes:      Extraocular Movements: Extraocular movements intact.      Conjunctiva/sclera: Conjunctivae normal.   Pulmonary:      Effort: Pulmonary effort is normal. No respiratory distress.      Breath sounds: No  wheezing.   Abdominal:      General: Bowel sounds are normal.      Palpations: Abdomen is soft.   Genitourinary:     Testes:         Right: Mass, tenderness or swelling not present.         Left: Testicular hydrocele present. Mass, tenderness or swelling not present.      Epididymis:      Right: Normal.      Left: Normal.      Comments: Fullness of the left inguinal canal.   Musculoskeletal:      Right lower leg: No edema.      Left lower leg: No edema.   Lymphadenopathy:      Lower Body: No right inguinal adenopathy. No left inguinal adenopathy.   Skin:     General: Skin is warm.   Neurological:      General: No focal deficit present.      Mental Status: He is alert. Mental status is at baseline.        DO [facility]  3/11/2024 1:27 PM

## 2024-03-19 ENCOUNTER — TELEPHONE (OUTPATIENT)
Dept: FAMILY MEDICINE CLINIC | Facility: CLINIC | Age: 74
End: 2024-03-19

## 2024-03-19 NOTE — TELEPHONE ENCOUNTER
T/c from pt -- inquiring about his US -- Pt given Dr Chamorro's advisement   Fatty lesion on the right remains, unchanged. Left testicle normal.   Pt noted that he still has pain and ended call.

## 2024-05-10 DIAGNOSIS — I10 ESSENTIAL HYPERTENSION: ICD-10-CM

## 2024-05-10 RX ORDER — LISINOPRIL 10 MG/1
10 TABLET ORAL DAILY
Qty: 90 TABLET | Refills: 1 | Status: SHIPPED | OUTPATIENT
Start: 2024-05-10

## 2024-07-01 ENCOUNTER — HOSPITAL ENCOUNTER (OUTPATIENT)
Dept: CT IMAGING | Facility: HOSPITAL | Age: 74
Discharge: HOME | End: 2024-07-01
Payer: MEDICARE

## 2024-07-01 DIAGNOSIS — Z87.891 FORMER SMOKER: ICD-10-CM

## 2024-07-01 PROCEDURE — 71271 CT THORAX LUNG CANCER SCR C-: CPT

## 2024-07-19 ENCOUNTER — OFFICE VISIT (OUTPATIENT)
Dept: FAMILY MEDICINE CLINIC | Facility: CLINIC | Age: 74
End: 2024-07-19
Payer: MEDICARE

## 2024-07-19 VITALS
WEIGHT: 236 LBS | DIASTOLIC BLOOD PRESSURE: 82 MMHG | SYSTOLIC BLOOD PRESSURE: 138 MMHG | TEMPERATURE: 97.7 F | HEIGHT: 72 IN | HEART RATE: 62 BPM | BODY MASS INDEX: 31.97 KG/M2 | OXYGEN SATURATION: 97 %

## 2024-07-19 DIAGNOSIS — N18.31 STAGE 3A CHRONIC KIDNEY DISEASE (HCC): ICD-10-CM

## 2024-07-19 DIAGNOSIS — Z12.5 PROSTATE CANCER SCREENING: Primary | ICD-10-CM

## 2024-07-19 DIAGNOSIS — E78.2 MIXED HYPERLIPIDEMIA: ICD-10-CM

## 2024-07-19 DIAGNOSIS — N52.01 ERECTILE DYSFUNCTION DUE TO ARTERIAL INSUFFICIENCY: ICD-10-CM

## 2024-07-19 DIAGNOSIS — I10 ESSENTIAL HYPERTENSION: ICD-10-CM

## 2024-07-19 DIAGNOSIS — I71.21 ANEURYSM OF ASCENDING AORTA WITHOUT RUPTURE (HCC): ICD-10-CM

## 2024-07-19 PROCEDURE — 99214 OFFICE O/P EST MOD 30 MIN: CPT | Performed by: FAMILY MEDICINE

## 2024-07-19 PROCEDURE — G2211 COMPLEX E/M VISIT ADD ON: HCPCS | Performed by: FAMILY MEDICINE

## 2024-07-19 RX ORDER — TADALAFIL 20 MG/1
20 TABLET ORAL DAILY PRN
Qty: 20 TABLET | Refills: 0 | Status: SHIPPED | OUTPATIENT
Start: 2024-07-19

## 2024-07-19 NOTE — ASSESSMENT & PLAN NOTE
Orders:    tadalafil (CIALIS) 20 MG tablet; Take 1 tablet (20 mg total) by mouth daily as needed for erectile dysfunction

## 2024-07-19 NOTE — ASSESSMENT & PLAN NOTE
Lab Results   Component Value Date    EGFR 59 07/05/2023    EGFR 66 10/26/2022    EGFR 67 04/08/2022    CREATININE 1.20 07/05/2023    CREATININE 1.10 10/26/2022    CREATININE 1.09 04/08/2022     Stable, will continue to monitor with his routine blood work

## 2024-07-19 NOTE — PROGRESS NOTES
Ambulatory Visit  Name: Artur Del Toro Jr.      : 1950      MRN: [id number]  Encounter Provider: Hardy Doshi DO  Encounter Date: 2024   Encounter department: [facility] [address]      Assessment & Plan  Prostate cancer screening    Orders:    PSA, Total Screen; Future    Essential hypertension  Controlled, continue Lisinopril       Stage 3a chronic kidney disease (HCC)  Lab Results   Component Value Date    EGFR 59 2023    EGFR 66 10/26/2022    EGFR 67 2022    CREATININE 1.20 2023    CREATININE 1.10 10/26/2022    CREATININE 1.09 2022     Stable, will continue to monitor with his routine blood work       Mixed hyperlipidemia  Controlled, continue simvastatin, [facility]       Erectile dysfunction due to arterial insufficiency    Orders:    tadalafil (CIALIS) 20 MG tablet; Take 1 tablet (20 mg total) by mouth daily as needed for erectile dysfunction    Aneurysm of ascending aorta without rupture (HCC)  Current with CT         Depression Screening and Follow-up Plan: Patient was screened for depression during today's encounter. They screened negative with a PHQ-2 score of 0.        History of Present Illness     Patient comes in for a check-up, doing well, no complaints.  [facility] and has them with him today.      Review of Systems   Constitutional:  Negative for chills, fatigue and fever.   HENT:  Negative for congestion, ear pain, hearing loss, postnasal drip, rhinorrhea and sore throat.    Eyes:  Negative for pain and visual disturbance.   Respiratory:  Negative for chest tightness, shortness of breath and wheezing.    Cardiovascular:  Negative for chest pain and leg swelling.   Gastrointestinal:  Negative for abdominal distention, abdominal pain, constipation, diarrhea and vomiting.   Endocrine: Negative for cold intolerance and heat intolerance.   Genitourinary:  Negative for  difficulty urinating, frequency and urgency.   Musculoskeletal:  Negative for arthralgias and gait problem.   Skin:  Negative for color change.   Neurological:  Negative for dizziness, tremors, syncope, numbness and headaches.   Hematological:  Negative for adenopathy.   Psychiatric/Behavioral:  Negative for agitation, confusion and sleep disturbance. The patient is not nervous/anxious.      Objective     /82   Pulse 62   Temp 97.7 °F (36.5 °C)   Ht 6' (1.829 m)   Wt 107 kg (236 lb)   SpO2 97%   BMI 32.01 kg/m²     Physical Exam  Constitutional:       Appearance: He is well-developed.   HENT:      Head: Normocephalic.      Right Ear: External ear normal.      Left Ear: External ear normal.      Nose: Nose normal.   Eyes:      Extraocular Movements: Extraocular movements intact.      Conjunctiva/sclera: Conjunctivae normal.      Pupils: Pupils are equal, round, and reactive to light.   Neck:      Thyroid: No thyromegaly.   Cardiovascular:      Rate and Rhythm: Normal rate and regular rhythm.      Heart sounds: Normal heart sounds.   Pulmonary:      Effort: Pulmonary effort is normal.      Breath sounds: Normal breath sounds.   Abdominal:      General: Bowel sounds are normal.      Palpations: Abdomen is soft.   Musculoskeletal:         General: Normal range of motion.      Cervical back: Normal range of motion.   Skin:     General: Skin is warm and dry.   Neurological:      Mental Status: He is alert and oriented to person, place, and time.   Psychiatric:         Mood and Affect: Mood normal.         Behavior: Behavior normal.         Hardy Doshi DO

## 2024-07-30 ENCOUNTER — APPOINTMENT (OUTPATIENT)
Dept: LAB | Facility: CLINIC | Age: 74
End: 2024-07-30
Payer: MEDICARE

## 2024-07-30 DIAGNOSIS — Z12.5 PROSTATE CANCER SCREENING: ICD-10-CM

## 2024-07-30 LAB — PSA SERPL-MCNC: 0.79 NG/ML (ref 0–4)

## 2024-07-30 PROCEDURE — 36415 COLL VENOUS BLD VENIPUNCTURE: CPT

## 2024-07-30 PROCEDURE — G0103 PSA SCREENING: HCPCS

## 2024-08-01 ENCOUNTER — OFFICE VISIT (OUTPATIENT)
Age: 74
End: 2024-08-01
Payer: MEDICARE

## 2024-08-01 VITALS
SYSTOLIC BLOOD PRESSURE: 132 MMHG | WEIGHT: 260 LBS | OXYGEN SATURATION: 95 % | DIASTOLIC BLOOD PRESSURE: 74 MMHG | RESPIRATION RATE: 16 BRPM | BODY MASS INDEX: 35.21 KG/M2 | TEMPERATURE: 98.2 F | HEIGHT: 72 IN | HEART RATE: 64 BPM

## 2024-08-01 DIAGNOSIS — J41.0 SIMPLE CHRONIC BRONCHITIS (HCC): Primary | ICD-10-CM

## 2024-08-01 DIAGNOSIS — F17.211 CIGARETTE NICOTINE DEPENDENCE IN REMISSION: ICD-10-CM

## 2024-08-01 PROCEDURE — 99214 OFFICE O/P EST MOD 30 MIN: CPT | Performed by: INTERNAL MEDICINE

## 2024-08-01 NOTE — PROGRESS NOTES
"Assessment/Plan:   Diagnoses and all orders for this visit:    Simple chronic bronchitis (HCC)    Cigarette nicotine dependence in remission  -     CT lung screening program; Future        Spirometry with restrictive airflow limitation in 2020, with mildly decreased DLCO at 67%  CT lung screening 7/1/2024 reviewed with no evidence of any suspicious lung nodules  Would need an another year would be likely his last CT lung screening given it would be 15 years next year since he quit smoking  Will follow-up with repeat CT lung screening currently not on any bronchodilators  Vaccinations up-to-date    Return in about 1 year (around 8/1/2025).  All questions are answered to the patient's satisfaction and understanding.  He verbalizes understanding.  He is encouraged to call with any further questions or concerns.    Portions of the record may have been created with voice recognition software.  Occasional wrong word or \"sound a like\" substitutions may have occurred due to the inherent limitations of voice recognition software.  Read the chart carefully and recognize, using context, where substitutions have occurred.    Electronically Signed by Suzanna Cedeno MD    ______________________________________________________________________    Chief Complaint:   Chief Complaint   Patient presents with    Follow-up       Patient ID: Artur is a 74 y.o. y.o. male has a past medical history of Angina pectoris (HCC), Aortic aneurysm (HCC), Arthritis (2010), Cardiac disease, COPD (chronic obstructive pulmonary disease) ([facility]), Coronary artery disease, Diverticulitis of colon (02/14/2022), DVT (deep venous thrombosis) ([facility]), Fungal dermatitis, GERD (gastroesophageal reflux disease), Hyperlipidemia, Hypertension, MRSA (methicillin resistant Staphylococcus aureus) infection, Pneumonia (1953), Post traumatic stress disorder (PTSD), Psychiatric disorder, and Visual impairment.    8/1/2024  Patient presents today for follow-up " visit.  Patient is a very pleasant 74-year-old gentleman with greater than 70 pack-year history, who quit in 2010  Here for follow-up with CT lung screening    primary symptoms  Associated symptoms include headaches. Pertinent negatives include no chest pain, fever, myalgias or sore throat.       Review of Systems   Constitutional: Negative.  Negative for appetite change and fever.   HENT: Negative.  Negative for ear pain, postnasal drip, rhinorrhea, sneezing, sore throat and trouble swallowing.    Eyes: Negative.    Respiratory:  Positive for shortness of breath.    Cardiovascular: Negative.  Negative for chest pain.   Gastrointestinal: Negative.    Endocrine: Negative.    Genitourinary: Negative.    Musculoskeletal: Negative.  Negative for myalgias.   Allergic/Immunologic: Negative.    Neurological:  Positive for headaches.   Psychiatric/Behavioral: Negative.         Smoking history: He reports that he quit smoking about 14 years ago. His smoking use included cigarettes. He started smoking about 60 years ago. He has a 92 pack-year smoking history. He has quit using smokeless tobacco.    The following portions of the patient's history were reviewed and updated as appropriate: allergies, current medications, past family history, past medical history, past social history, past surgical history, and problem list.    Immunization History   Administered Date(s) Administered    COVID-19 PFIZER VACCINE 0.3 ML IM 02/01/2021, 02/23/2021, 10/20/2021    INFLUENZA 01/08/2004, 01/01/2005, 10/24/2005, 12/06/2006, 10/29/2007, 10/21/2008, 09/17/2009, 10/05/2010, 11/14/2011, 10/25/2012, 10/23/2013, 03/27/2014, 10/01/2018, 10/15/2018, 10/01/2021, 10/24/2022    Influenza Quadrivalent Preservative Free 3 years and older IM 10/04/2016    Influenza Split High Dose Preservative Free IM 10/04/2017    Influenza, high dose seasonal 0.7 mL 09/16/2019, 10/23/2020    Pneumococcal 10/19/2004    Pneumococcal Conjugate 13-Valent 08/26/2016,  10/25/2019    Pneumococcal Polysaccharide PPV23 10/17/2018    Td (adult), Unspecified 08/05/2014     Current Outpatient Medications   Medication Sig Dispense Refill    lisinopril (ZESTRIL) 10 mg tablet take 1 tablet by mouth once daily 90 tablet 1    Multiple Vitamin (multivitamin) capsule Take 1 capsule by mouth daily      RESTASIS 0.05 % ophthalmic emulsion INSTILL 1 DROP INTO BOTH EYES TWICE A DAY  3    simvastatin (ZOCOR) 40 mg tablet Take 1 tablet (40 mg total) by mouth daily at bedtime 90 tablet 3    tadalafil (CIALIS) 20 MG tablet Take 1 tablet (20 mg total) by mouth daily as needed for erectile dysfunction 20 tablet 0    traZODone (DESYREL) 50 mg tablet Take 50 mg by mouth daily at bedtime       No current facility-administered medications for this visit.     Allergies: Morphine and codeine    Objective:  Vitals:    08/01/24 0932 08/01/24 0933   BP: 132/74    BP Location: Right arm    Patient Position: Sitting    Cuff Size: Large    Pulse: 64    Resp: 16    Temp: 98.2 °F (36.8 °C)    SpO2: 95% 95%   Weight: 118 kg (260 lb)    Height: 6' (1.829 m)    Oxygen Therapy  SpO2: 95 %  Oxygen Therapy: None (Room air)  .  Wt Readings from Last 3 Encounters:   08/01/24 118 kg (260 lb)   07/19/24 107 kg (236 lb)   03/11/24 122 kg (270 lb)     Body mass index is 35.26 kg/m².    Physical Exam  Vitals and nursing note reviewed.   Constitutional:       Appearance: He is well-developed.   HENT:      Head: Normocephalic and atraumatic.   Eyes:      Conjunctiva/sclera: Conjunctivae normal.      Pupils: Pupils are equal, round, and reactive to light.   Neck:      Thyroid: No thyromegaly.      Vascular: No JVD.   Cardiovascular:      Rate and Rhythm: Normal rate and regular rhythm.      Heart sounds: Normal heart sounds. No murmur heard.     No friction rub. No gallop.   Pulmonary:      Effort: Pulmonary effort is normal. No respiratory distress.      Breath sounds: Normal breath sounds. No wheezing or rales.   Chest:      Chest  wall: No tenderness.   Musculoskeletal:         General: No tenderness or deformity. Normal range of motion.      Cervical back: Normal range of motion and neck supple.   Lymphadenopathy:      Cervical: No cervical adenopathy.   Skin:     General: Skin is warm and dry.   Neurological:      Mental Status: He is alert and oriented to person, place, and time.         Lab Review:   Appointment on 07/30/2024   Component Date Value    PSA 07/30/2024 0.791        Diagnostics:  I have personally reviewed pertinent films in PACS  CT of chest performed on 7/1/2024 CT lung screening revealed no suspicious lung nodules    Answers submitted by the patient for this visit:  Pulmonology Questionnaire (Submitted on 7/26/2024)  Chief Complaint: Primary symptoms  Chronicity: recurrent  When did you first notice your symptoms?: more than 1 year ago  How often do your symptoms occur?: intermittently  Since you first noticed this problem, how has it changed?: unchanged  Do you have shortness of breath that occurs with effort or exertion?: Yes  Do you have ear congestion?: No  Do you have heartburn?: No  Do you have fatigue?: No  Do you have nasal congestion?: No  Do you have shortness of breath when lying flat?: No  Do you have shortness of breath when you wake up?: No  Do you have sweats?: No  Have you experienced weight loss?: No  Which of the following makes your symptoms worse?: nothing  Which of the following makes your symptoms better?: nothing

## 2024-09-03 DIAGNOSIS — I10 ESSENTIAL HYPERTENSION: ICD-10-CM

## 2024-09-04 RX ORDER — LISINOPRIL 10 MG/1
10 TABLET ORAL DAILY
Qty: 90 TABLET | Refills: 0 | Status: SHIPPED | OUTPATIENT
Start: 2024-09-04

## 2024-10-08 DIAGNOSIS — N52.01 ERECTILE DYSFUNCTION DUE TO ARTERIAL INSUFFICIENCY: ICD-10-CM

## 2024-10-09 RX ORDER — TADALAFIL 20 MG/1
20 TABLET ORAL DAILY PRN
Qty: 20 TABLET | Refills: 0 | Status: SHIPPED | OUTPATIENT
Start: 2024-10-09

## 2024-11-04 ENCOUNTER — OFFICE VISIT (OUTPATIENT)
Dept: FAMILY MEDICINE CLINIC | Facility: CLINIC | Age: 74
End: 2024-11-04
Payer: MEDICARE

## 2024-11-04 VITALS
HEART RATE: 66 BPM | TEMPERATURE: 97.6 F | HEIGHT: 72 IN | WEIGHT: 261 LBS | DIASTOLIC BLOOD PRESSURE: 76 MMHG | SYSTOLIC BLOOD PRESSURE: 138 MMHG | BODY MASS INDEX: 35.35 KG/M2 | OXYGEN SATURATION: 96 %

## 2024-11-04 DIAGNOSIS — R68.89 FLU-LIKE SYMPTOMS: Primary | ICD-10-CM

## 2024-11-04 DIAGNOSIS — J01.10 ACUTE NON-RECURRENT FRONTAL SINUSITIS: ICD-10-CM

## 2024-11-04 DIAGNOSIS — J00 HEAD COLD: ICD-10-CM

## 2024-11-04 DIAGNOSIS — E66.01 OBESITY, MORBID (HCC): ICD-10-CM

## 2024-11-04 LAB
SARS-COV-2 AG UPPER RESP QL IA: NEGATIVE
SL AMB POCT RAPID FLU A: NEGATIVE
SL AMB POCT RAPID FLU B: NEGATIVE
VALID CONTROL: NORMAL

## 2024-11-04 PROCEDURE — G2211 COMPLEX E/M VISIT ADD ON: HCPCS | Performed by: STUDENT IN AN ORGANIZED HEALTH CARE EDUCATION/TRAINING PROGRAM

## 2024-11-04 PROCEDURE — 87804 INFLUENZA ASSAY W/OPTIC: CPT | Performed by: STUDENT IN AN ORGANIZED HEALTH CARE EDUCATION/TRAINING PROGRAM

## 2024-11-04 PROCEDURE — 99214 OFFICE O/P EST MOD 30 MIN: CPT | Performed by: STUDENT IN AN ORGANIZED HEALTH CARE EDUCATION/TRAINING PROGRAM

## 2024-11-04 PROCEDURE — 87811 SARS-COV-2 COVID19 W/OPTIC: CPT | Performed by: STUDENT IN AN ORGANIZED HEALTH CARE EDUCATION/TRAINING PROGRAM

## 2024-11-04 RX ORDER — AZITHROMYCIN 250 MG/1
TABLET, FILM COATED ORAL
Qty: 6 TABLET | Refills: 0 | Status: SHIPPED | OUTPATIENT
Start: 2024-11-04 | End: 2024-11-08

## 2024-11-04 NOTE — PROGRESS NOTES
Ambulatory Visit  Name: Artur Del Toro Jr.      : 1950      MRN: [id number]  Encounter Provider: Keshia Hanley MD  Encounter Date: 2024   Encounter department: [facility] [address]    Assessment & Plan  Flu-like symptoms    Orders:    POCT Rapid Covid Ag    POCT rapid flu A and B    Obesity, morbid (HCC)           Head cold    Orders:    azithromycin (ZITHROMAX) 250 mg tablet; Take 2 tablets today then 1 tablet daily x 4 days    Acute non-recurrent frontal sinusitis    Orders:    azithromycin (ZITHROMAX) 250 mg tablet; Take 2 tablets today then 1 tablet daily x 4 days       History of Present Illness     HPI    About 5 days of Uri symptoms, head congestion, headache, hoarse voice, sore throat from coughing, otc medications not providing relief. No sick contact. No fevers or chills, felt slightly sick to his stomach last night briefly.     Symptoms have worsened since the weekend      Review of Systems   Constitutional:  Positive for fatigue. Negative for activity change, appetite change and fever.   HENT:  Positive for congestion, rhinorrhea, sore throat and voice change.    Eyes:  Negative for visual disturbance.   Respiratory:  Positive for cough. Negative for shortness of breath.    Cardiovascular:  Negative for chest pain.   Gastrointestinal:  Negative for nausea and vomiting.           Objective     /76   Pulse 66   Temp 97.6 °F (36.4 °C)   Ht 6' (1.829 m)   Wt 118 kg (261 lb)   SpO2 96%   BMI 35.40 kg/m²     Physical Exam  Constitutional:       General: He is not in acute distress.     Appearance: Normal appearance. He is not ill-appearing, toxic-appearing or diaphoretic.   HENT:      Head: Normocephalic and atraumatic.      Nose: Mucosal edema, congestion and rhinorrhea present.   Cardiovascular:      Rate and Rhythm: Normal rate and regular rhythm.      Pulses: Normal pulses.   Pulmonary:      Effort: Pulmonary effort is normal.      Breath  sounds: Normal breath sounds and air entry. Transmitted upper airway sounds present.   Musculoskeletal:      Cervical back: Normal range of motion.   Neurological:      Mental Status: He is alert.

## 2024-11-24 DIAGNOSIS — I10 ESSENTIAL HYPERTENSION: ICD-10-CM

## 2024-11-26 RX ORDER — LISINOPRIL 10 MG/1
10 TABLET ORAL DAILY
Qty: 90 TABLET | Refills: 3 | Status: SHIPPED | OUTPATIENT
Start: 2024-11-26

## 2024-12-27 DIAGNOSIS — N52.01 ERECTILE DYSFUNCTION DUE TO ARTERIAL INSUFFICIENCY: ICD-10-CM

## 2024-12-28 RX ORDER — TADALAFIL 20 MG/1
20 TABLET ORAL DAILY PRN
Qty: 20 TABLET | Refills: 0 | Status: SHIPPED | OUTPATIENT
Start: 2024-12-28

## 2025-01-14 ENCOUNTER — TELEPHONE (OUTPATIENT)
Age: 75
End: 2025-01-14

## 2025-01-22 ENCOUNTER — TELEPHONE (OUTPATIENT)
Age: 75
End: 2025-01-22

## 2025-01-22 DIAGNOSIS — Z12.11 ENCOUNTER FOR SCREENING COLONOSCOPY: Primary | ICD-10-CM

## 2025-01-22 DIAGNOSIS — D49.0 COLORECTAL NEOPLASM: Primary | ICD-10-CM

## 2025-01-22 NOTE — TELEPHONE ENCOUNTER
Called patient - not due for colonoscpy recall until 02/14/2027 - 5 year.  Doctor does want patient to have a Fit test.  Order in EPIC. Patient will have test.

## 2025-01-22 NOTE — TELEPHONE ENCOUNTER
Patients GI provider:  Dr. Amaya    Number to return call: [phone]    Reason for call: Pt called because he received a recall letter for his next colonoscopy  but further reviewing  his report he is not due for another colonoscopy until 02/2027 please review and reach out to patient to clarify thank you     Scheduled procedure/appointment date if applicable: n/a

## 2025-01-23 ENCOUNTER — APPOINTMENT (OUTPATIENT)
Dept: LAB | Facility: CLINIC | Age: 75
End: 2025-01-23

## 2025-01-24 ENCOUNTER — APPOINTMENT (OUTPATIENT)
Dept: LAB | Facility: CLINIC | Age: 75
End: 2025-01-24
Payer: MEDICARE

## 2025-01-24 ENCOUNTER — OFFICE VISIT (OUTPATIENT)
Dept: FAMILY MEDICINE CLINIC | Facility: CLINIC | Age: 75
End: 2025-01-24
Payer: MEDICARE

## 2025-01-24 ENCOUNTER — RESULTS FOLLOW-UP (OUTPATIENT)
Age: 75
End: 2025-01-24

## 2025-01-24 VITALS
HEART RATE: 60 BPM | RESPIRATION RATE: 18 BRPM | OXYGEN SATURATION: 96 % | DIASTOLIC BLOOD PRESSURE: 70 MMHG | BODY MASS INDEX: 36.3 KG/M2 | HEIGHT: 72 IN | SYSTOLIC BLOOD PRESSURE: 110 MMHG | WEIGHT: 268 LBS

## 2025-01-24 DIAGNOSIS — Z00.00 MEDICARE ANNUAL WELLNESS VISIT, SUBSEQUENT: Primary | ICD-10-CM

## 2025-01-24 DIAGNOSIS — G44.209 TENSION HEADACHE: ICD-10-CM

## 2025-01-24 DIAGNOSIS — N18.31 STAGE 3A CHRONIC KIDNEY DISEASE (HCC): ICD-10-CM

## 2025-01-24 DIAGNOSIS — D49.0 COLORECTAL NEOPLASM: ICD-10-CM

## 2025-01-24 DIAGNOSIS — E66.01 OBESITY, MORBID (HCC): ICD-10-CM

## 2025-01-24 PROBLEM — I71.21 ANEURYSM OF ASCENDING AORTA WITHOUT RUPTURE (HCC): Status: RESOLVED | Noted: 2022-12-30 | Resolved: 2025-01-24

## 2025-01-24 LAB — HEMOCCULT STL QL IA: NEGATIVE

## 2025-01-24 PROCEDURE — 99213 OFFICE O/P EST LOW 20 MIN: CPT | Performed by: FAMILY MEDICINE

## 2025-01-24 PROCEDURE — G0439 PPPS, SUBSEQ VISIT: HCPCS | Performed by: FAMILY MEDICINE

## 2025-01-24 PROCEDURE — G0328 FECAL BLOOD SCRN IMMUNOASSAY: HCPCS

## 2025-01-24 PROCEDURE — G2211 COMPLEX E/M VISIT ADD ON: HCPCS | Performed by: FAMILY MEDICINE

## 2025-01-24 NOTE — PATIENT INSTRUCTIONS
Medicare Preventive Visit Patient Instructions  Thank you for completing your Welcome to Medicare Visit or Medicare Annual Wellness Visit today. Your next wellness visit will be due in one year (1/25/2026).  The screening/preventive services that you may require over the next 5-10 years are detailed below. Some tests may not apply to you based off risk factors and/or age. Screening tests ordered at today's visit but not completed yet may show as past due. Also, please note that scanned in results may not display below.  Preventive Screenings:  Service Recommendations Previous Testing/Comments   Colorectal Cancer Screening  Colonoscopy    Fecal Occult Blood Test (FOBT)/Fecal Immunochemical Test (FIT)  Fecal DNA/Cologuard Test  Flexible Sigmoidoscopy Age: 45-75 years old   Colonoscopy: every 10 years (May be performed more frequently if at higher risk)  OR  FOBT/FIT: every 1 year  OR  Cologuard: every 3 years  OR  Sigmoidoscopy: every 5 years  Screening may be recommended earlier than age 45 if at higher risk for colorectal cancer. Also, an individualized decision between you and your healthcare provider will decide whether screening between the ages of 76-85 would be appropriate. Colonoscopy: 02/14/2022  FOBT/FIT: 01/24/2025  Cologuard: Not on file  Sigmoidoscopy: Not on file    Screening Current     Prostate Cancer Screening Individualized decision between patient and health care provider in men between ages of 55-69   Medicare will cover every 12 months beginning on the day after your 50th birthday PSA: 0.791 ng/mL     Screening Not Indicated     Hepatitis C Screening Once for adults born between 1945 and 1965  More frequently in patients at high risk for Hepatitis C Hep C Antibody: Not on file        Diabetes Screening 1-2 times per year if you're at risk for diabetes or have pre-diabetes Fasting glucose: 118 mg/dL (7/5/2023)  A1C: 5.6 % (3/3/2021)      Cholesterol Screening Once every 5 years if you don't have a  lipid disorder. May order more often based on risk factors. Lipid panel: 07/05/2023  Screening Not Indicated  History Lipid Disorder      Other Preventive Screenings Covered by Medicare:  Abdominal Aortic Aneurysm (AAA) Screening: covered once if your at risk. You're considered to be at risk if you have a family history of AAA or a male between the age of 65-75 who smoking at least 100 cigarettes in your lifetime.  Lung Cancer Screening: covers low dose CT scan once per year if you meet all of the following conditions: (1) Age 55-77; (2) No signs or symptoms of lung cancer; (3) Current smoker or have quit smoking within the last 15 years; (4) You have a tobacco smoking history of at least 20 pack years (packs per day x number of years you smoked); (5) You get a written order from a healthcare provider.  Glaucoma Screening: covered annually if you're considered high risk: (1) You have diabetes OR (2) Family history of glaucoma OR (3)  aged 50 and older OR (4)  American aged 65 and older  Osteoporosis Screening: covered every 2 years if you meet one of the following conditions: (1) Have a vertebral abnormality; (2) On glucocorticoid therapy for more than 3 months; (3) Have primary hyperparathyroidism; (4) On osteoporosis medications and need to assess response to drug therapy.  HIV Screening: covered annually if you're between the age of 15-65. Also covered annually if you are younger than 15 and older than 65 with risk factors for HIV infection. For pregnant patients, it is covered up to 3 times per pregnancy.    Immunizations:  Immunization Recommendations   Influenza Vaccine Annual influenza vaccination during flu season is recommended for all persons aged >= 6 months who do not have contraindications   Pneumococcal Vaccine   * Pneumococcal conjugate vaccine = PCV13 (Prevnar 13), PCV15 (Vaxneuvance), PCV20 (Prevnar 20)  * Pneumococcal polysaccharide vaccine = PPSV23 (Pneumovax) Adults 19-64  yo with certain risk factors or if 65+ yo  If never received any pneumonia vaccine: recommend Prevnar 20 (PCV20)  Give PCV20 if previously received 1 dose of PCV13 or PPSV23   Hepatitis B Vaccine 3 dose series if at intermediate or high risk (ex: diabetes, end stage renal disease, liver disease)   Respiratory syncytial virus (RSV) Vaccine - COVERED BY MEDICARE PART D  * RSVPreF3 (Arexvy) CDC recommends that adults 60 years of age and older may receive a single dose of RSV vaccine using shared clinical decision-making (SCDM)   Tetanus (Td) Vaccine - COST NOT COVERED BY MEDICARE PART B Following completion of primary series, a booster dose should be given every 10 years to maintain immunity against tetanus. Td may also be given as tetanus wound prophylaxis.   Tdap Vaccine - COST NOT COVERED BY MEDICARE PART B Recommended at least once for all adults. For pregnant patients, recommended with each pregnancy.   Shingles Vaccine (Shingrix) - COST NOT COVERED BY MEDICARE PART B  2 shot series recommended in those 19 years and older who have or will have weakened immune systems or those 50 years and older     Health Maintenance Due:      Topic Date Due   • Colorectal Cancer Screening  02/14/2027   • Hepatitis C Screening  Discontinued   • Lung Cancer Screening  Discontinued     Immunizations Due:      Topic Date Due   • Hepatitis A Vaccine (1 of 2 - Risk 2-dose series) Never done   • COVID-19 Vaccine (4 - 2024-25 season) 09/01/2024     Advance Directives   What are advance directives?  Advance directives are legal documents that state your wishes and plans for medical care. These plans are made ahead of time in case you lose your ability to make decisions for yourself. Advance directives can apply to any medical decision, such as the treatments you want, and if you want to donate organs.   What are the types of advance directives?  There are many types of advance directives, and each state has rules about how to use them.  You may choose a combination of any of the following:  Living will:  This is a written record of the treatment you want. You can also choose which treatments you do not want, which to limit, and which to stop at a certain time. This includes surgery, medicine, IV fluid, and tube feedings.   Durable power of  for healthcare (DPAHC):  This is a written record that states who you want to make healthcare choices for you when you are unable to make them for yourself. This person, called a proxy, is usually a family member or a friend. You may choose more than 1 proxy.  Do not resuscitate (DNR) order:  A DNR order is used in case your heart stops beating or you stop breathing. It is a request not to have certain forms of treatment, such as CPR. A DNR order may be included in other types of advance directives.  Medical directive:  This covers the care that you want if you are in a coma, near death, or unable to make decisions for yourself. You can list the treatments you want for each condition. Treatment may include pain medicine, surgery, blood transfusions, dialysis, IV or tube feedings, and a ventilator (breathing machine).  Values history:  This document has questions about your views, beliefs, and how you feel and think about life. This information can help others choose the care that you would choose.  Why are advance directives important?  An advance directive helps you control your care. Although spoken wishes may be used, it is better to have your wishes written down. Spoken wishes can be misunderstood, or not followed. Treatments may be given even if you do not want them. An advance directive may make it easier for your family to make difficult choices about your care.   Weight Management   Why it is important to manage your weight:  Being overweight increases your risk of health conditions such as heart disease, high blood pressure, type 2 diabetes, and certain types of cancer. It can also increase your  risk for osteoarthritis, sleep apnea, and other respiratory problems. Aim for a slow, steady weight loss. Even a small amount of weight loss can lower your risk of health problems.  How to lose weight safely:  A safe and healthy way to lose weight is to eat fewer calories and get regular exercise. You can lose up about 1 pound a week by decreasing the number of calories you eat by 500 calories each day.   Healthy meal plan for weight management:  A healthy meal plan includes a variety of foods, contains fewer calories, and helps you stay healthy. A healthy meal plan includes the following:  Eat whole-grain foods more often.  A healthy meal plan should contain fiber. Fiber is the part of grains, fruits, and vegetables that is not broken down by your body. Whole-grain foods are healthy and provide extra fiber in your diet. Some examples of whole-grain foods are whole-wheat breads and pastas, oatmeal, brown rice, and bulgur.  Eat a variety of vegetables every day.  Include dark, leafy greens such as spinach, kale, sina greens, and mustard greens. Eat yellow and orange vegetables such as carrots, sweet potatoes, and winter squash.   Eat a variety of fruits every day.  Choose fresh or canned fruit (canned in its own juice or light syrup) instead of juice. Fruit juice has very little or no fiber.  Eat low-fat dairy foods.  Drink fat-free (skim) milk or 1% milk. Eat fat-free yogurt and low-fat cottage cheese. Try low-fat cheeses such as mozzarella and other reduced-fat cheeses.  Choose meat and other protein foods that are low in fat.  Choose beans or other legumes such as split peas or lentils. Choose fish, skinless poultry (chicken or turkey), or lean cuts of red meat (beef or pork). Before you cook meat or poultry, cut off any visible fat.   Use less fat and oil.  Try baking foods instead of frying them. Add less fat, such as margarine, sour cream, regular salad dressing and mayonnaise to foods. Eat fewer high-fat  foods. Some examples of high-fat foods include french fries, doughnuts, ice cream, and cakes.  Eat fewer sweets.  Limit foods and drinks that are high in sugar. This includes candy, cookies, regular soda, and sweetened drinks.  Exercise:  Exercise at least 30 minutes per day on most days of the week. Some examples of exercise include walking, biking, dancing, and swimming. You can also fit in more physical activity by taking the stairs instead of the elevator or parking farther away from stores. Ask your healthcare provider about the best exercise plan for you.      © Copyright [employer] 2018 Information is for End User's use only and may not be sold, redistributed or otherwise used for commercial purposes. All illustrations and images included in CareNotes® are the copyrighted property of [employer] or [employer]

## 2025-01-24 NOTE — ASSESSMENT & PLAN NOTE
Lab Results   Component Value Date    EGFR 59 07/05/2023    EGFR 66 10/26/2022    EGFR 67 04/08/2022    CREATININE 1.20 07/05/2023    CREATININE 1.10 10/26/2022    CREATININE 1.09 04/08/2022   Stable, [facility].

## 2025-01-24 NOTE — PROGRESS NOTES
Name: Artur Del Toro Jr.      : 1950      MRN: [id number]  Encounter Provider: Hardy Doshi DO  Encounter Date: 2025   Encounter department: [facility] [address]    Assessment & Plan  Medicare annual wellness visit, subsequent         Obesity, morbid (HCC)  Increase activity, just diet.       Stage 3a chronic kidney disease (HCC)  Lab Results   Component Value Date    EGFR 59 2023    EGFR 66 10/26/2022    EGFR 67 2022    CREATININE 1.20 2023    CREATININE 1.10 10/26/2022    CREATININE 1.09 2022   Stable, [facility].       Tension headache  I encouraged him to use warm moist heat to the right side of his neck          Preventive health issues were discussed with patient, and age appropriate screening tests were ordered as noted in patient's After Visit Summary. Personalized health advice and appropriate referrals for health education or preventive services given if needed, as noted in patient's After Visit Summary.    History of Present Illness     Patient comes in today for a checkup.  He states that he is doing well with the exception of some worsening headaches that start in the back of the right side of his neck.  He denies any other associated symptoms.  [facility] and brought them with him today.       Patient Care Team:  Hardy Doshi DO as PCP - General  Dakota Colmenares MD as Endoscopist    Review of Systems   Constitutional:  Negative for chills, fatigue and fever.   HENT:  Negative for congestion, ear pain, hearing loss, postnasal drip, rhinorrhea and sore throat.    Eyes:  Negative for pain and visual disturbance.   Respiratory:  Negative for chest tightness, shortness of breath and wheezing.    Cardiovascular:  Negative for chest pain and leg swelling.   Gastrointestinal:  Negative for abdominal distention, abdominal pain, constipation, diarrhea and vomiting.    Endocrine: Negative for cold intolerance and heat intolerance.   Genitourinary:  Negative for difficulty urinating, frequency and urgency.   Musculoskeletal:  Positive for neck pain. Negative for arthralgias and gait problem.   Skin:  Negative for color change.   Neurological:  Positive for headaches. Negative for dizziness, tremors, syncope and numbness.   Hematological:  Negative for adenopathy.   Psychiatric/Behavioral:  Negative for agitation, confusion and sleep disturbance. The patient is not nervous/anxious.      Medical History Reviewed by provider this encounter:  Tobacco  Allergies  Meds  Problems  Med Hx  Surg Hx  Fam Hx       Annual Wellness Visit Questionnaire   Artur is here for his Subsequent Wellness visit. Last Medicare Wellness visit information reviewed, patient interviewed and updates made to the record today.      Health Risk Assessment:   Patient rates overall health as good. Patient feels that their physical health rating is same. Patient is satisfied with their life. Eyesight was rated as same. Hearing was rated as same. Patient feels that their emotional and mental health rating is same. Patients states they are never, rarely angry. Patient states they are never, rarely unusually tired/fatigued. Pain experienced in the last 7 days has been none. Patient states that he has experienced no weight loss or gain in last 6 months.     Depression Screening:   PHQ-2 Score: 0      Fall Risk Screening:   In the past year, patient has experienced: no history of falling in past year      Home Safety:  Patient does not have trouble with stairs inside or outside of their home. Patient has working smoke alarms and has working carbon monoxide detector. Home safety hazards include: none.     Nutrition:   Current diet is Regular.     Medications:   Patient is not currently taking any over-the-counter supplements. Patient is able to manage medications.     Activities of Daily Living  (ADLs)/Instrumental Activities of Daily Living (IADLs):   Walk and transfer into and out of bed and chair?: Yes  Dress and groom yourself?: Yes    Bathe or shower yourself?: Yes    Feed yourself? Yes  Do your laundry/housekeeping?: Yes  Manage your money, pay your bills and track your expenses?: Yes  Make your own meals?: Yes    Do your own shopping?: Yes    Previous Hospitalizations:   Any hospitalizations or ED visits within the last 12 months?: No      Advance Care Planning:   Living will: No      Cognitive Screening:   Provider or family/friend/caregiver concerned regarding cognition?: No    PREVENTIVE SCREENINGS      Cardiovascular Screening:    General: Screening Not Indicated and History Lipid Disorder      Diabetes Screening:     General: Screening Not Indicated      Colorectal Cancer Screening:     General: Screening Current      Prostate Cancer Screening:    General: Screening Not Indicated      Osteoporosis Screening:    General: Screening Not Indicated      Abdominal Aortic Aneurysm (AAA) Screening:    Risk factors include: age between 65-76 yo and tobacco use        Lung Cancer Screening:     General: Screening Current    Screening, Brief Intervention, and Referral to Treatment (SBIRT)    Screening  Typical number of drinks in a day: 0  Typical number of drinks in a week: 0  Interpretation: Low risk drinking behavior.    Single Item Drug Screening:  How often have you used an illegal drug (including marijuana) or a prescription medication for non-medical reasons in the past year? never    Single Item Drug Screen Score: 0  Interpretation: Negative screen for possible drug use disorder    Social Drivers of Health     Financial Resource Strain: Low Risk  (1/19/2024)    Overall Financial Resource Strain (CARDIA)    • Difficulty of Paying Living Expenses: Not hard at all   Food Insecurity: No Food Insecurity (1/24/2025)    Hunger Vital Sign    • Worried About Running Out of Food in the Last Year: Never true     • Ran Out of Food in the Last Year: Never true   Transportation Needs: No Transportation Needs (1/24/2025)    PRAPARE - Transportation    • Lack of Transportation (Medical): No    • Lack of Transportation (Non-Medical): No   Housing Stability: Low Risk  (1/24/2025)    Housing Stability Vital Sign    • Unable to Pay for Housing in the Last Year: No    • Number of Times Moved in the Last Year: 1    • Homeless in the Last Year: No   Utilities: Not At Risk (1/24/2025)    [facility] Utilities    • Threatened with loss of utilities: No     No results found.    Objective   /70 (BP Location: Left arm, Patient Position: Sitting, Cuff Size: Large)   Pulse 60   Resp 18   Ht 6' (1.829 m)   Wt 122 kg (268 lb)   SpO2 96%   BMI 36.35 kg/m²     Physical Exam  Constitutional:       Appearance: He is well-developed.   HENT:      Head: Normocephalic.      Right Ear: External ear normal.      Left Ear: External ear normal.      Nose: Nose normal.   Eyes:      Extraocular Movements: Extraocular movements intact.      Conjunctiva/sclera: Conjunctivae normal.      Pupils: Pupils are equal, round, and reactive to light.   Neck:      Thyroid: No thyromegaly.   Cardiovascular:      Rate and Rhythm: Normal rate and regular rhythm.      Heart sounds: Normal heart sounds.   Pulmonary:      Effort: Pulmonary effort is normal.      Breath sounds: Normal breath sounds.   Abdominal:      General: Bowel sounds are normal.      Palpations: Abdomen is soft.   Musculoskeletal:         General: Tenderness (Right superior cervical paraspinal muscles) present. Normal range of motion.      Cervical back: Normal range of motion.   Skin:     General: Skin is warm and dry.   Neurological:      Mental Status: He is alert and oriented to person, place, and time.   Psychiatric:         Mood and Affect: Mood normal.         Behavior: Behavior normal.

## 2025-03-26 ENCOUNTER — OFFICE VISIT (OUTPATIENT)
Dept: CARDIOLOGY CLINIC | Facility: CLINIC | Age: 75
End: 2025-03-26

## 2025-03-26 VITALS
DIASTOLIC BLOOD PRESSURE: 72 MMHG | WEIGHT: 265.2 LBS | HEIGHT: 72 IN | HEART RATE: 58 BPM | SYSTOLIC BLOOD PRESSURE: 140 MMHG | BODY MASS INDEX: 35.92 KG/M2

## 2025-03-26 DIAGNOSIS — I71.21 ANEURYSM OF ASCENDING AORTA WITHOUT RUPTURE (HCC): Primary | ICD-10-CM

## 2025-03-26 DIAGNOSIS — E78.2 MIXED HYPERLIPIDEMIA: ICD-10-CM

## 2025-03-26 DIAGNOSIS — I10 ESSENTIAL HYPERTENSION: ICD-10-CM

## 2025-03-26 RX ORDER — ATORVASTATIN CALCIUM 40 MG/1
40 TABLET, FILM COATED ORAL DAILY
Qty: 90 TABLET | Refills: 5 | Status: SHIPPED | OUTPATIENT
Start: 2025-03-26

## 2025-03-26 NOTE — PROGRESS NOTES
Patient ID: Artur Del Toro Jr. is a 74 y.o. male.        Plan:      Assessment & Plan  Aneurysm of ascending aorta without rupture (HCC)  This has been stable at 4.1 cm for many years.  He has having a repeat CT of the chest for cancer screening this summer.  Assuming that the value is similar imaging can be stretched out further in terms of aneurysm surveillance.  Essential hypertension  Borderline high today but well-controlled at home.  No changes in regimen are needed today.  Mixed hyperlipidemia  Given coronary calcification on imaging, I am going to switch the simvastatin 40 mg daily to atorvastatin 40 mg daily.      Follow up Plan/Other summary comments:  Return in about 2 years (around 3/26/2027).  He knows that he can call me if there is any potential cardiac symptoms in the interim.  As well at that point I will see what imaging has been performed and order for further surveillance.  This assumes that July's CT of the chest is similar to prior.    HPI: Patient is seen today to assume care.  I had seen him in the distant past.  He had been seeing one of my colleagues who has moved out of the area.  Artur is seen for issues described above.  He has had no recent change in exertional tolerance.  No chest pain palpitations syncope or near syncope.      Results for orders placed or performed in visit on 03/26/25   POCT ECG    Impression    Sinus rhythm.  Normal.         Most recent or relevant cardiac/vascular testing:    CT of chest 7/1/2024: 4.1 cm ascending aorta which is unchanged from May 2020.  Coronary angiography 3/10/2020: Left dominant system.  Only minor luminal irregularities are seen.    Past Surgical History:   Procedure Laterality Date    CATARACT EXTRACTION      CHOLECYSTECTOMY      CHOLECYSTECTOMY LAPAROSCOPIC N/A 03/07/2016    Procedure: CHOLECYSTECTOMY LAPAROSCOPIC;  Surgeon: Conrad Schwartz DO;  Location: [facility];  Service:     COLONOSCOPY      EYE SURGERY      HERNIA REPAIR N/A  unknown, supraumbilical    HERNIA REPAIR Left 04/21/2022    Procedure: EXCISION OF CORD LIPOMA , GROIN EXPLORATION;  Surgeon: Horacio Phillip MD;  Location: [facility];  Service: General    JOINT REPLACEMENT Bilateral     hips and knees    SC COLONOSCOPY FLX DX W/COLLJ SPEC WHEN PFRMD N/A 01/09/2018    Procedure: COLONOSCOPY;  Surgeon: Dakota Colmenares MD;  Location: [facility];  Service: Gastroenterology    TOTAL HIP ARTHROPLASTY Bilateral 2009, 2010    TOTAL KNEE ARTHROPLASTY Bilateral 2014       Lipid Profile: Reviewed      Review of Systems   10  point ROS  was otherwise non pertinent or negative except as per HPI or as below.   Gait:  Normal.        Objective:     /72   Pulse 58   Ht 6' (1.829 m)   Wt 120 kg (265 lb 3.2 oz)   BMI 35.97 kg/m²     PHYSICAL EXAM:    General:  Normal appearance in no distress.  Eyes:  Anicteric.  Oral mucosa:  Moist.  Neck:  No JVD. Carotid upstrokes are brisk without bruits.  No masses.  Chest:  Clear to auscultation.  Cardiac:  No palpable PMI.  Normal S1 and S2.  No murmur gallop or rub.  Abdomen:  Soft and nontender. No palpable organomegaly or aortic enlargement.  Extremities:  No peripheral edema.  Musculoskeletal:  Symmetric.   Vascular:  Femoral pulses are brisk without bruits.  Popliteal pulses are intact bilaterally.   Pedal pulses are intact.  Neuro:  Grossly symmetric.  Psych:  Alert and oriented x3.      Meds reviewed.    Past Medical History:   Diagnosis Date    Aneurysm ([facility]) 2020    Aneurysm of ascending aorta without rupture ([facility]) 12/30/2022    Angina pectoris ([facility])     Aortic aneurysm ([facility])     under observation    Arthritis 2010    BPH (benign prostatic hypertrophy) 2/14/2022    Cardiac disease     COPD (chronic obstructive pulmonary disease) ([facility])     Coronary artery disease     Diverticulitis of colon 02/14/2022    DVT (deep venous thrombosis) ([facility])     2015    Erectile dysfunction 2019    Fungal dermatitis     GERD (gastroesophageal reflux  disease)     Hyperlipidemia     Hypertension     per [employer]: Essential hypertension ith goal blood pressure less than 130/85; Resolved:4/20/17    MRSA (methicillin resistant Staphylococcus aureus) infection     Pneumonia 1953    Post traumatic stress disorder (PTSD)     Psychiatric disorder     Tinnitus 2000    Urinary incontinence     Visual impairment            Social History     Tobacco Use   Smoking Status Former    Current packs/day: 0.00    Average packs/day: 2.0 packs/day for 46.0 years (92.0 ttl pk-yrs)    Types: Cigarettes    Start date: 1964    Quit date: 2010    Years since quitting: 15.2   Smokeless Tobacco Never

## 2025-03-26 NOTE — ASSESSMENT & PLAN NOTE
This has been stable at 4.1 cm for many years.  He has having a repeat CT of the chest for cancer screening this summer.  Assuming that the value is similar imaging can be stretched out further in terms of aneurysm surveillance.

## 2025-03-26 NOTE — ASSESSMENT & PLAN NOTE
Given coronary calcification on imaging, I am going to switch the simvastatin 40 mg daily to atorvastatin 40 mg daily.

## 2025-03-31 ENCOUNTER — TELEPHONE (OUTPATIENT)
Age: 75
End: 2025-03-31

## 2025-05-09 ENCOUNTER — NURSE TRIAGE (OUTPATIENT)
Age: 75
End: 2025-05-09

## 2025-05-09 NOTE — TELEPHONE ENCOUNTER
"FOLLOW UP: Home Care    REASON FOR CONVERSATION: Tick Bite    SYMPTOMS: small area of redness around tick bite site.    OTHER: Patient had tick bite to unspecified hip-patient was able to remove tick with head intact. Small opening where tick bit, with some redness. Will call back if symptoms worsen      DISPOSITION: Home Care        Reason for Disposition   Tick bite with no complications    Answer Assessment - Initial Assessment Questions  1. ATTACHED:  \"Is the tick still on the skin?\"  (e.g., yes, no, unsure)      No   2. ONSET - TICK STILL ATTACHED:  \"How long do you think the tick has been on your skin?\" (e.g., hours, days, unsure)  Note:  Is there a recent activity (camping, hiking) where the caller may have been exposed?      *No Answer*  3. ONSET - TICK NOT STILL ATTACHED: \"If the tick has been removed, how long do you think the tick was attached before you removed it?\" (e.g., 5 hours, 2 days). \"When was this?\"      *No Answer*  4. LOCATION: \"Where is the tick bite located?\" (e.g., arm, leg)      Right hip     5. TYPE of TICK: \"Is it a wood tick or a deer tick?\" (e.g., deer tick, wood tick; unsure)      *No Answer*  6. SIZE of TICK: \"How big is the tick?\" (e.g., size of poppy seed, apple seed, watermelon seed; unsure) Note: Deer ticks can be the size of a poppy seed (nymph) or an apple seed (adult).        Poppy seed     7. ENGORGED: \"Did the tick look flat or engorged (full, swollen)?\" (e.g., flat, engorged; unsure)      Flat   8. OTHER SYMPTOMS: \"Do you have any other symptoms?\" (e.g., fever, rash, redness at bite area, red ring around bite)      Redness around the bite    Protocols used: Tick Bite-Adult-[place]    "

## 2025-05-13 DIAGNOSIS — N52.01 ERECTILE DYSFUNCTION DUE TO ARTERIAL INSUFFICIENCY: ICD-10-CM

## 2025-05-13 RX ORDER — TADALAFIL 20 MG/1
20 TABLET ORAL DAILY PRN
Qty: 20 TABLET | Refills: 0 | Status: SHIPPED | OUTPATIENT
Start: 2025-05-13

## 2025-05-14 ENCOUNTER — OFFICE VISIT (OUTPATIENT)
Dept: FAMILY MEDICINE CLINIC | Facility: CLINIC | Age: 75
End: 2025-05-14
Payer: MEDICARE

## 2025-05-14 VITALS
HEART RATE: 80 BPM | DIASTOLIC BLOOD PRESSURE: 62 MMHG | HEIGHT: 72 IN | BODY MASS INDEX: 35.24 KG/M2 | TEMPERATURE: 100.9 F | SYSTOLIC BLOOD PRESSURE: 130 MMHG | WEIGHT: 260.2 LBS | OXYGEN SATURATION: 94 %
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
## 2025-05-14 DIAGNOSIS — S70.261A TICK BITE OF RIGHT HIP, INITIAL ENCOUNTER: Primary | ICD-10-CM

## 2025-05-14 PROCEDURE — 99214 OFFICE O/P EST MOD 30 MIN: CPT

## 2025-05-14 PROCEDURE — G2211 COMPLEX E/M VISIT ADD ON: HCPCS

## 2025-05-14 RX ORDER — DOXYCYCLINE 100 MG/1
100 CAPSULE ORAL EVERY 12 HOURS SCHEDULED
Qty: 28 CAPSULE | Refills: 0 | Status: SHIPPED | OUTPATIENT
Start: 2025-05-14 | End: 2025-05-28

## 2025-05-14 NOTE — PROGRESS NOTES
Name: Artur Del Toro Jr.      : 1950      MRN: [id number]  Encounter Provider: Alla Fleming PA-C  Encounter Date: 2025   Encounter department: [facility] [address]  :  Assessment & Plan  Tick bite of right hip, initial encounter  -Pt found tick on right hip x 6 days ago in [facility]. Is unsure how long it was on for  -Yesterday, he noted fever, chills, body aches, fatigue, headaches. Temp 100.9F in office  -Denies skin rash, bulls eye marking or any skin changes, hx of Lymes disease, SOB, chest pain, weakness, neck stiffness, n/v/d, neurological deficits, forgetfulness, changes in personality   -On exam, neg kernig and bruzinski signs, neuro exam unremarkable. Site of bite on right hip with surrounding erythema, no tenderness or warmth to touch. No joint swelling on exam  -Will treat with doxycycline 100 mg BID x 14 days. Ordered Tick panel   -Recommend rest, tylenol for fever, increase hydration  -RTO if no symptom improvement     Orders:    doxycycline hyclate (VIBRAMYCIN) 100 mg capsule; Take 1 capsule (100 mg total) by mouth every 12 (twelve) hours for 14 days    TICK BORNE DISEASE, ACUTE MOLECULAR PANEL; Future           History of Present Illness     HPI    Agustin presents to the office for evaluation of tick bite. He found tick on right hip last Thursday when he was in [facility]. He is unsure how long it was on for but notes he believes it was a while. He started with fever (100.9F in office), body aches, fatigue, drowsy x yesterday. He notes some headaches as well.     Denies SOB, chest pain, weakness, neck stiffness, n/v/d.     Denies skin rash, bulls eye marking or any skin changes, hx of Lymes disease.     Review of Systems   Constitutional:  Positive for fever. Negative for chills and fatigue.   HENT:  Negative for congestion, ear pain, rhinorrhea, sinus pain and sore throat.    Eyes:  Negative for pain.   Respiratory:  Negative for cough and  shortness of breath.    Cardiovascular:  Negative for chest pain and leg swelling.   Gastrointestinal:  Negative for abdominal pain, constipation, diarrhea, nausea and vomiting.   Genitourinary:  Negative for difficulty urinating, dysuria, frequency and urgency.   Musculoskeletal:  Positive for arthralgias. Negative for neck pain.   Skin:  Negative for rash.   Neurological:  Positive for headaches. Negative for dizziness.   Psychiatric/Behavioral:  Negative for sleep disturbance.        Objective   /62   Pulse 80   Temp (!) 100.9 °F (38.3 °C) (Tympanic)   Ht 6' (1.829 m)   Wt 118 kg (260 lb 3.2 oz)   SpO2 94%   BMI 35.29 kg/m²      Physical Exam  Vitals reviewed.   Constitutional:       General: He is not in acute distress.     Appearance: Normal appearance.   HENT:      Head: Normocephalic and atraumatic.      Right Ear: Tympanic membrane, ear canal and external ear normal.      Left Ear: Tympanic membrane, ear canal and external ear normal.      Nose: Nose normal.      Mouth/Throat:      Mouth: Mucous membranes are moist.     Eyes:      Extraocular Movements: Extraocular movements intact.      Right eye: Normal extraocular motion and no nystagmus.      Left eye: Normal extraocular motion and no nystagmus.      Conjunctiva/sclera: Conjunctivae normal.      Pupils: Pupils are equal, round, and reactive to light.     Neck:      Meningeal: Brudzinski's sign and Kernig's sign absent.     Cardiovascular:      Rate and Rhythm: Normal rate and regular rhythm.      Heart sounds: Normal heart sounds.   Pulmonary:      Effort: Pulmonary effort is normal.      Breath sounds: Normal breath sounds. No wheezing, rhonchi or rales.   Abdominal:      General: Bowel sounds are normal. There is no distension.      Palpations: Abdomen is soft.      Tenderness: There is no abdominal tenderness. There is no right CVA tenderness or left CVA tenderness.     Musculoskeletal:      Cervical back: Neck supple.      Right lower  leg: No edema.      Left lower leg: No edema.   Lymphadenopathy:      Cervical: No cervical adenopathy.     Skin:     General: Skin is warm.      Capillary Refill: Capillary refill takes less than 2 seconds.      Findings: No rash.     Neurological:      General: No focal deficit present.      Mental Status: He is alert and oriented to person, place, and time. Mental status is at baseline.      Motor: Motor function is intact. No pronator drift.      Coordination: Coordination is intact. Finger-Nose-Finger Test normal.      Gait: Gait is intact. Gait normal.      Comments: Negative Carolyn Fleming PA-C  [facility]  5/14/2025 12:11 PM

## 2025-05-15 ENCOUNTER — APPOINTMENT (OUTPATIENT)
Dept: LAB | Facility: HOSPITAL | Age: 75
End: 2025-05-15
Payer: MEDICARE
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
## 2025-05-15 DIAGNOSIS — S70.261A TICK BITE OF RIGHT HIP, INITIAL ENCOUNTER: ICD-10-CM

## 2025-05-15 PROCEDURE — 87468 ANAPLSMA PHGCYTOPHLM AMP PRB: CPT

## 2025-05-15 PROCEDURE — 87478 BORRELIA MIYAMOTOI AMP PRB: CPT

## 2025-05-15 PROCEDURE — 87484 EHRLICHA CHAFFEENSIS AMP PRB: CPT

## 2025-05-15 PROCEDURE — 36415 COLL VENOUS BLD VENIPUNCTURE: CPT

## 2025-05-15 PROCEDURE — 87469 BABESIA MICROTI AMP PRB: CPT

## 2025-05-15 PROCEDURE — 87801 DETECT AGNT MULT DNA AMPLI: CPT

## 2025-05-19 LAB
A PHAGOCYTOPH DNA BLD QL NAA+PROBE: DETECTED
B MICROTI DNA BLD QL NAA+PROBE: NOT DETECTED
B MIYAMOTOI FLAB SPEC QL NAA+PROBE: NOT DETECTED
BORRELIA DNA BLD QL NAA+PROBE: NOT DETECTED
COMMENT: ABNORMAL
E CHAFFEENSIS DNA BLD QL NAA+PROBE: NOT DETECTED

## 2025-05-20 ENCOUNTER — RESULTS FOLLOW-UP (OUTPATIENT)
Dept: FAMILY MEDICINE CLINIC | Facility: CLINIC | Age: 75
End: 2025-05-20

## 2025-05-30 NOTE — TELEPHONE ENCOUNTER
Pt's came in to  covid test results  Pt said he called earlier but I did not find any note on it  Pt notified about  test negative and printouts handed directly to pt  No further action needed 
Hide Include Location In Plan Question?: No
Include Location In Plan?: Yes
Detail Level: Detailed

## 2025-07-02 ENCOUNTER — OFFICE VISIT (OUTPATIENT)
Dept: FAMILY MEDICINE CLINIC | Facility: CLINIC | Age: 75
End: 2025-07-02
Payer: MEDICARE

## 2025-07-02 VITALS
HEIGHT: 72 IN | TEMPERATURE: 98.6 F | HEART RATE: 72 BPM | BODY MASS INDEX: 35.08 KG/M2 | WEIGHT: 259 LBS | SYSTOLIC BLOOD PRESSURE: 134 MMHG | OXYGEN SATURATION: 94 % | DIASTOLIC BLOOD PRESSURE: 62 MMHG

## 2025-07-02 DIAGNOSIS — J06.9 UPPER RESPIRATORY TRACT INFECTION, UNSPECIFIED TYPE: Primary | ICD-10-CM

## 2025-07-02 DIAGNOSIS — J02.9 SORE THROAT: ICD-10-CM

## 2025-07-02 LAB
SARS-COV-2 AG UPPER RESP QL IA: NEGATIVE
VALID CONTROL: NORMAL

## 2025-07-02 PROCEDURE — 87811 SARS-COV-2 COVID19 W/OPTIC: CPT

## 2025-07-02 PROCEDURE — 99214 OFFICE O/P EST MOD 30 MIN: CPT

## 2025-07-02 PROCEDURE — G2211 COMPLEX E/M VISIT ADD ON: HCPCS

## 2025-07-02 RX ORDER — FLUTICASONE PROPIONATE 50 MCG
1 SPRAY, SUSPENSION (ML) NASAL DAILY
Qty: 9.9 ML | Refills: 0 | Status: SHIPPED | OUTPATIENT
Start: 2025-07-02

## 2025-07-02 RX ORDER — BENZONATATE 100 MG/1
100 CAPSULE ORAL 3 TIMES DAILY PRN
Qty: 20 CAPSULE | Refills: 0 | Status: SHIPPED | OUTPATIENT
Start: 2025-07-02

## 2025-07-02 NOTE — PROGRESS NOTES
Name: Artur Del Toro Jr.      : 1950      MRN: [id number]  Encounter Provider: Alla Fleming PA-C  Encounter Date: 2025   Encounter department: [facility] [address]  :  Assessment & Plan  Upper respiratory tract infection, unspecified type  -sore throat, dry cough, headaches x 4 days.   -Denies fever, chills, n/v/d, SOB, wheezing, sick contacts.  -He was treated for a tick bite 2025 with confirmed anaplasmosis infection 2025 with doxycyline 100 mg BID x 14 days.   -He has taken Harish's, and throat lozenger.   -COVID negative  -Lungs CTA b/l, throat erythematous with PND, nasal congestion  - Discussed the duration of symptoms most likely indicating viral infection. We do not treat viral infections with antibiotics so they are not indicated at this time. Recommend monitoring symptoms and if there is no improvement by day 7-10, we can reevaluate need for antibiotic.  -Will order flonase and tessalon perles for symptomatic relief. RTO if no improvement in symptoms    Orders:    fluticasone (FLONASE) 50 mcg/act nasal spray; 1 spray into each nostril daily    benzonatate (TESSALON PERLES) 100 mg capsule; Take 1 capsule (100 mg total) by mouth 3 (three) times a day as needed for cough    Sore throat    Orders:    POCT Rapid Covid Ag           History of Present Illness     Cough  Associated symptoms include headaches and a sore throat. Pertinent negatives include no chest pain, chills, ear pain, fever, rash, rhinorrhea, shortness of breath or wheezing.       Agustin presents to the office for evaluation of sore throat, dry cough, headaches x 4 days.     Denies fever, chills, n/v/d, SOB, wheezing, sick contacts.    He was treated for a tick bite 2025 with confirmed anaplasmosis infection 2025 with doxycyline 100 mg BID x 14 days.     He has taken Harish's, and throat lozenger.     Pt noticed some yellow crust in right ear a couple days ago and is concerned  about possible lesion. This crust was scraped off of ear without issue and there was no skin changes underneath.     Review of Systems   Constitutional:  Negative for chills, fatigue and fever.   HENT:  Positive for sore throat. Negative for congestion, ear pain, rhinorrhea and sinus pain.    Eyes:  Negative for pain.   Respiratory:  Positive for cough. Negative for shortness of breath and wheezing.    Cardiovascular:  Negative for chest pain and leg swelling.   Gastrointestinal:  Negative for abdominal pain, constipation, diarrhea, nausea and vomiting.   Genitourinary:  Negative for difficulty urinating, dysuria, frequency and urgency.   Musculoskeletal:  Negative for neck pain.   Skin:  Negative for rash.   Neurological:  Positive for headaches. Negative for dizziness.   Psychiatric/Behavioral:  Negative for sleep disturbance.          Objective   /62   Pulse 72   Temp 98.6 °F (37 °C) (Tympanic)   Ht 6' (1.829 m)   Wt 117 kg (259 lb)   SpO2 94%   BMI 35.13 kg/m²      Physical Exam  Vitals reviewed.   Constitutional:       General: He is not in acute distress.     Appearance: Normal appearance.   HENT:      Head: Normocephalic and atraumatic.      Right Ear: External ear normal. There is impacted cerumen.      Left Ear: External ear normal. There is impacted cerumen.      Ears:        Nose: Congestion present.      Right Sinus: No maxillary sinus tenderness or frontal sinus tenderness.      Left Sinus: No maxillary sinus tenderness or frontal sinus tenderness.      Mouth/Throat:      Mouth: Mucous membranes are moist.      Pharynx: Posterior oropharyngeal erythema and postnasal drip present.     Eyes:      Extraocular Movements: Extraocular movements intact.      Conjunctiva/sclera: Conjunctivae normal.       Cardiovascular:      Rate and Rhythm: Normal rate and regular rhythm.      Heart sounds: Normal heart sounds.   Pulmonary:      Effort: Pulmonary effort is normal.      Breath sounds: Normal breath  sounds. No wheezing, rhonchi or rales.   Abdominal:      General: Bowel sounds are normal. There is no distension.      Palpations: Abdomen is soft.      Tenderness: There is no abdominal tenderness. There is no right CVA tenderness or left CVA tenderness.     Musculoskeletal:      Cervical back: Neck supple.      Right lower leg: No edema.      Left lower leg: No edema.   Lymphadenopathy:      Cervical: No cervical adenopathy.     Skin:     General: Skin is warm.      Capillary Refill: Capillary refill takes less than 2 seconds.      Findings: No rash.     Neurological:      Mental Status: He is alert. Mental status is at baseline.           Alla Fleming PA-C  [facility]  7/2/2025 12:02 PM

## 2025-07-07 ENCOUNTER — PATIENT MESSAGE (OUTPATIENT)
Dept: FAMILY MEDICINE CLINIC | Facility: CLINIC | Age: 75
End: 2025-07-07

## 2025-07-07 ENCOUNTER — HOSPITAL ENCOUNTER (OUTPATIENT)
Dept: CT IMAGING | Facility: HOSPITAL | Age: 75
Discharge: HOME | End: 2025-07-07
Attending: INTERNAL MEDICINE
Payer: MEDICARE

## 2025-07-07 DIAGNOSIS — F17.211 CIGARETTE NICOTINE DEPENDENCE IN REMISSION: ICD-10-CM

## 2025-07-07 PROCEDURE — 71271 CT THORAX LUNG CANCER SCR C-: CPT

## 2025-07-08 ENCOUNTER — NURSE TRIAGE (OUTPATIENT)
Age: 75
End: 2025-07-08

## 2025-07-08 NOTE — TELEPHONE ENCOUNTER
Regarding: Worsening cough  ----- Message from Geneva LONG sent at 7/8/2025  4:20 PM EDT -----  Per last visit, patient to RTO if symptoms do not improve    Please call to gather worsening symptoms/schedule    Agustin Del Toro Jr. to [facility] (supporting Hardy Doshi DO) (Selected Message)        7/7/25  3:55 PM  I was in  recently because of a constant cough and was given medication It seems like the medication isn't working and my cough has gotten worse   The nose spray seems fine but the Benzonatate doesn't work Is there anything else I could try   Artur Del Toro   1950

## 2025-07-08 NOTE — TELEPHONE ENCOUNTER
"Reason for Disposition  • [1] Continuous (nonstop) coughing interferes with work or school AND [2] no improvement using cough treatment per Care Advice    Answer Assessment - Initial Assessment Questions  1. ONSET: \"When did the cough begin?\"       A little over a week ago   2. SEVERITY: \"How bad is the cough today?\"       Has been very consistent   3. SPUTUM: \"Describe the color of your sputum\" (e.g., none, dry cough; clear, white, yellow, green)      Denies   4. HEMOPTYSIS: \"Are you coughing up any blood?\" If Yes, ask: \"How much?\" (e.g., flecks, streaks, tablespoons, etc.)      denies  5. DIFFICULTY BREATHING: \"Are you having difficulty breathing?\" If Yes, ask: \"How bad is it?\" (e.g., mild, moderate, severe)       Denies   6. FEVER: \"Do you have a fever?\" If Yes, ask: \"What is your temperature, how was it measured, and when did it start?\"      Denies    Protocols used: Cough - Acute Non-Productive-Adult-AH    "

## 2025-07-08 NOTE — TELEPHONE ENCOUNTER
3 call attempts made, on the 3rd call phone answered then hung up unable to leave voice message      Reason for Disposition   Third attempt to contact caller AND no contact made. Phone number verified.    Protocols used: No Contact or Duplicate Contact Call-Adult-OH

## 2025-07-08 NOTE — TELEPHONE ENCOUNTER
REASON FOR CONVERSATION: Cough    SYMPTOMS: persistent non productive cough that is not improving with prescribed tessalon perles.     OTHER HEALTH INFORMATION: pt was seen in office on 7/2/25. OV note advised to return to office if no improvement.     PROTOCOL DISPOSITION: See PCP Within 24 Hours, No Contact Call    CARE ADVICE PROVIDED: patient was given appt. For tomorrow with Alla at 9 am for no improvement with benzonatate capsules. Advise to call back with worsening symptoms and to try hot tea with lemon and honey and humidifier and to continue with nasal spray.     PRACTICE FOLLOW-UP: n/a

## 2025-07-09 ENCOUNTER — OFFICE VISIT (OUTPATIENT)
Dept: FAMILY MEDICINE CLINIC | Facility: CLINIC | Age: 75
End: 2025-07-09
Payer: MEDICARE

## 2025-07-09 VITALS
SYSTOLIC BLOOD PRESSURE: 128 MMHG | OXYGEN SATURATION: 93 % | BODY MASS INDEX: 33.32 KG/M2 | TEMPERATURE: 97.4 F | HEIGHT: 72 IN | HEART RATE: 68 BPM | WEIGHT: 246 LBS | DIASTOLIC BLOOD PRESSURE: 80 MMHG

## 2025-07-09 DIAGNOSIS — J06.9 UPPER RESPIRATORY TRACT INFECTION, UNSPECIFIED TYPE: Primary | ICD-10-CM

## 2025-07-09 PROCEDURE — 99214 OFFICE O/P EST MOD 30 MIN: CPT

## 2025-07-09 PROCEDURE — G2211 COMPLEX E/M VISIT ADD ON: HCPCS

## 2025-07-09 RX ORDER — DEXTROMETHORPHAN HYDROBROMIDE AND PROMETHAZINE HYDROCHLORIDE 15; 6.25 MG/5ML; MG/5ML
5 SYRUP ORAL 4 TIMES DAILY PRN
Qty: 118 ML | Refills: 0 | Status: SHIPPED | OUTPATIENT
Start: 2025-07-09

## 2025-07-09 NOTE — PROGRESS NOTES
Name: Artur Del Toro Jr.      : 1950      MRN: [id number]  Encounter Provider: Alla Fleming PA-C  Encounter Date: 2025   Encounter department: [facility] [address] [facility]  :  Assessment & Plan  Upper respiratory tract infection, unspecified type  -follow up from 2025. He was seen for viral URI and treated with flonase and tessalon perles.   -He notes symptoms are persisting and worsening.   -Denies fever, chills, SOB, wheezing.   -Flonase providing some relief and not much relief with tessalon perles   -Lungs CTA b/l, nasal congestion, erythematous throat with PND  -Will treat with Augmentin bid x 7 days and phenergan-dm cough syrup to use for coughing relief. Discussed side effects and to monitor   -RTO if no improvement in symptoms     Orders:    amoxicillin-clavulanate (AUGMENTIN) 875-125 mg per tablet; Take 1 tablet by mouth every 12 (twelve) hours for 7 days    promethazine-dextromethorphan (PHENERGAN-DM) 6.25-15 mg/5 mL oral syrup; Take 5 mL by mouth 4 (four) times a day as needed for cough           History of Present Illness     HPI    Agustin presents to the office for follow up from 2025. He was seen for viral URI and treated with flonase and tessalon perles. He notes symptoms are persisting without relief. He notes symptoms have been worsening.     Denies fever, chills, SOB, wheezing.     Flonase providing some relief and not much relief with tessalon perles     Review of Systems   Constitutional:  Negative for chills, fatigue and fever.   HENT:  Negative for congestion, ear pain, rhinorrhea, sinus pain and sore throat.    Eyes:  Negative for pain.   Respiratory:  Positive for cough. Negative for shortness of breath and wheezing.    Cardiovascular:  Negative for chest pain and leg swelling.   Gastrointestinal:  Negative for abdominal pain, constipation, diarrhea, nausea and vomiting.   Genitourinary:  Negative for difficulty urinating, dysuria, frequency  and urgency.   Musculoskeletal:  Negative for neck pain.   Skin:  Negative for rash.   Neurological:  Negative for dizziness and headaches.   Psychiatric/Behavioral:  Negative for sleep disturbance.        Objective   /80   Pulse 68   Temp (!) 97.4 °F (36.3 °C)   Ht 6' (1.829 m)   Wt 112 kg (246 lb)   SpO2 93%   BMI 33.36 kg/m²      Physical Exam  Vitals reviewed.   Constitutional:       General: He is not in acute distress.     Appearance: Normal appearance.   HENT:      Head: Normocephalic and atraumatic.      Right Ear: External ear normal. There is impacted cerumen.      Left Ear: External ear normal. There is impacted cerumen.      Nose: Congestion present.      Right Sinus: No maxillary sinus tenderness or frontal sinus tenderness.      Left Sinus: No maxillary sinus tenderness or frontal sinus tenderness.      Mouth/Throat:      Mouth: Mucous membranes are moist.      Pharynx: Posterior oropharyngeal erythema and postnasal drip present.     Eyes:      Extraocular Movements: Extraocular movements intact.      Conjunctiva/sclera: Conjunctivae normal.       Cardiovascular:      Rate and Rhythm: Normal rate and regular rhythm.      Heart sounds: Normal heart sounds.   Pulmonary:      Effort: Pulmonary effort is normal.      Breath sounds: Normal breath sounds. No wheezing, rhonchi or rales.   Abdominal:      General: Bowel sounds are normal. There is no distension.      Palpations: Abdomen is soft.      Tenderness: There is no abdominal tenderness. There is no right CVA tenderness or left CVA tenderness.     Musculoskeletal:      Cervical back: Neck supple.      Right lower leg: No edema.      Left lower leg: No edema.   Lymphadenopathy:      Cervical: No cervical adenopathy.     Skin:     General: Skin is warm.      Capillary Refill: Capillary refill takes less than 2 seconds.      Findings: No rash.     Neurological:      Mental Status: He is alert. Mental status is at baseline.           Alla  [facility]  7/9/2025 9:47 AM

## 2025-07-21 ENCOUNTER — OFFICE VISIT (OUTPATIENT)
Age: 75
End: 2025-07-21
Payer: MEDICARE

## 2025-07-21 VITALS
WEIGHT: 258.8 LBS | HEART RATE: 70 BPM | BODY MASS INDEX: 35.05 KG/M2 | OXYGEN SATURATION: 95 % | DIASTOLIC BLOOD PRESSURE: 60 MMHG | HEIGHT: 72 IN | TEMPERATURE: 98 F | SYSTOLIC BLOOD PRESSURE: 124 MMHG

## 2025-07-21 DIAGNOSIS — J20.9 ACUTE BRONCHITIS, UNSPECIFIED ORGANISM: ICD-10-CM

## 2025-07-21 DIAGNOSIS — J41.0 SIMPLE CHRONIC BRONCHITIS (HCC): Primary | ICD-10-CM

## 2025-07-21 DIAGNOSIS — F17.211 CIGARETTE NICOTINE DEPENDENCE IN REMISSION: ICD-10-CM

## 2025-07-21 PROCEDURE — 99214 OFFICE O/P EST MOD 30 MIN: CPT

## 2025-07-21 RX ORDER — AZITHROMYCIN 250 MG/1
TABLET, FILM COATED ORAL
Qty: 6 TABLET | Refills: 0 | Status: SHIPPED | OUTPATIENT
Start: 2025-07-21 | End: 2025-07-25

## 2025-07-21 RX ORDER — IPRATROPIUM BROMIDE AND ALBUTEROL SULFATE 2.5; .5 MG/3ML; MG/3ML
3 SOLUTION RESPIRATORY (INHALATION) EVERY 6 HOURS PRN
Qty: 180 ML | Refills: 3 | Status: SHIPPED | OUTPATIENT
Start: 2025-07-21

## 2025-07-21 RX ORDER — ALBUTEROL SULFATE 0.83 MG/ML
2.5 SOLUTION RESPIRATORY (INHALATION) EVERY 6 HOURS PRN
Qty: 180 ML | Refills: 3 | Status: SHIPPED | OUTPATIENT
Start: 2025-07-21 | End: 2025-07-21

## 2025-07-21 NOTE — ASSESSMENT & PLAN NOTE
- PFTs in 2020 with no obstructive airflow limitation.  Mild restrictive pattern and mildly reduced diffusion capacity  - Mild apical predominant centrilobular emphysema on CT imaging  - Not currently on any bronchodilators  -Currently with acute symptoms, see plan below

## 2025-07-21 NOTE — PROGRESS NOTES
Follow-up  Visit - Pulmonary Medicine   Name: Artur Del Toro Jr.      : 1950      MRN: [id number]  Encounter Provider: SUMAN Nash  Encounter Date: 2025   Encounter department: [facility] PULMONARY [facility]  :  Assessment & Plan  Simple chronic bronchitis (HCC)  - PFTs in 2020 with no obstructive airflow limitation.  Mild restrictive pattern and mildly reduced diffusion capacity  - Mild apical predominant centrilobular emphysema on CT imaging  - Not currently on any bronchodilators  -Currently with acute symptoms, see plan below    Cigarette nicotine dependence in remission  - Approximately 92-pack-year history quit 15 years ago  -Recent CT lung cancer screening reviewed.  Showed no concerning pulmonary nodules  -Given quit date 15 years ago, no longer meets criteria for CT lung cancer screening    Acute bronchitis, unspecified organism  - Currently with acute bronchitis type symptoms starting 3 weeks ago.  Harsh mucousy cough and occasional chest tightness.  No fevers, chills, or shortness of breath  - Treatments previously tried: Tessalon Perles, Flonase nasal spray, Augmentin, and Phenergan DM without relief  -Patient has no current inhalers/nebs, would likely benefit.  Start DuoNebs, recommend at least 2 treatments daily, up to 4 x daily  -Start Z-Jose, take as prescribed  -Short-term follow-up in 3 weeks or sooner if needed     Orders:    azithromycin (ZITHROMAX) 250 mg tablet; Take 2 tablets today then 1 tablet daily x 4 days    Nebulizer    ipratropium-albuterol (DUO-NEB) 0.5-2.5 mg/3 mL nebulizer solution; Take 3 mL by nebulization every 6 (six) hours as needed for wheezing or shortness of breath      Return in about 3 weeks (around 2025).    History of Present Illness   Artur Del Toro Jr. is a 75 y.o. male former smoker who is here today for routine follow-up visit for chronic bronchitis.  Last seen in the office 1 year ago with Dr. Cedeno.  He had his  yearly CT lung cancer screening earlier this month which showed no new or suspicious nodules.  He has not required any bronchodilators.      Patient returns today, he is sick.  Started with symptoms 3 weeks ago.  Has persistent, frequent mucousy cough, mostly nonproductive.  Occasional chest tightness.  No wheezing, shortness of breath, fevers, chills, or night sweats.  Recent travel to [place] at the end of June with + sick contacts. Treatments previously tried: Tessalon Perles, Flonase nasal spray, Augmentin, OTC NyQuil, and Phenergan DM without relief.  Has no inhalers or nebs at home.  His CT lung cancer screening was completed towards the beginning of symptoms which showed no acute infection.      Review of Systems   Constitutional:  Negative for appetite change and fever.   HENT:  Positive for postnasal drip. Negative for ear pain, rhinorrhea, sneezing, sore throat and trouble swallowing.    Respiratory:  Positive for cough.    Cardiovascular:  Negative for chest pain.   Musculoskeletal:  Negative for myalgias.   Neurological:  Negative for headaches.       Aside from what is mentioned in the HPI, ROS is otherwise negative         Medical History Reviewed by provider this encounter:  Tobacco  Allergies  Meds  Problems  Med Hx  Surg Hx  Fam Hx     .    Objective   /60   Pulse 70   Temp 98 °F (36.7 °C)   Ht 6' (1.829 m)   Wt 117 kg (258 lb 12.8 oz)   SpO2 95%   BMI 35.10 kg/m²     Physical Exam  Vitals and nursing note reviewed.   Constitutional:       General: He is not in acute distress.     Appearance: Normal appearance. He is well-developed.     Cardiovascular:      Rate and Rhythm: Normal rate and regular rhythm.      Heart sounds: Normal heart sounds. No murmur heard.  Pulmonary:      Effort: Pulmonary effort is normal. No respiratory distress.      Breath sounds: Normal breath sounds. No decreased breath sounds, wheezing, rhonchi or rales.      Comments: Harsh mucousy cough during  visit    Musculoskeletal:         General: No swelling.      Right lower leg: No edema.      Left lower leg: No edema.     Psychiatric:         Mood and Affect: Mood and affect normal.         Behavior: Behavior normal. Behavior is cooperative.           Diagnostic Data:  Labs: I personally reviewed the most recent laboratory data pertinent to today's visit.      Radiology results:  Radiology Results Review: I have reviewed radiology reports from 7/7/2025 including: CT chest.      PFT/spirometry results: Reviewed study from 7/6/2020      Oximetry testing:      Other studies:      SUMAN Nash

## 2025-07-21 NOTE — ASSESSMENT & PLAN NOTE
- Currently with acute bronchitis type symptoms starting 3 weeks ago.  Harsh mucousy cough and occasional chest tightness.  No fevers, chills, or shortness of breath  - Treatments previously tried: Tessalon Perles, Flonase nasal spray, Augmentin, and Phenergan DM without relief  -Patient has no current inhalers/nebs, would likely benefit.  Start DuoNebs, recommend at least 2 treatments daily, up to 4 x daily  -Start Z-Jose, take as prescribed  -Short-term follow-up in 3 weeks or sooner if needed     Orders:    azithromycin (ZITHROMAX) 250 mg tablet; Take 2 tablets today then 1 tablet daily x 4 days    Nebulizer    ipratropium-albuterol (DUO-NEB) 0.5-2.5 mg/3 mL nebulizer solution; Take 3 mL by nebulization every 6 (six) hours as needed for wheezing or shortness of breath

## 2025-07-22 ENCOUNTER — TELEPHONE (OUTPATIENT)
Age: 75
End: 2025-07-22

## 2025-07-22 ENCOUNTER — RA CDI HCC (OUTPATIENT)
Dept: OTHER | Facility: HOSPITAL | Age: 75
End: 2025-07-22

## 2025-07-22 LAB
DME PARACHUTE DELIVERY DATE REQUESTED: NORMAL
DME PARACHUTE ITEM DESCRIPTION: NORMAL
DME PARACHUTE ORDER STATUS: NORMAL
DME PARACHUTE SUPPLIER NAME: NORMAL
DME PARACHUTE SUPPLIER PHONE: NORMAL

## 2025-07-25 ENCOUNTER — OFFICE VISIT (OUTPATIENT)
Dept: FAMILY MEDICINE CLINIC | Facility: CLINIC | Age: 75
End: 2025-07-25
Payer: MEDICARE

## 2025-07-25 VITALS
OXYGEN SATURATION: 96 % | HEART RATE: 85 BPM | TEMPERATURE: 98.2 F | BODY MASS INDEX: 34.13 KG/M2 | DIASTOLIC BLOOD PRESSURE: 72 MMHG | SYSTOLIC BLOOD PRESSURE: 132 MMHG | HEIGHT: 72 IN | WEIGHT: 252 LBS

## 2025-07-25 DIAGNOSIS — E78.2 MIXED HYPERLIPIDEMIA: ICD-10-CM

## 2025-07-25 DIAGNOSIS — I10 ESSENTIAL HYPERTENSION: Primary | ICD-10-CM

## 2025-07-25 DIAGNOSIS — N18.31 STAGE 3A CHRONIC KIDNEY DISEASE (HCC): ICD-10-CM

## 2025-07-25 PROCEDURE — G2211 COMPLEX E/M VISIT ADD ON: HCPCS | Performed by: FAMILY MEDICINE

## 2025-07-25 PROCEDURE — 99214 OFFICE O/P EST MOD 30 MIN: CPT | Performed by: FAMILY MEDICINE

## 2025-07-25 RX ORDER — LOSARTAN POTASSIUM 50 MG/1
50 TABLET ORAL DAILY
Qty: 90 TABLET | Refills: 1 | Status: SHIPPED | OUTPATIENT
Start: 2025-07-25

## 2025-07-25 NOTE — ASSESSMENT & PLAN NOTE
We will discontinue the lisinopril due to the cough, will start losartan, recheck his blood pressure in the office in 6 months.  Orders:  •  losartan (COZAAR) 50 mg tablet; Take 1 tablet (50 mg total) by mouth daily

## 2025-07-25 NOTE — PROGRESS NOTES
Name: Artur Del Toro Jr.      : 1950      MRN: [id number]  Encounter Provider: Hardy Doshi DO  Encounter Date: 2025   Encounter department: [facility] [address]  :  Assessment & Plan  Essential hypertension  We will discontinue the lisinopril due to the cough, will start losartan, recheck his blood pressure in the office in 6 months.  Orders:  •  losartan (COZAAR) 50 mg tablet; Take 1 tablet (50 mg total) by mouth daily    Stage 3a chronic kidney disease (HCC)  Lab Results   Component Value Date    EGFR 59 2023    EGFR 66 10/26/2022    EGFR 67 2022    CREATININE 1.20 2023    CREATININE 1.10 10/26/2022    CREATININE 1.09 2022   Stable, will continue to monitor with [facility]         Mixed hyperlipidemia  Controlled, continue the atorvastatin, [facility].         Assessment & Plan           History of Present Illness   Patient comes in today for a checkup.  He does complain of a persistent dry cough that has had for approximately 5 weeks.  He did have his recent lung cancer screening chest CAT scan which was unremarkable.  Other than the cough he states he feels well.      Review of Systems   Constitutional:  Negative for chills, fatigue and fever.   HENT:  Negative for congestion, ear pain, hearing loss, postnasal drip, rhinorrhea and sore throat.    Eyes:  Negative for pain and visual disturbance.   Respiratory:  Positive for cough. Negative for chest tightness, shortness of breath and wheezing.    Cardiovascular:  Negative for chest pain and leg swelling.   Gastrointestinal:  Negative for abdominal distention, abdominal pain, constipation, diarrhea and vomiting.   Endocrine: Negative for cold intolerance and heat intolerance.   Genitourinary:  Negative for difficulty urinating, frequency and urgency.   Musculoskeletal:  Negative for arthralgias and gait problem.    Skin:  Negative for color change.   Neurological:  Negative for dizziness, tremors, syncope, numbness and headaches.   Hematological:  Negative for adenopathy.   Psychiatric/Behavioral:  Negative for agitation, confusion and sleep disturbance. The patient is not nervous/anxious.        Objective   /72 (BP Location: Left arm, Patient Position: Sitting, Cuff Size: Standard)   Pulse 85   Temp 98.2 °F (36.8 °C) (Temporal)   Ht 6' (1.829 m)   Wt 114 kg (252 lb)   SpO2 96%   BMI 34.18 kg/m²      Physical Exam  Constitutional:       Appearance: He is well-developed.   HENT:      Head: Normocephalic.      Right Ear: External ear normal.      Left Ear: External ear normal.      Nose: Nose normal.     Eyes:      Extraocular Movements: Extraocular movements intact.      Conjunctiva/sclera: Conjunctivae normal.      Pupils: Pupils are equal, round, and reactive to light.     Neck:      Thyroid: No thyromegaly.     Cardiovascular:      Rate and Rhythm: Normal rate and regular rhythm.      Heart sounds: Normal heart sounds.   Pulmonary:      Effort: Pulmonary effort is normal.      Breath sounds: Normal breath sounds.   Abdominal:      General: Bowel sounds are normal.      Palpations: Abdomen is soft.     Musculoskeletal:         General: Normal range of motion.      Cervical back: Normal range of motion.     Skin:     General: Skin is warm and dry.     Neurological:      Mental Status: He is alert and oriented to person, place, and time.     Psychiatric:         Mood and Affect: Mood normal.         Behavior: Behavior normal.

## 2025-07-25 NOTE — ASSESSMENT & PLAN NOTE
Lab Results   Component Value Date    EGFR 59 07/05/2023    EGFR 66 10/26/2022    EGFR 67 04/08/2022    CREATININE 1.20 07/05/2023    CREATININE 1.10 10/26/2022    CREATININE 1.09 04/08/2022   Stable, will continue to monitor with his [facility].

## (undated) DEVICE — 3M™ STERI-STRIP™ REINFORCED ADHESIVE SKIN CLOSURES, R1541, 1/4 IN X 3 IN (6 MM X 75 MM), 3 STRIPS/ENVELOPE: Brand: 3M™ STERI-STRIP™

## (undated) DEVICE — PENROSE DRAIN, 18 X 3 8: Brand: CARDINAL HEALTH

## (undated) DEVICE — SUT VICRYL 1 CT-1 27 IN J261H

## (undated) DEVICE — STRL PENROSE DRAIN 18" X 1/2": Brand: CARDINAL HEALTH

## (undated) DEVICE — GLOVE SRG BIOGEL 7

## (undated) DEVICE — BETHLEHEM UNIVERSAL MINOR GEN: Brand: CARDINAL HEALTH

## (undated) DEVICE — SPONGE CHERRY 1/2IN

## (undated) DEVICE — GAUZE SPONGES,USP TYPE VII GAUZE, 12 PLY: Brand: CURITY

## (undated) DEVICE — SCD SEQUENTIAL COMPRESSION COMFORT SLEEVE MEDIUM KNEE LENGTH: Brand: KENDALL SCD

## (undated) DEVICE — SUT VICRYL 2-0 18 IN J911T

## (undated) DEVICE — PAD GROUNDING ADULT

## (undated) DEVICE — SUT PROLENE 2-0 CT-2 30 IN 8411H

## (undated) DEVICE — POOLE SUCTION HANDLE: Brand: CARDINAL HEALTH

## (undated) DEVICE — 3M™ TEGADERM™ TRANSPARENT FILM DRESSING FRAME STYLE, 1626W, 4 IN X 4-3/4 IN (10 CM X 12 CM), 50/CT 4CT/CASE: Brand: 3M™ TEGADERM™

## (undated) DEVICE — TUBING SUCTION 5MM X 12 FT

## (undated) DEVICE — PLUMEPEN PRO 10FT

## (undated) DEVICE — SUT VICRYL 2-0 SH 27 IN UNDYED J417H

## (undated) DEVICE — DRAPE EQUIPMENT RF WAND

## (undated) DEVICE — SUT VICRYL 3-0 SH 27 IN J416H

## (undated) DEVICE — LIGHT HANDLE COVER SLEEVE DISP BLUE STELLAR

## (undated) DEVICE — SUT MONOCRYL 4-0 PS-2 18 IN Y496G

## (undated) DEVICE — NEEDLE 25G X 1 1/2

## (undated) DEVICE — MEDI-VAC YANKAUER SUCTION HANDLE W/STRAIGHT TIP & CONTROL VENT: Brand: CARDINAL HEALTH

## (undated) DEVICE — SYRINGE 10ML LL

## (undated) DEVICE — 4-PORT MANIFOLD: Brand: NEPTUNE 2

## (undated) DEVICE — GAUZE SPONGES,16 PLY: Brand: CURITY